# Patient Record
Sex: FEMALE | Race: WHITE | NOT HISPANIC OR LATINO | Employment: OTHER
[De-identification: names, ages, dates, MRNs, and addresses within clinical notes are randomized per-mention and may not be internally consistent; named-entity substitution may affect disease eponyms.]

---

## 2017-09-24 ENCOUNTER — HEALTH MAINTENANCE LETTER (OUTPATIENT)
Age: 62
End: 2017-09-24

## 2017-12-26 ENCOUNTER — OFFICE VISIT (OUTPATIENT)
Dept: FAMILY MEDICINE | Facility: CLINIC | Age: 62
End: 2017-12-26
Payer: COMMERCIAL

## 2017-12-26 VITALS
TEMPERATURE: 97.6 F | WEIGHT: 185.2 LBS | DIASTOLIC BLOOD PRESSURE: 84 MMHG | OXYGEN SATURATION: 97 % | HEIGHT: 68 IN | HEART RATE: 72 BPM | SYSTOLIC BLOOD PRESSURE: 142 MMHG | BODY MASS INDEX: 28.07 KG/M2

## 2017-12-26 DIAGNOSIS — H65.01 RIGHT ACUTE SEROUS OTITIS MEDIA, RECURRENCE NOT SPECIFIED: Primary | ICD-10-CM

## 2017-12-26 DIAGNOSIS — H60.92 OTITIS EXTERNA OF LEFT EAR, UNSPECIFIED CHRONICITY, UNSPECIFIED TYPE: ICD-10-CM

## 2017-12-26 PROCEDURE — 99213 OFFICE O/P EST LOW 20 MIN: CPT | Performed by: PHYSICIAN ASSISTANT

## 2017-12-26 RX ORDER — CIPROFLOXACIN 500 MG/1
500 TABLET, FILM COATED ORAL 2 TIMES DAILY
COMMUNITY
End: 2019-07-30

## 2017-12-26 RX ORDER — FLUTICASONE PROPIONATE 50 MCG
2 SPRAY, SUSPENSION (ML) NASAL DAILY
COMMUNITY
End: 2019-07-30

## 2017-12-26 RX ORDER — CIPROFLOXACIN AND DEXAMETHASONE 3; 1 MG/ML; MG/ML
3 SUSPENSION/ DROPS AURICULAR (OTIC) 2 TIMES DAILY
COMMUNITY
End: 2019-07-30

## 2017-12-26 RX ORDER — CEFDINIR 300 MG/1
300 CAPSULE ORAL 2 TIMES DAILY
Qty: 20 CAPSULE | Refills: 0 | Status: SHIPPED | OUTPATIENT
Start: 2017-12-26 | End: 2018-01-05

## 2017-12-26 ASSESSMENT — ENCOUNTER SYMPTOMS
NEUROLOGICAL NEGATIVE: 1
WEIGHT LOSS: 0
COUGH: 1
HEADACHES: 0
EYE PAIN: 0
CARDIOVASCULAR NEGATIVE: 1
DIAPHORESIS: 0
HEMOPTYSIS: 0
DIZZINESS: 0
PALPITATIONS: 0
FEVER: 0
CONSTITUTIONAL NEGATIVE: 1
GASTROINTESTINAL NEGATIVE: 1

## 2017-12-26 NOTE — NURSING NOTE
"Chief Complaint   Patient presents with     Ear Problem     Right Ear       Initial /84 (BP Location: Right arm, Patient Position: Sitting, Cuff Size: Adult Large)  Pulse 72  Temp 97.6  F (36.4  C) (Oral)  Ht 5' 7.5\" (1.715 m)  Wt 185 lb 3.2 oz (84 kg)  SpO2 97%  BMI 28.58 kg/m2 Estimated body mass index is 28.58 kg/(m^2) as calculated from the following:    Height as of this encounter: 5' 7.5\" (1.715 m).    Weight as of this encounter: 185 lb 3.2 oz (84 kg).  Medication Reconciliation: complete    "

## 2017-12-26 NOTE — PROGRESS NOTES
SUBJECTIVE:     HPI  Neema Nixon is a 62 year old female who presents to clinic today for the following health issues:  ENT Symptoms           Symptoms: cc Present Absent Comment   Fever/Chills   x    Fatigue  x     Muscle Aches   x    Eye Irritation   x    Sneezing  x     Nasal Mc/Drg  x     Sinus Pressure/Pain  x  A little   Loss of smell  x     Dental pain   x    Sore Throat   x    Swollen Glands   x    Ear Pain/Fullness x   R ear pain with plugged sensation.  No drainage.  Recent swimming.  Was on an airplane recently.   Cough  x  Mild, nonproductive   Wheeze   x    Chest Pain   x    Shortness of breath   x    Rash   x    Other         Symptom duration:  Yesterday--Right Ear.  Is currently on ciprofloxacin tablets and ear drops along with medrol dose pack for a L sided ear infection which had initially failed  amoxicillin.     Symptom severity:  moderate to severe   Treatments tried:  ear drops, flonase, medrol will   Contacts:         Reviewed PMH    Current Outpatient Prescriptions   Medication Sig Dispense Refill     ciprofloxacin (CIPRO) 500 MG tablet Take 500 mg by mouth 2 times daily       fluticasone (FLONASE) 50 MCG/ACT spray Spray 2 sprays into both nostrils daily       ciprofloxacin-dexamethasone (CIPRODEX) otic suspension Place 3 drops Into the left ear 2 times daily       Allergies   Allergen Reactions     Sulfa Drugs        Review of Systems   Constitutional: Negative.  Negative for diaphoresis, fever and weight loss.   HENT: Positive for ear pain and hearing loss. Negative for tinnitus.    Eyes: Negative for pain.   Respiratory: Positive for cough. Negative for hemoptysis.    Cardiovascular: Negative.  Negative for chest pain and palpitations.   Gastrointestinal: Negative.    Skin: Negative.    Neurological: Negative.  Negative for dizziness and headaches.   Endo/Heme/Allergies: Negative for environmental allergies.   All other systems reviewed and are negative.      /84 (BP  "Location: Right arm, Patient Position: Sitting, Cuff Size: Adult Large)  Pulse 72  Temp 97.6  F (36.4  C) (Oral)  Ht 5' 7.5\" (1.715 m)  Wt 185 lb 3.2 oz (84 kg)  SpO2 97%  BMI 28.58 kg/m2  Physical Exam   Constitutional: She is oriented to person, place, and time and well-developed, well-nourished, and in no distress. No distress.   HENT:   Head: Normocephalic and atraumatic.   Right Ear: Hearing, external ear and ear canal normal. Tympanic membrane is erythematous and bulging. Tympanic membrane is not perforated and not retracted.   Left Ear: Hearing, tympanic membrane, external ear and ear canal normal.   Nose: Nose normal.   Mouth/Throat: Uvula is midline and oropharynx is clear and moist. No oropharyngeal exudate or posterior oropharyngeal erythema.   Eyes: Conjunctivae and EOM are normal. Pupils are equal, round, and reactive to light. No scleral icterus.   Neck: Normal range of motion. Neck supple. No thyromegaly present.   Cardiovascular: Normal rate, regular rhythm, normal heart sounds and intact distal pulses.  Exam reveals no gallop and no friction rub.    No murmur heard.  Pulmonary/Chest: Effort normal and breath sounds normal. No accessory muscle usage. No respiratory distress. She has no decreased breath sounds. She has no wheezes. She has no rhonchi. She has no rales.   Lymphadenopathy:     She has no cervical adenopathy.   Neurological: She is alert and oriented to person, place, and time.   Skin: Skin is warm and dry. No cyanosis. Nails show no clubbing.   Psychiatric: Mood and affect normal.   Nursing note and vitals reviewed.        Assessment/Plan:  Right acute serous otitis media, recurrence not specified:  Has failed amoxicillin and cipro.  Will treat with [id number] for OM.  Discussed risks and benefits of medication along with side effects, direction for use, and discoloration of the urine and stools.  Recommend tylenol/ibuprofen prn pain/fever and zyrtec/claritin for sinus " congestion.   Rest, fluids, chicken soup.  Will send to ENT if no improvement.  Recheck in clinic if symptoms worsen or if symptoms do not improve.    -     cefdinir (OMNICEF) 300 MG capsule; Take 1 capsule (300 mg) by mouth 2 times daily for 10 days  -     OTOLARYNGOLOGY REFERRAL    Otitis externa of left ear, unspecified chronicity, unspecified type:  This has improved on cipro tablets, ear drops and medrol dose pack.  Complete the medications as directed.  Keep clean and dry.  Avoid foreign body insertion.  -     OTOLARYNGOLOGY REFERRAL      Tamara Pavon PA-C

## 2017-12-26 NOTE — MR AVS SNAPSHOT
After Visit Summary   12/26/2017    Neema Nixon    MRN: [id number]           Patient Information     Date Of Birth          1955        Visit Information        Provider Department      12/26/2017 8:00 AM Tamara Pavon PA-C [facility]        Today's Diagnoses     Right acute serous otitis media, recurrence not specified    -  1    Otitis externa of left ear, unspecified chronicity, unspecified type           Follow-ups after your visit        Additional Services     OTOLARYNGOLOGY REFERRAL       Your provider has referred you to: FMG: [facility] [place] [phone]   [email or web address]  FMG: [place] [facility] [place] [phone]   [email or web address]  UM: [facility] [place] [phone]   [email or web address]    Please be aware that coverage of these services is subject to the terms and limitations of your health insurance plan.  Call member services at your health plan with any benefit or coverage questions.      Please bring the following with you to your appointment:    (1) Any X-Rays, CTs or MRIs which have been performed.  Contact the facility where they were done to arrange for  prior to your scheduled appointment.   (2) List of current medications  (3) This referral request   (4) Any documents/labs given to you for this referral                  Who to contact     If you have questions or need follow up information about today's clinic visit or your schedule please contact [facility] directly at [phone].  Normal or non-critical lab and imaging results will be communicated to you by MyChart, letter or phone within 4 business days after the clinic has received the results. If you do not hear from us within 7 days, please contact the clinic through MyChart or phone. If you have  "a critical or abnormal lab result, we will notify you by phone as soon as possible.  Submit refill requests through "[employer] or call your pharmacy and they will forward the refill request to us. Please allow 3 business days for your refill to be completed.          Additional Information About Your Visit        Coin[employer] Information     "[employer] lets you send messages to your doctor, view your test results, renew your prescriptions, schedule appointments and more. To sign up, go to [email or web address] . Click on \"Log in\" on the left side of the screen, which will take you to the Welcome page. Then click on \"Sign up Now\" on the right side of the page.     You will be asked to enter the access code listed below, as well as some personal information. Please follow the directions to create your username and password.     Your access code is: [email or web address]  Expires: 3/26/2018  8:26 AM     Your access code will  in 90 days. If you need help or a new code, please call your [place] clinic or [phone].        Care EveryWhere ID     This is your Care EveryWhere ID. This could be used by other organizations to access your [place] medical records  [id number]        Your Vitals Were     Pulse Temperature Height Pulse Oximetry BMI (Body Mass Index)       72 97.6  F (36.4  C) (Oral) 5' 7.5\" (1.715 m) 97% 28.58 kg/m2        Blood Pressure from Last 3 Encounters:   17 142/84    Weight from Last 3 Encounters:   17 185 lb 3.2 oz (84 kg)              We Performed the Following     OTOLARYNGOLOGY REFERRAL          Today's Medication Changes          These changes are accurate as of: 17  8:26 AM.  If you have any questions, ask your nurse or doctor.               Start taking these medicines.        Dose/Directions    cefdinir 300 MG capsule   Commonly known as:  OMNICEF   Used for:  Right acute serous otitis media, recurrence not specified   Started by:  Tamara Pavon PA-C        Dose:  300 mg   Take 1 " capsule (300 mg) by mouth 2 times daily for 10 days   Quantity:  20 capsule   Refills:  0            Where to get your medicines      These medications were sent to [facility] [address] [place] [facility] [place] - [address]  [address], [place] [address]     Phone:  [phone]     cefdinir 300 MG capsule                Primary Care Provider Office Phone # Fax #    Maki Christiane Daigle [phone] [address] [address] [facility] [address]        Equal Access to Services     [facility]: Hadii aad ku hadasho Soomaali, waaxda luqadaha, qaybta kaalmada adeegyada, waxay idiin hayaan adeeg kharachente keyes . So [facility] [phone].    ATENCIÓN: Si habla español, tiene a vance disposición servicios gratuitos de asistencia lingüística. [facility] [phone].    We comply with applicable federal civil rights laws and [place] laws. We do not discriminate on the basis of race, color, national origin, age, disability, sex, sexual orientation, or gender identity.            Thank you!     Thank you for choosing [facility]  for your care. Our goal is always to provide you with excellent care. Hearing back from our patients is one way we can continue to improve our services. Please take a few minutes to complete the written survey that you may receive in the mail after your visit with us. Thank you!             Your Updated Medication List - Protect others around you: Learn how to safely use, store and throw away your medicines at www.disposemymeds.org.          This list is accurate as of: 12/26/17  8:26 AM.  Always use your most recent med list.                   Brand Name Dispense Instructions for use Diagnosis    cefdinir 300 MG capsule    OMNICEF    20 capsule    Take 1 capsule (300 mg) by mouth 2 times daily for 10 days    Right acute serous otitis media, recurrence not specified       CIPRO 500 MG tablet   Generic drug:  ciprofloxacin      Take 500 mg by mouth 2 times daily         ciprofloxacin-dexamethasone otic suspension    CIPRODEX     Place 3 drops Into the left ear 2 times daily        fluticasone 50 MCG/ACT spray    FLONASE     Spray 2 sprays into both nostrils daily

## 2019-07-01 ENCOUNTER — MEDICAL CORRESPONDENCE (OUTPATIENT)
Dept: HEALTH INFORMATION MANAGEMENT | Facility: CLINIC | Age: 64
End: 2019-07-01

## 2019-07-30 ENCOUNTER — OFFICE VISIT (OUTPATIENT)
Dept: OPHTHALMOLOGY | Facility: CLINIC | Age: 64
End: 2019-07-30
Attending: OPHTHALMOLOGY
Payer: COMMERCIAL

## 2019-07-30 DIAGNOSIS — H53.10 SUBJECTIVE VISUAL DISTURBANCE: ICD-10-CM

## 2019-07-30 DIAGNOSIS — H50.32 INTERMITTENT ESOTROPIA, ALTERNATING: Primary | ICD-10-CM

## 2019-07-30 DIAGNOSIS — H53.10 SUBJECTIVE VISUAL DISTURBANCE: Primary | ICD-10-CM

## 2019-07-30 PROCEDURE — G0463 HOSPITAL OUTPT CLINIC VISIT: HCPCS | Mod: ZF

## 2019-07-30 PROCEDURE — 92060 SENSORIMOTOR EXAMINATION: CPT | Mod: ZF | Performed by: OPHTHALMOLOGY

## 2019-07-30 RX ORDER — ERGOCALCIFEROL (VITAMIN D2) 10 MCG
1 TABLET ORAL EVERY MORNING
COMMUNITY
Start: 2017-02-15

## 2019-07-30 ASSESSMENT — VISUAL ACUITY
OS_CC: 20/25
METHOD: SNELLEN - LINEAR
CORRECTION_TYPE: GLASSES
OD_CC: 20/25

## 2019-07-30 ASSESSMENT — REFRACTION_WEARINGRX
OD_CYLINDER: SPHERE
OS_SPHERE: -12.25
OS_AXIS: 038
OS_CYLINDER: +1.25
OD_SPHERE: -11.00

## 2019-07-30 ASSESSMENT — SLIT LAMP EXAM - LIDS
COMMENTS: MGD
COMMENTS: MGD

## 2019-07-30 ASSESSMENT — EXTERNAL EXAM - RIGHT EYE: OD_EXAM: PTOSIS

## 2019-07-30 ASSESSMENT — TONOMETRY
OD_IOP_MMHG: 11
OS_IOP_MMHG: 12
IOP_METHOD: ICARE

## 2019-07-30 ASSESSMENT — CONF VISUAL FIELD
OD_NORMAL: 1
OS_NORMAL: 1

## 2019-07-30 ASSESSMENT — CUP TO DISC RATIO
OD_RATIO: 0.35
OS_RATIO: 0.45

## 2019-07-30 ASSESSMENT — EXTERNAL EXAM - LEFT EYE: OS_EXAM: PTOSIS

## 2019-07-30 NOTE — PROGRESS NOTES
Assessment & Plan     Neema Nixon is a 64 year old female with the following diagnoses:   1. Intermittent esotropia, alternating    2. Subjective visual disturbance         Patient was sent for consultation at the request of Dr. Neumann for binocular diplopia. Patient notes that for the last two years she has noticed binocular diplopia. It is intermittent but there most of the time. It is worse at distance, better at near. There is a vertical and horizontal component. She feels that if she puts pressure on the lateral aspect of the left globe, she can correct the double. No headaches. No weakness, numbness, tingling. She otherwise feels well. No jaw claudication, scalp tenderness, fatigue, weight loss, or proximal muscle weakness. She has not tried a fresnel.     On examination today, visual acuity is 20/25 in the right eye and 20/25 in the left eye. Pupillary exam is normal without evidence of afferent pupillary defect. Intraocular pressure is normal in both eyes. Confrontational visual fields are normal in both eyes. Ishihara color plates are full in both eyes. Anterior segment exam is unremarkable. Optic nerves appear healthy. Dilated fundoscopic exam is remarkable for mild ERM right eye, otherwise unremarkable. She has Posterior vitreous detachment (PVD) both eyes.  Extraocular motility showed a subtle AB duction deficit in both eyes.  She has an esotropia that is fairly competent in the distance and better at near.  She appears to appreciate a 15 base out prism.    It is my impression that the patient has intermittent esotropia.  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 It is also possible this is due to sagging eye syndrome or posterior fossa lesion.  It could also be a breakdown of her pre-existing misalignment.  I will obtain MRI brain.  I will give her a 15 STELLA prism left eye in the meantime.  If MRI normal can consider surgical evaluation with  strabismus surgeon.  Could consider lid evaluation with oculoplastics if ptosis continues to be bothersome after strabismus surgery.  I do not believe this is consistent with myasthenia gravis.         Attending Physician Attestation:  Complete documentation of historical and exam elements from today's encounter can be found in the full encounter summary report (not reduplicated in this progress note).  I personally obtained the chief complaint(s) and history of present illness.  I confirmed and edited as necessary the review of systems, past medical/surgical history, family history, social history, and examination findings as documented by others; and I examined the patient myself.  I personally reviewed the relevant tests, images, and reports as documented above.  I formulated and edited as necessary the assessment and plan and discussed the findings and management plan with the patient and family. - Rodri Cruz MD  Ophthalmology Resident, PGY-3

## 2019-07-30 NOTE — LETTER
2019         RE:  :  MRN: Neema Nixon  1955  [id number]     Dear Dr. Neumann,    Thank you for asking me to see your very pleasant patient, Neema Nixon, in neuro-ophthalmic consultation.  I would like to thank you for sending your records and I have summarized them in the history of present illness.  My assessment and plan are below.  For further details, please see my attached clinic note.       Assessment & Plan     Neema Nixon is a 64 year old female with the following diagnoses:   1. Intermittent esotropia, alternating    2. Subjective visual disturbance         Patient was sent for consultation at the request of Dr. Neumann for binocular diplopia. Patient notes that for the last two years she has noticed binocular diplopia. It is intermittent but there most of the time. It is worse at distance, better at near. There is a vertical and horizontal component. She feels that if she puts pressure on the lateral aspect of the left globe, she can correct the double. No headaches. No weakness, numbness, tingling. She otherwise feels well. No jaw claudication, scalp tenderness, fatigue, weight loss, or proximal muscle weakness. She has not tried a fresnel.     On examination today, visual acuity is 20/25 in the right eye and 20/25 in the left eye. Pupillary exam is normal without evidence of afferent pupillary defect. Intraocular pressure is normal in both eyes. Confrontational visual fields are normal in both eyes. Ishihara color plates are full in both eyes. Anterior segment exam is unremarkable. Optic nerves appear healthy. Dilated fundoscopic exam is remarkable for mild ERM right eye, otherwise unremarkable. She has Posterior vitreous detachment (PVD) both eyes.  Extraocular motility showed a subtle AB duction deficit in both eyes.  She has an esotropia that is fairly competent in the distance and better at near.  She appears to appreciate a 15 base out prism.    It is my impression that the  patient has intermittent esotropia.  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 It is also possible this is due to sagging eye syndrome or posterior fossa lesion.  It could also be a breakdown of her pre-existing misalignment.  I will obtain MRI brain.  I will give her a 15 STELLA prism left eye in the meantime.  If MRI normal can consider surgical evaluation with strabismus surgeon.  Could consider lid evaluation with oculoplastics if ptosis continues to be bothersome after strabismus surgery.  I do not believe this is consistent with myasthenia gravis.  Again, thank you for allowing me to participate in the care of your patient.      Sincerely,    Rodri Souza MD  Professor  Ophthalmology Residency   Director of Neuro-Ophthalmology  [facility]  [facility] Chair  Departments of Ophthalmology, Neurology, and Neurosurgery  [facility]   [address] [place] [address]  [address]  T - [phone]  F - [phone]  [email or web address]      CC: Deanna Neumann MD  [facility]  [address] [facility] [address]  VIA Facsimile: [phone]     Maki Daigle MD  [address] [facility] [address]  VIA In Basket       DX = heavy eye syndrome?     Addendum: The [facility] is now enrolling patients in the Surgical IIHTT which randomizes patients with Idiopathic Intracranial Hypertension and moderate to severe vision loss to one of the following regimens:    1.  Acetazolamide + diet   2.  Ventriculoperitoneal shunt     3.  Optic nerve sheath fenestration     We would appreciate referral of any newly diagnosed case of bilateral papilledema, ideally before any laboratory evaluation.  Please contact our  at [email or web address] or [phone].  Thank you!

## 2019-07-30 NOTE — NURSING NOTE
Chief Complaints and History of Present Illnesses   Patient presents with     Diplopia Evaluation     Chief Complaint(s) and History of Present Illness(es)     Diplopia Evaluation               Comments     Neema Nixon is a 64 year old female who presents today for  Diplopia    Gradual onset two years ago. Intermittent. Worse at distance. Patient has a history of high  Myopia with astigmatism.     Cristiano RODAS 7:47 AM July 30, 2019

## 2019-08-06 ENCOUNTER — ANCILLARY PROCEDURE (OUTPATIENT)
Dept: MRI IMAGING | Facility: CLINIC | Age: 64
End: 2019-08-06
Attending: OPHTHALMOLOGY
Payer: COMMERCIAL

## 2019-08-06 DIAGNOSIS — H50.32 INTERMITTENT ESOTROPIA, ALTERNATING: ICD-10-CM

## 2019-08-06 DIAGNOSIS — H53.10 SUBJECTIVE VISUAL DISTURBANCE: ICD-10-CM

## 2019-08-06 PROCEDURE — 70553 MRI BRAIN STEM W/O & W/DYE: CPT | Mod: TC

## 2019-08-06 PROCEDURE — A9585 GADOBUTROL INJECTION: HCPCS | Mod: JW

## 2019-08-06 RX ORDER — GADOBUTROL 604.72 MG/ML
10 INJECTION INTRAVENOUS ONCE
Status: COMPLETED | OUTPATIENT
Start: 2019-08-06 | End: 2019-08-06

## 2019-08-06 RX ADMIN — GADOBUTROL 8.5 ML: 604.72 INJECTION INTRAVENOUS at 11:33

## 2019-08-07 ENCOUNTER — TELEPHONE (OUTPATIENT)
Dept: OPHTHALMOLOGY | Facility: CLINIC | Age: 64
End: 2019-08-07

## 2019-08-07 DIAGNOSIS — D32.9 MENINGIOMA (H): Primary | ICD-10-CM

## 2019-08-07 NOTE — TELEPHONE ENCOUNTER
Pt calling clinic back after receiving message about MRI results    Note to Dr. Cheatham/facilitator for assist with call back for results  Keegan Goldberg RN 10:45 AM 08/07/19

## 2019-08-07 NOTE — TELEPHONE ENCOUNTER
Patient had recent MRI to rule out posterior fossa etiology of diplopia. 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 I discussed with the patient that this is not the etiology of her diplopia. She is interested in pursuing evaluation with Dr. Hernández, but would like to see neurosurgery first prior to seeing Dr. Hernández.     Rodri Cheatham MD  Ophthalmology, PGY-5  Neuro-Ophthalmology Fellow

## 2019-08-08 ENCOUNTER — TELEPHONE (OUTPATIENT)
Dept: OPHTHALMOLOGY | Facility: CLINIC | Age: 64
End: 2019-08-08

## 2019-08-08 NOTE — TELEPHONE ENCOUNTER
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 Had a recent MRI with incidental finding of right anterior clinioid process meningioma. Patient is going to see neurosurgery and then would like to see Dr. Hernández. Could you please have patient schedule with Dr. Hernández in 3-4 months? Thanks.    YE

## 2019-08-08 NOTE — TELEPHONE ENCOUNTER
"Scheduled patient for follow up, patient is \"snow bird\" and will be in [place] through october then she will be leaving and not returning until around Xmas. Scheduled for January for f/u  Patient requested reminder letter be mailed to [place] address (saved as temporary)  Patient inquired about neuro surgery f/u - sent message to pool to let them know about temp address/referral   "

## 2019-08-21 ENCOUNTER — DOCUMENTATION ONLY (OUTPATIENT)
Dept: CARE COORDINATION | Facility: CLINIC | Age: 64
End: 2019-08-21

## 2019-09-06 ENCOUNTER — OFFICE VISIT (OUTPATIENT)
Dept: NEUROSURGERY | Facility: CLINIC | Age: 64
End: 2019-09-06
Attending: NEUROLOGICAL SURGERY
Payer: COMMERCIAL

## 2019-09-06 VITALS
BODY MASS INDEX: 29.26 KG/M2 | SYSTOLIC BLOOD PRESSURE: 138 MMHG | RESPIRATION RATE: 16 BRPM | DIASTOLIC BLOOD PRESSURE: 80 MMHG | WEIGHT: 189.6 LBS | OXYGEN SATURATION: 98 % | HEART RATE: 71 BPM

## 2019-09-06 DIAGNOSIS — D32.9 MENINGIOMA (H): ICD-10-CM

## 2019-09-06 PROCEDURE — G0463 HOSPITAL OUTPT CLINIC VISIT: HCPCS

## 2019-09-06 ASSESSMENT — PAIN SCALES - GENERAL: PAINLEVEL: NO PAIN (0)

## 2019-09-06 NOTE — NURSING NOTE
"Oncology Rooming Note    September 6, 2019 2:34 PM   Neema Nixon is a 64 year old female who presents for:    Chief Complaint   Patient presents with     Oncology Clinic Visit     Pt is here for a New Eval for Meninginoma     Initial Vitals: Blood Pressure 138/80   Pulse 71   Respiration 16   Weight 86 kg (189 lb 9.6 oz)   Oxygen Saturation 98%   Body Mass Index 29.26 kg/m   Estimated body mass index is 29.26 kg/m  as calculated from the following:    Height as of 12/26/17: 1.715 m (5' 7.5\").    Weight as of this encounter: 86 kg (189 lb 9.6 oz). Body surface area is 2.02 meters squared.  No Pain (0) Comment: Data Unavailable   No LMP recorded. Patient is postmenopausal.  Allergies reviewed: Yes  Medications reviewed: Yes    Medications: Medication refills not needed today.  Pharmacy name entered into [facility]: [id number] [place] [address]    Clinical concerns: none       Stefanie Colmenares MA              "

## 2019-09-06 NOTE — LETTER
9/6/2019       RE: Neema Nixon  [address] [address] [facility] [address] [address]  [place] [address]     Dear Colleague,    Thank you for referring your patient, Neema Nixon, to the [facility]. Please see a copy of my visit note below.    Neurosurgery Clinic Note    Initial evaluation for an incidentally discovered right paraclinoid meningioma    64 F who underwent MRI of the brain as work-up for changes in vision. MRI revealed an1.1 x 1.3 x 1.4 cm right paraclinoid meningioma, without contact with the optic apparatus. There is no significant FLAIR surrounding the paraclinoid lesion. The patient presents for evaluation.    On review of systems, the patient report improvement in her visual symptoms after corrective lens use. Denied nausea, vomiting, headache, seizure like activities, altered motor/sensory function.    No PMH.    Current Outpatient Medications:      Vitamin D, Cholecalciferol, 400 units TABS, , Disp: , Rfl:     Allergies   Allergen Reactions     Sulfa Drugs      /80   Pulse 71   Resp 16   Wt 86 kg (189 lb 9.6 oz)   SpO2 98%   BMI 29.26 kg/m       Examination non-focal.    AP: 64 F with an incidentally found right paraclinoid meningioma, without attributable symptoms. In this context, I will schedule a surveillance MRI in 3 months. The patient will be in [place] in that time frame will be imaged locally. She will send in her MRI for assessment.    I have spent 65 minutes today, with the majority of the visit counseling the patient on the diagnosis. All questions were answered. Over 50% of my time on the unit was spent counseling the patient in terms expectations in terms of surveillance imaging.    Again, thank you for allowing me to participate in the care of your patient.      Sincerely,    Blake Raza MD

## 2019-09-12 NOTE — PROGRESS NOTES
Neurosurgery Clinic Note    Initial evaluation for an incidentally discovered right paraclinoid meningioma    64 F who underwent MRI of the brain as work-up for changes in vision. MRI revealed an1.1 x 1.3 x 1.4 cm right paraclinoid meningioma, without contact with the optic apparatus. There is no significant FLAIR surrounding the paraclinoid lesion. The patient presents for evaluation.    On review of systems, the patient report improvement in her visual symptoms after corrective lens use. Denied nausea, vomiting, headache, seizure like activities, altered motor/sensory function.    No PMH.    Current Outpatient Medications:      Vitamin D, Cholecalciferol, 400 units TABS, , Disp: , Rfl:     Allergies   Allergen Reactions     Sulfa Drugs      /80   Pulse 71   Resp 16   Wt 86 kg (189 lb 9.6 oz)   SpO2 98%   BMI 29.26 kg/m      Examination non-focal.    AP: 64 F with an incidentally found right paraclinoid meningioma, without attributable symptoms. In this context, I will schedule a surveillance MRI in 3 months. The patient will be in [place] in that time frame will be imaged locally. She will send in her MRI for assessment.    I have spent 65 minutes today, with the majority of the visit counseling the patient on the diagnosis. All questions were answered. Over 50% of my time on the unit was spent counseling the patient in terms expectations in terms of surveillance imaging.

## 2019-10-10 ENCOUNTER — OFFICE VISIT (OUTPATIENT)
Dept: OPHTHALMOLOGY | Facility: CLINIC | Age: 64
End: 2019-10-10
Attending: OPHTHALMOLOGY
Payer: COMMERCIAL

## 2019-10-10 DIAGNOSIS — H50.05 ESOTROPIA, ALTERNATING: ICD-10-CM

## 2019-10-10 DIAGNOSIS — H53.10 SUBJECTIVE VISUAL DISTURBANCE: ICD-10-CM

## 2019-10-10 DIAGNOSIS — H53.2 DOUBLE VISION: Primary | ICD-10-CM

## 2019-10-10 PROCEDURE — 92060 SENSORIMOTOR EXAMINATION: CPT | Mod: ZF | Performed by: OPHTHALMOLOGY

## 2019-10-10 PROCEDURE — G0463 HOSPITAL OUTPT CLINIC VISIT: HCPCS | Mod: 25,ZF

## 2019-10-10 ASSESSMENT — REFRACTION_WEARINGRX
OD_SPHERE: -11.00
OS_AXIS: 038
OS_SPHERE: -12.25
OD_CYLINDER: SPHERE
OS_CYLINDER: +1.25

## 2019-10-10 ASSESSMENT — VISUAL ACUITY
OD_CC: 20/25
CORRECTION_TYPE: GLASSES
METHOD: SNELLEN - LINEAR
OS_CC: 20/30
OS_CC+: -2

## 2019-10-10 ASSESSMENT — SLIT LAMP EXAM - LIDS
COMMENTS: MGD
COMMENTS: MGD

## 2019-10-10 ASSESSMENT — EXTERNAL EXAM - LEFT EYE: OS_EXAM: PTOSIS

## 2019-10-10 ASSESSMENT — CONF VISUAL FIELD
OS_NORMAL: 1
OD_NORMAL: 1

## 2019-10-10 ASSESSMENT — TONOMETRY
IOP_METHOD: ICARE
OS_IOP_MMHG: 7
OD_IOP_MMHG: 8

## 2019-10-10 ASSESSMENT — EXTERNAL EXAM - RIGHT EYE: OD_EXAM: PTOSIS

## 2019-10-10 NOTE — NURSING NOTE
Chief Complaints and History of Present Illnesses   Patient presents with     Diplopia Follow-Up     Chief Complaint(s) and History of Present Illness(es)     Diplopia Follow-Up               Comments     Neema Nixon is a 64 year old female who presents today for    1. INTERMITTENT ALTERNATING ESOTROPIA  MRI showed incidental meningioma superior to the right anterior clinoid process.     Fit with fresnel since the last visit. Interferes with vision.     Cristiano RODAS 2:30 PM October 10, 2019

## 2019-10-10 NOTE — PROGRESS NOTES
1. 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  Patient highly interested in strabismus surgery.  Discussed the risks, benefits, and alternatives of strabismus surgery and patient wishes to proceed.    Patient has had on and off again diplopia since June.    Prior to that she would occasionally have diplopia.     Status post MRI showing dural based mass.  Plan to follow with serial neuro-imaging.  She consulted with Dr. Raza in neurosurgery who wishes to observe.    Family history: First degree cousin- highly myopic with strabismus and requried strabismus surgery.    MRI 8/6/19- MRI brain with and without IV contrast (radiology read)  Ovoid uniformly enhancing 1.4 cm extra-axial dural-based  mass positioned along the superior aspect of the right anterior  clinoid process, most likely representing a meningioma.    Sensorimotor exam showed a relatively comitant 16-25 prism diopter esotropia with full motility. Patient was able to fuse 16-27 prism diopters base out prism.    We discussed in detail the risks, benefits, and alternatives of eye muscle correction surgery including the very rare risk of death or serious morbidity from a general anesthesia complication and the rare risk of severe vision loss in the operative eye(s) secondary to retinal detachment or endophthalmitis.  We discussed more likely sub-optimal outcomes including the unanticipated need for additional strabismus surgery.  Finally the patient was aware that prisms glasses may be required to optimize single vision following surgery.    After a thorough discussion of these risks, the patient decided to proceed with strabismus surgery.  The surgical plan is as follows:      1. Bilateral medial rectus recession with use of adjustable suture.    Follow-up 1 week after strabismus surgery.    Plan to operate at: ASC  Prism free glasses for adjustment: patient already has    Patient leaving this week  to go [facility]- may return in Feb or March for 1 week. Interested in strabismus surgery at that time.          Complete documentation of historical and exam elements from today's encounter can be found in the full encounter summary report (not reduplicated in this progress note).  I personally obtained the chief complaint(s) and history of present illness.  I confirmed and edited as necessary the review of systems, past medical/surgical history, family history, social history, and examination findings as documented by others; and I examined the patient myself.  I personally reviewed the relevant tests, images, and reports as documented above.  I formulated and edited as necessary the assessment and plan and discussed the findings and management plan with the patient and family.  I personally reviewed the ophthalmic test(s) associated with this encounter, agree with the interpretation(s) as documented by the resident/fellow, and have edited the corresponding report(s) as necessary.     Mikhail Hernández MD

## 2019-10-23 ENCOUNTER — PREP FOR PROCEDURE (OUTPATIENT)
Dept: OPHTHALMOLOGY | Facility: CLINIC | Age: 64
End: 2019-10-23

## 2019-10-23 DIAGNOSIS — H53.2 DOUBLE VISION: Primary | ICD-10-CM

## 2019-11-05 ENCOUNTER — TRANSFERRED RECORDS (OUTPATIENT)
Dept: HEALTH INFORMATION MANAGEMENT | Facility: CLINIC | Age: 64
End: 2019-11-05

## 2019-11-09 ENCOUNTER — HEALTH MAINTENANCE LETTER (OUTPATIENT)
Age: 64
End: 2019-11-09

## 2020-01-31 ENCOUNTER — ANESTHESIA EVENT (OUTPATIENT)
Dept: SURGERY | Facility: AMBULATORY SURGERY CENTER | Age: 65
End: 2020-01-31

## 2020-02-03 ENCOUNTER — HOSPITAL ENCOUNTER (OUTPATIENT)
Facility: AMBULATORY SURGERY CENTER | Age: 65
End: 2020-02-03
Attending: OPHTHALMOLOGY
Payer: COMMERCIAL

## 2020-02-03 ENCOUNTER — ANESTHESIA (OUTPATIENT)
Dept: SURGERY | Facility: AMBULATORY SURGERY CENTER | Age: 65
End: 2020-02-03

## 2020-02-03 VITALS
BODY MASS INDEX: 28.04 KG/M2 | TEMPERATURE: 98 F | RESPIRATION RATE: 16 BRPM | SYSTOLIC BLOOD PRESSURE: 130 MMHG | HEIGHT: 68 IN | OXYGEN SATURATION: 99 % | DIASTOLIC BLOOD PRESSURE: 71 MMHG | HEART RATE: 67 BPM | WEIGHT: 185 LBS

## 2020-02-03 DIAGNOSIS — H53.2 DOUBLE VISION: ICD-10-CM

## 2020-02-03 DIAGNOSIS — Z98.890 STATUS POST EYE SURGERY: Primary | ICD-10-CM

## 2020-02-03 RX ORDER — MEPERIDINE HYDROCHLORIDE 25 MG/ML
12.5 INJECTION INTRAMUSCULAR; INTRAVENOUS; SUBCUTANEOUS
Status: DISCONTINUED | OUTPATIENT
Start: 2020-02-03 | End: 2020-02-04 | Stop reason: HOSPADM

## 2020-02-03 RX ORDER — PROPOFOL 10 MG/ML
INJECTION, EMULSION INTRAVENOUS CONTINUOUS PRN
Status: DISCONTINUED | OUTPATIENT
Start: 2020-02-03 | End: 2020-02-03

## 2020-02-03 RX ORDER — FENTANYL CITRATE 50 UG/ML
INJECTION, SOLUTION INTRAMUSCULAR; INTRAVENOUS PRN
Status: DISCONTINUED | OUTPATIENT
Start: 2020-02-03 | End: 2020-02-03

## 2020-02-03 RX ORDER — KETOROLAC TROMETHAMINE 30 MG/ML
INJECTION, SOLUTION INTRAMUSCULAR; INTRAVENOUS PRN
Status: DISCONTINUED | OUTPATIENT
Start: 2020-02-03 | End: 2020-02-03

## 2020-02-03 RX ORDER — OXYMETAZOLINE HYDROCHLORIDE 0.05 G/100ML
SPRAY NASAL PRN
Status: DISCONTINUED | OUTPATIENT
Start: 2020-02-03 | End: 2020-02-03 | Stop reason: HOSPADM

## 2020-02-03 RX ORDER — PROPOFOL 10 MG/ML
INJECTION, EMULSION INTRAVENOUS PRN
Status: DISCONTINUED | OUTPATIENT
Start: 2020-02-03 | End: 2020-02-03

## 2020-02-03 RX ORDER — ACETAMINOPHEN 325 MG/1
975 TABLET ORAL ONCE
Status: COMPLETED | OUTPATIENT
Start: 2020-02-03 | End: 2020-02-03

## 2020-02-03 RX ORDER — NALOXONE HYDROCHLORIDE 0.4 MG/ML
.1-.4 INJECTION, SOLUTION INTRAMUSCULAR; INTRAVENOUS; SUBCUTANEOUS
Status: DISCONTINUED | OUTPATIENT
Start: 2020-02-03 | End: 2020-02-04 | Stop reason: HOSPADM

## 2020-02-03 RX ORDER — ONDANSETRON 2 MG/ML
4 INJECTION INTRAMUSCULAR; INTRAVENOUS EVERY 30 MIN PRN
Status: DISCONTINUED | OUTPATIENT
Start: 2020-02-03 | End: 2020-02-04 | Stop reason: HOSPADM

## 2020-02-03 RX ORDER — GLYCOPYRROLATE 0.2 MG/ML
INJECTION, SOLUTION INTRAMUSCULAR; INTRAVENOUS PRN
Status: DISCONTINUED | OUTPATIENT
Start: 2020-02-03 | End: 2020-02-03

## 2020-02-03 RX ORDER — BALANCED SALT SOLUTION 6.4; .75; .48; .3; 3.9; 1.7 MG/ML; MG/ML; MG/ML; MG/ML; MG/ML; MG/ML
SOLUTION OPHTHALMIC PRN
Status: DISCONTINUED | OUTPATIENT
Start: 2020-02-03 | End: 2020-02-03 | Stop reason: HOSPADM

## 2020-02-03 RX ORDER — SODIUM CHLORIDE, SODIUM LACTATE, POTASSIUM CHLORIDE, CALCIUM CHLORIDE 600; 310; 30; 20 MG/100ML; MG/100ML; MG/100ML; MG/100ML
INJECTION, SOLUTION INTRAVENOUS CONTINUOUS
Status: DISCONTINUED | OUTPATIENT
Start: 2020-02-03 | End: 2020-02-04 | Stop reason: HOSPADM

## 2020-02-03 RX ORDER — LIDOCAINE 40 MG/G
CREAM TOPICAL
Status: DISCONTINUED | OUTPATIENT
Start: 2020-02-03 | End: 2020-02-03 | Stop reason: HOSPADM

## 2020-02-03 RX ORDER — DEXAMETHASONE SODIUM PHOSPHATE 4 MG/ML
INJECTION, SOLUTION INTRA-ARTICULAR; INTRALESIONAL; INTRAMUSCULAR; INTRAVENOUS; SOFT TISSUE PRN
Status: DISCONTINUED | OUTPATIENT
Start: 2020-02-03 | End: 2020-02-03

## 2020-02-03 RX ORDER — OXYCODONE HYDROCHLORIDE 5 MG/1
5 TABLET ORAL EVERY 4 HOURS PRN
Status: DISCONTINUED | OUTPATIENT
Start: 2020-02-03 | End: 2020-02-04 | Stop reason: HOSPADM

## 2020-02-03 RX ORDER — LIDOCAINE HYDROCHLORIDE 20 MG/ML
INJECTION, SOLUTION INFILTRATION; PERINEURAL PRN
Status: DISCONTINUED | OUTPATIENT
Start: 2020-02-03 | End: 2020-02-03

## 2020-02-03 RX ORDER — FENTANYL CITRATE 50 UG/ML
25-50 INJECTION, SOLUTION INTRAMUSCULAR; INTRAVENOUS
Status: DISCONTINUED | OUTPATIENT
Start: 2020-02-03 | End: 2020-02-03 | Stop reason: HOSPADM

## 2020-02-03 RX ORDER — GABAPENTIN 300 MG/1
300 CAPSULE ORAL ONCE
Status: COMPLETED | OUTPATIENT
Start: 2020-02-03 | End: 2020-02-03

## 2020-02-03 RX ORDER — SODIUM CHLORIDE, SODIUM LACTATE, POTASSIUM CHLORIDE, CALCIUM CHLORIDE 600; 310; 30; 20 MG/100ML; MG/100ML; MG/100ML; MG/100ML
INJECTION, SOLUTION INTRAVENOUS CONTINUOUS
Status: DISCONTINUED | OUTPATIENT
Start: 2020-02-03 | End: 2020-02-03 | Stop reason: HOSPADM

## 2020-02-03 RX ORDER — ONDANSETRON 2 MG/ML
INJECTION INTRAMUSCULAR; INTRAVENOUS PRN
Status: DISCONTINUED | OUTPATIENT
Start: 2020-02-03 | End: 2020-02-03

## 2020-02-03 RX ORDER — ONDANSETRON 4 MG/1
4 TABLET, ORALLY DISINTEGRATING ORAL EVERY 30 MIN PRN
Status: DISCONTINUED | OUTPATIENT
Start: 2020-02-03 | End: 2020-02-04 | Stop reason: HOSPADM

## 2020-02-03 RX ORDER — PREDNISOLONE ACETATE 10 MG/ML
SUSPENSION/ DROPS OPHTHALMIC
Qty: 10 ML | Refills: 0 | Status: SHIPPED | OUTPATIENT
Start: 2020-02-03 | End: 2020-07-29

## 2020-02-03 RX ADMIN — PROPOFOL 200 MCG/KG/MIN: 10 INJECTION, EMULSION INTRAVENOUS at 07:24

## 2020-02-03 RX ADMIN — GLYCOPYRROLATE 0.2 MG: 0.2 INJECTION, SOLUTION INTRAMUSCULAR; INTRAVENOUS at 07:32

## 2020-02-03 RX ADMIN — Medication 100 MCG: at 07:34

## 2020-02-03 RX ADMIN — PROPOFOL 200 MG: 10 INJECTION, EMULSION INTRAVENOUS at 07:24

## 2020-02-03 RX ADMIN — KETOROLAC TROMETHAMINE 15 MG: 30 INJECTION, SOLUTION INTRAMUSCULAR; INTRAVENOUS at 07:30

## 2020-02-03 RX ADMIN — DEXAMETHASONE SODIUM PHOSPHATE 4 MG: 4 INJECTION, SOLUTION INTRA-ARTICULAR; INTRALESIONAL; INTRAMUSCULAR; INTRAVENOUS; SOFT TISSUE at 07:30

## 2020-02-03 RX ADMIN — LIDOCAINE HYDROCHLORIDE 100 MG: 20 INJECTION, SOLUTION INFILTRATION; PERINEURAL at 07:24

## 2020-02-03 RX ADMIN — OXYCODONE HYDROCHLORIDE 2.5 MG: 5 TABLET ORAL at 09:23

## 2020-02-03 RX ADMIN — FENTANYL CITRATE 25 MCG: 50 INJECTION, SOLUTION INTRAMUSCULAR; INTRAVENOUS at 09:21

## 2020-02-03 RX ADMIN — FENTANYL CITRATE 50 MCG: 50 INJECTION, SOLUTION INTRAMUSCULAR; INTRAVENOUS at 07:22

## 2020-02-03 RX ADMIN — FENTANYL CITRATE 25 MCG: 50 INJECTION, SOLUTION INTRAMUSCULAR; INTRAVENOUS at 07:38

## 2020-02-03 RX ADMIN — GABAPENTIN 300 MG: 300 CAPSULE ORAL at 06:25

## 2020-02-03 RX ADMIN — SODIUM CHLORIDE, SODIUM LACTATE, POTASSIUM CHLORIDE, CALCIUM CHLORIDE: 600; 310; 30; 20 INJECTION, SOLUTION INTRAVENOUS at 06:38

## 2020-02-03 RX ADMIN — PROPOFOL: 10 INJECTION, EMULSION INTRAVENOUS at 08:03

## 2020-02-03 RX ADMIN — ONDANSETRON 4 MG: 2 INJECTION INTRAMUSCULAR; INTRAVENOUS at 07:30

## 2020-02-03 RX ADMIN — ACETAMINOPHEN 975 MG: 325 TABLET ORAL at 06:25

## 2020-02-03 ASSESSMENT — MIFFLIN-ST. JEOR: SCORE: 1427.65

## 2020-02-03 NOTE — DISCHARGE INSTRUCTIONS
[facility]  Home Care Following Anesthesia  For 24 hours after surgery:  1. Get plenty of rest.  A responsible adult must stay with you for at least 24 hours after you leave the surgery center.  2. Do not drive or use heavy equipment.  If you have weakness or tingling, don't drive or use heavy equipment until this feeling goes away.   3. Do not drink alcohol.   4. Avoid strenuous or risky activities.  Ask for help when climbing stairs.  5. You may feel lightheaded.  IF so, sit for a few minutes before standing.  Have someone help you get up.   6. If you have nausea (feel sick to your stomach): Drink only clear liquids such as apple juice, ginger ale, broth or 7-Up.  Rest may also help.  Be sure to drink enough fluids.  Move to a regular diet as you feel able.   7. You may have a slight fever.  Call the doctor if your fever is over 100 F (37.7 C) (taken under the tongue) or lasts longer than 24 hours.  8. You may have a dry mouth, a sore throat, muscle aches or trouble sleeping. These should go away after 24 hours.  9. Do not make important or legal decisions.        Tips for taking pain medications  To get the best pain relief possible, remember these points:    Take pain medications as directed, before pain becomes severe.    Pain medication can upset your stomach: taking it with food may help.    Constipation is a common side effect of pain medication. Drink plenty of  fluids.    Eat foods high in fiber. Take a stool softener if recommended by your doctor or pharmacist.    Do not drink alcohol, drive or operate machinery while taking pain medications.    Ask about other ways to control pain, such as with heat, ice or relaxation.    Tylenol/Acetaminophen Consumption  To help encourage the safe use of acetaminophen, the makers of TYLENOL  have lowered the maximum daily dose for single-ingredient Extra Strength TYLENOL  (acetaminophen) products sold in the [place] from 8 pills per  day (4,000 mg) to 6 pills per day (3,000 mg). The dosing interval has also changed from 2 pills every 4-6 hours to 2 pills every 6 hours.    If you feel your pain relief is insufficient, you may take Tylenol/Acetaminophen in addition to your narcotic pain medication.     Be careful not to exceed 3,000 mg of Tylenol/Acetaminophen in a 24 hour period from all sources.    If you are taking extra strength Tylenol/acetaminophen (500 mg), the maximum dose is 6 tablets in 24 hours.    If you are taking regular strength acetaminophen (325 mg), the maximum dose is 9 tablets in 24 hours.    Call a doctor for any of the followin. Signs of infection (fever, growing tenderness at the surgery site, a large amount of drainage or bleeding, severe pain, foul-smelling drainage, redness, swelling).  2. It has been over 8 to 10 hours since surgery and you are still not able to urinate (pass water).  3. Headache for over 24 hours.  Your doctor is:       Dr. Mikhail Hernández, Ophthalmology: [phone]               Or dial [phone] and ask for the resident on call for:  Ophthalmology  For emergency care, call the:  [facility]:  [phone] (TTY for hearing impaired: [phone])

## 2020-02-03 NOTE — BRIEF OP NOTE
[facility] Brief Operative Note    Pre-operative diagnosis: Double vision [H53.2]   Post-operative diagnosis Same   Procedure: Procedure(s):  BILATERAL STRABISMUS REPAIR   Surgeon: Mikhail Hernández MD    Assistants(s): Sudarshan Edwards MD    Estimated blood loss: Less than 1 cc    Specimens: None   Findings: See full operative report       Sudarshan Edwards MD  Ophthalmology Resident  PGY-3

## 2020-02-03 NOTE — OP NOTE
"ATTENDING SURGEON:  Mikhail Hernández MD    DATE OF PROCEDURE:  February 3, 2020    FIRST SURGICAL ASSISTANT:  ARIANA RAMAN MD (PGY3)     ANESTHESIA:  General anesthesia    PREOPERATIVE DIAGNOSIS (ES):  1. Myopia-associated progressive esotropia     POSTOPERATIVE DIAGNOSIS (ES):  Same    NAME OF OPERATION:  1. Right medial rectus recession 5.0 mm  2. Left medial rectus recession 5.0 mm    INDICATIONS FOR PROCEDURE:    The patient is a pleasant 64-year-old  female with binocular diplopia since June 2019.  She has had stable strabismus measurements on serial sensorimotor exam.  An MRI was performed which did not show a structural etiology for her Doretha tropia although it did show an unrelated incidental meningioma which is being observed by neurosurgery.  Given her significant myopia this likely represents myopia associated progressive esotropia.  The patient had prism requirements that exceeded what she was likely to tolerate and was highly motivated for strabismus surgery to reduce her dependency on prism glasses.    I warned the patient about the risks, benefits, and alternatives of eye muscle surgery including a relatively small risk for severe infection, retinal detachment, and permanent vision loss of the eye.  I also discussed the possibility of postoperative double vision.  I discussed the possibility that due to postoperative double vision or a postoperative recurrent strabismus, the patient may require additional strabismus procedures in the future.  Understanding these risks, the patient decided to proceed with strabismus surgery.      DESCRIPTION OF PROCEDURE:    After the risks, benefits, and alternatives of eye muscle surgery were discussed with the patient and the patient's questions were answered both in clinic and on the day of surgery, written informed consent was obtained and witnessed in the preoperative holding area on the day of surgery.  The word \"yes\" was written over both eyes " indicating that the patient consented to eye muscle correction in both eyes.  An intravenous line was placed and light intravenous sedation was given.  The patient was transported to the operating room where after appropriate monitors were placed, the patient underwent induction of general anesthesia without complication.      Both eyes were prepped and draped in the usual sterile fashion for ophthalmic surgery and a lid speculum was placed in the right eye.  Forced duction testing in this eye revealed no restriction.  A trapezoidal nasal conjunctival flap was created using conjunctival forceps and Celeste scissors.  This was reflected backwards to expose the right medial rectus muscle which was isolated using a [place] muscle hook.  The muscle was freed from Tenon's capsule with blunt dissection using a Celeste scissors and cotton swab.  A double armed 6-0 Vicryl suture was then woven across the width of the muscle near it's insertion of the globe and tied to the edges.  The muscle was disinserted from the globe using Celeste scissors.  While we were initially considering adjustable suture in this case it became apparent at this time that the right medial rectus muscle did have not have significant enough tension to successfully perform a hang-back technique.  Unless sutured to the globe the right medial rectus would only hang back an estimated 3.5 to 4 mm.  Both arms of the 6-0 Vicryl suture were then passed partial thickness through the sclera at a point measured to be 5.0 mm posterior to the physiologic muscle insertion using a caliper.  At this point, the ends of the suture were tied together.  The needles were cut off and the ends were cut short.  The conjunctival flap was then re-opposed in the surgical bed and held in place using two 6-0 plain gut sutures.  The lid speculum was removed from the operative eye.     A lid speculum was placed in the left eye.  Forced duction testing in this eye revealed no  restriction.  A trapezoidal nasal conjunctival flap was created using conjunctival forceps and Celeste scissors.  This was reflected backwards to expose the left medial rectus muscle which was isolated using a [place] muscle hook.  The muscle was freed from Tenon's capsule with blunt dissection using a Celeste scissors and cotton swab.  A double armed 6-0 Vicryl suture was then woven across the width of the muscle near it's insertion of the globe and tied to the edges.  The muscle was disinserted from the globe using Celeste scissors.  While we were initially considering adjustable suture, again it became apparent at this time that the left medial rectus muscle did have not have significant enough tension to successfully perform a hang-back technique.  Unless sutured to the globe the left medial rectus would only hang back an estimated 3.5 to 4 mm. Both arms of the 6-0 Vicryl suture were then passed partial thickness through the sclera at a point measured to be 5.0 mm posterior to the physiologic muscle insertion using a caliper.  At this point, the ends of the suture were tied together.  The needles were cut off and the ends were cut short.  The conjunctival flap was then re-opposed in the surgical bed and held in place using two 6-0 plain gut sutures.  The lid speculum was removed from the operative eye.    Maxitrol ointment was placed in both eyes.  The patient was awakened from general anesthesia without complication and discharged to the recovery room in stable condition.       SPECIMENS REMOVED:  None.      ESTIMATED BLOOD LOSS:  1 mL or less.    INTRAOPERATIVE FLUIDS:  As per anesthesia records.      SPONGE/INSTRUMENT/NEEDLE COUNTS:  All sponge, instrument, and needle counts were correct times two.    CONDITION ON DISCHARGE FROM OPERATING ROOM:  Stable.      COMPLICATIONS:  None.     I was present for the entire procedure

## 2020-02-03 NOTE — ANESTHESIA PREPROCEDURE EVALUATION
Anesthesia Pre-Procedure Evaluation    Patient: Neema Nixon   MRN:     [id number] Gender:   female   Age:    64 year old :      1955        Preoperative Diagnosis: Double vision [H53.2]   Procedure(s):  BILATERAL STRABISMUS REPAIR WITH POSSIBLE USE OF ADJUSTABLE SUTUE IN 1 OR BOTH EYES.     History reviewed. No pertinent past medical history.   History reviewed. No pertinent surgical history.       Anesthesia Evaluation     . Pt has had prior anesthetic.     No history of anesthetic complications          ROS/MED HX    ENT/Pulmonary: Comment: strabismus      Neurologic: Comment: Benign brain tumor      Cardiovascular:  - neg cardiovascular ROS       METS/Exercise Tolerance:     Hematologic:  - neg hematologic  ROS       Musculoskeletal:  - neg musculoskeletal ROS       GI/Hepatic:     (+) GERD       Renal/Genitourinary:  - ROS Renal section negative       Endo:  - neg endo ROS       Psychiatric:  - neg psychiatric ROS       Infectious Disease:  - neg infectious disease ROS       Malignancy:   (+)   Basal skin CA        Other:    - neg other ROS                     PHYSICAL EXAM:   Mental Status/Neuro: A/A/O   Airway: Facies: Feasible  Mallampati: I  Mouth/Opening: Full  TM distance: > 6 cm  Neck ROM: Full   Respiratory: Auscultation: CTAB     Resp. Rate: Normal     Resp. Effort: Normal      CV: Rhythm: Regular  Rate: Age appropriate  Heart: Normal Sounds  Edema: None   Comments:      Dental: Normal Dentition                LABS:  CBC: No results found for: WBC, HGB, HCT, PLT  BMP: No results found for: NA, POTASSIUM, CHLORIDE, CO2, BUN, CR, GLC  COAGS: No results found for: PTT, INR, FIBR  POC: No results found for: BGM, HCG, HCGS  OTHER: No results found for: PH, LACT, A1C, GINO, PHOS, MAG, ALBUMIN, PROTTOTAL, ALT, AST, GGT, ALKPHOS, BILITOTAL, BILIDIRECT, LIPASE, AMYLASE, RUSS, TSH, T4, T3, CRP, SED     Preop Vitals    BP Readings from Last 3 Encounters:   19 138/80   17 142/84    Pulse  "Readings from Last 3 Encounters:   09/06/19 71   12/26/17 72      Resp Readings from Last 3 Encounters:   09/06/19 16    SpO2 Readings from Last 3 Encounters:   09/06/19 98%   12/26/17 97%      Temp Readings from Last 1 Encounters:   12/26/17 36.4  C (97.6  F) (Oral)    Ht Readings from Last 1 Encounters:   12/26/17 1.715 m (5' 7.5\")      Wt Readings from Last 1 Encounters:   09/06/19 86 kg (189 lb 9.6 oz)    Estimated body mass index is 29.26 kg/m  as calculated from the following:    Height as of 12/26/17: 1.715 m (5' 7.5\").    Weight as of 9/6/19: 86 kg (189 lb 9.6 oz).     LDA:        Assessment:   ASA SCORE: 1    H&P: History and physical reviewed and following examination; no interval change.   Smoking Status:  Non-Smoker/Unknown   NPO Status: NPO Appropriate     Plan:   Anes. Type:  General   Pre-Medication: None   Induction:  IV (Standard)   Airway: LMA   Access/Monitoring: PIV   Maintenance: TIVA     Postop Plan:   Postop Pain: Opioids  Postop Sedation/Airway: Not planned  Disposition: Outpatient     PONV Management:   Adult Risk Factors: Female, Non-Smoker, Postop Opioids   Prevention: Ondansetron, Dexamethasone, No Volatiles     CONSENT: Direct conversation   Plan and risks discussed with: Patient   Blood Products: Consent Deferred (Minimal Blood Loss)                   Ash Johansen MD  "

## 2020-02-03 NOTE — ANESTHESIA CARE TRANSFER NOTE
Patient: Neema Nixon    Procedure(s):  BILATERAL STRABISMUS REPAIR    Diagnosis: Double vision [H53.2]  Diagnosis Additional Information: No value filed.    Anesthesia Type:   General     Note:  Airway :Room Air  Patient transferred to:PACU  Handoff Report: Identifed the Patient, Identified the Reponsible Provider, Reviewed the pertinent medical history, Discussed the surgical course, Reviewed Intra-OP anesthesia mangement and issues during anesthesia, Set expectations for post-procedure period and Allowed opportunity for questions and acknowledgement of understanding      Vitals: (Last set prior to Anesthesia Care Transfer)    CRNA VITALS  2/3/2020 0753 - 2/3/2020 0827      2/3/2020             Pulse:  83    SpO2:  98 %    Resp Rate (observed):  17                Electronically Signed By: MEKA Hylton CRNA  February 3, 2020  8:27 AM

## 2020-02-03 NOTE — ANESTHESIA POSTPROCEDURE EVALUATION
Anesthesia POST Procedure Evaluation    Patient: Neema Nixon   MRN:     [id number] Gender:   female   Age:    64 year old :      1955        Preoperative Diagnosis: Double vision [H53.2]   Procedure(s):  BILATERAL STRABISMUS REPAIR   Postop Comments: No value filed.       Anesthesia Type:  Not documented  General    Reportable Event: NO     PAIN: Uncomplicated   Sign Out status: Comfortable, Well controlled pain     PONV: No PONV   Sign Out status:  No Nausea or Vomiting     Neuro/Psych: Uneventful perioperative course   Sign Out Status: Preoperative baseline; Age appropriate mentation     Airway/Resp.: Uneventful perioperative course   Sign Out Status: Non labored breathing, age appropriate RR; Resp. Status within EXPECTED Parameters     CV: Uneventful perioperative course   Sign Out status: Appropriate BP and perfusion indices; Appropriate HR/Rhythm     Disposition:   Sign Out in:  PACU  Disposition:  Phase II; Home  Recovery Course: Uneventful  Follow-Up: Not required           Last Anesthesia Record Vitals:  CRNA VITALS  2/3/2020 0753 - 2/3/2020 0853      2/3/2020             Pulse:  83    SpO2:  98 %    Resp Rate (observed):  17          Last PACU Vitals:  Vitals Value Taken Time   /72 2/3/2020  9:00 AM   Temp 36.7  C (98  F) 2/3/2020  8:30 AM   Pulse 64 2/3/2020  9:00 AM   Resp 14 2/3/2020  9:04 AM   SpO2 95 % 2/3/2020  9:04 AM   Temp src     NIBP     Pulse     SpO2     Resp     Temp     Ht Rate     Temp 2     Vitals shown include unvalidated device data.      Electronically Signed By: Jamaal Hardy MD, February 3, 2020, 10:10 AM

## 2020-02-12 ENCOUNTER — OFFICE VISIT (OUTPATIENT)
Dept: OPHTHALMOLOGY | Facility: CLINIC | Age: 65
End: 2020-02-12
Attending: OPHTHALMOLOGY
Payer: COMMERCIAL

## 2020-02-12 DIAGNOSIS — H50.05 ESOTROPIA, ALTERNATING: Primary | ICD-10-CM

## 2020-02-12 PROCEDURE — G0463 HOSPITAL OUTPT CLINIC VISIT: HCPCS | Mod: ZF

## 2020-02-12 ASSESSMENT — REFRACTION_WEARINGRX
OS_CYLINDER: +1.25
OS_SPHERE: -12.25
OD_SPHERE: -11.00
OD_CYLINDER: SPHERE
OS_AXIS: 038

## 2020-02-12 ASSESSMENT — TONOMETRY
IOP_METHOD: ICARE
OD_IOP_MMHG: 15
OS_IOP_MMHG: 16

## 2020-02-12 ASSESSMENT — VISUAL ACUITY
OD_CC+: +3
CORRECTION_TYPE: GLASSES
OD_CC: 20/25
OS_CC: 20/30
OS_CC+: +2
METHOD: SNELLEN - LINEAR

## 2020-02-12 NOTE — NURSING NOTE
Chief Complaints and History of Present Illnesses   Patient presents with     Post Op (Ophthalmology) Both Eyes      Chief Complaint(s) and History of Present Illness(es)     Post Op (Ophthalmology) Both Eyes     Laterality: both eyes    Associated symptoms: Negative for eye pain and double vision    Treatments tried: ointment              Comments     Post op of Right medial rectus recession 5.0 mm, Left medial rectus recession 5.0 mm 2/3/2020  Feels she cannot move her left eye out as far, improving though.  Denies swelling, irritation.  Denies diplopia.  Eye meds: antibiotic ointment each eye   ADRIANNA Merritt 2/12/2020 9:42 AM

## 2020-02-12 NOTE — PROGRESS NOTES
1. 1 week post-op status post strabismus surgery:     -Surgery: February 3, 2020  1. Right medial rectus recession 5.0 mm  2. Left medial rectus recession 5.0 mm    - Alignment: Doing great.  - Diplopia: patient denies  - Healing up appropriately  - Maxitrol ophthalmic ointment: stop  - Start Predforte 1% four times a day in the operative eye(s) and decrease by one drop daily every week.  Course will complete in 1 month.    Return to clinic in 3 months or sooner as needed.      Follow-up in May with contact lens optometry for consideration of contact lenses again given her level of high myopia.  Eyes will be well healed from strabismus surgery by that time.     Complete documentation of historical and exam elements from today's encounter can be found in the full encounter summary report (not reduplicated in this progress note).  I personally obtained the chief complaint(s) and history of present illness.  I confirmed and edited as necessary the review of systems, past medical/surgical history, family history, social history, and examination findings as documented by others; and I examined the patient myself.  I personally reviewed the relevant tests, images, and reports as documented above.  I formulated and edited as necessary the assessment and plan and discussed the findings and management plan with the patient and family     Mikhail Hernández MD

## 2020-02-18 ENCOUNTER — TELEPHONE (OUTPATIENT)
Dept: OPHTHALMOLOGY | Facility: CLINIC | Age: 65
End: 2020-02-18

## 2020-02-18 NOTE — TELEPHONE ENCOUNTER
Called patient to discuss her acute onset painless red eye yesterday in the right eye. She has no new vision blurring.  Photos look consistent with Subconjunctival hemorrhage.  Discussed with patient and reassured her.  Told her to call us right away with eye pain or vision changes. Patient understood and agreed.

## 2020-02-23 ENCOUNTER — HEALTH MAINTENANCE LETTER (OUTPATIENT)
Age: 65
End: 2020-02-23

## 2020-04-29 ENCOUNTER — MYC MEDICAL ADVICE (OUTPATIENT)
Dept: OPHTHALMOLOGY | Facility: CLINIC | Age: 65
End: 2020-04-29

## 2020-06-24 ENCOUNTER — OFFICE VISIT (OUTPATIENT)
Dept: OPHTHALMOLOGY | Facility: CLINIC | Age: 65
End: 2020-06-24
Attending: OPHTHALMOLOGY
Payer: COMMERCIAL

## 2020-06-24 ENCOUNTER — ANCILLARY PROCEDURE (OUTPATIENT)
Dept: MRI IMAGING | Facility: CLINIC | Age: 65
End: 2020-06-24
Attending: NEUROLOGICAL SURGERY
Payer: COMMERCIAL

## 2020-06-24 ENCOUNTER — OFFICE VISIT (OUTPATIENT)
Dept: OPTOMETRY | Facility: CLINIC | Age: 65
End: 2020-06-24
Payer: COMMERCIAL

## 2020-06-24 ENCOUNTER — TELEPHONE (OUTPATIENT)
Dept: OPHTHALMOLOGY | Facility: CLINIC | Age: 65
End: 2020-06-24

## 2020-06-24 DIAGNOSIS — D32.9 MENINGIOMA (H): ICD-10-CM

## 2020-06-24 DIAGNOSIS — H53.2 DOUBLE VISION: ICD-10-CM

## 2020-06-24 DIAGNOSIS — H44.23 UNCOMPLICATED DEGENERATIVE MYOPIA OF BOTH EYES: Primary | ICD-10-CM

## 2020-06-24 DIAGNOSIS — H52.203 MYOPIA OF BOTH EYES WITH ASTIGMATISM AND PRESBYOPIA: ICD-10-CM

## 2020-06-24 DIAGNOSIS — H53.10 SUBJECTIVE VISUAL DISTURBANCE: ICD-10-CM

## 2020-06-24 DIAGNOSIS — H50.05 ESOTROPIA, ALTERNATING: Primary | ICD-10-CM

## 2020-06-24 DIAGNOSIS — H52.4 MYOPIA OF BOTH EYES WITH ASTIGMATISM AND PRESBYOPIA: ICD-10-CM

## 2020-06-24 DIAGNOSIS — H52.13 MYOPIA OF BOTH EYES WITH ASTIGMATISM AND PRESBYOPIA: ICD-10-CM

## 2020-06-24 LAB — RADIOLOGIST FLAGS: ABNORMAL

## 2020-06-24 PROCEDURE — A9585 GADOBUTROL INJECTION: HCPCS | Mod: JW

## 2020-06-24 PROCEDURE — G0463 HOSPITAL OUTPT CLINIC VISIT: HCPCS

## 2020-06-24 PROCEDURE — 70553 MRI BRAIN STEM W/O & W/DYE: CPT | Mod: TC

## 2020-06-24 RX ORDER — GADOBUTROL 604.72 MG/ML
10 INJECTION INTRAVENOUS ONCE
Status: COMPLETED | OUTPATIENT
Start: 2020-06-24 | End: 2020-06-24

## 2020-06-24 RX ADMIN — GADOBUTROL 8.5 ML: 604.72 INJECTION INTRAVENOUS at 10:45

## 2020-06-24 ASSESSMENT — VISUAL ACUITY
CORRECTION_TYPE: GLASSES
METHOD: SNELLEN - LINEAR
OD_CC: 20/25
OS_CC: 20/25
METHOD: SNELLEN - LINEAR
OD_CC: 20/25
CORRECTION_TYPE: GLASSES
OS_CC: 20/25

## 2020-06-24 ASSESSMENT — REFRACTION_CURRENTRX
OS_BRAND: ACUVUE OASYS ASTIGMATISM
OD_BASECURVE: 8.6
OS_BRAND: ACUVUE OASYS ASTIGMATISM
OD_SPHERE: -9.00
OD_CYLINDER: -1.25
OD_AXIS: 100
OD_AXIS: 100
OS_SPHERE: -9.00
OS_CYLINDER: -0.75
OS_DIAMETER: 14.5
OS_BASECURVE: 8.6
OD_BASECURVE: 8.6
OS_DIAMETER: 14.2
OS_BRAND: AIR OPTIX MULTIFOCAL
OS_ADD: HIGH
OD_DIAMETER: 14.2
OD_CYLINDER: -1.25
OD_ADD: HIGH
OS_CYLINDER: -0.75
OS_DIAMETER: 14.5
OD_BRAND: AIR OPTIX MULTIFOCAL
OD_BRAND: ACUVUE OASYS ASTIGMATISM
OS_AXIS: 090
OD_BASECURVE: 8.6
OS_SPHERE: -9.00
OD_BRAND: ACUVUE OASYS ASTIGMATISM
OD_DIAMETER: 14.5
OS_BASECURVE: 8.6
OS_SPHERE: -9.50
OD_DIAMETER: 14.5
OS_BASECURVE: 8.6
OD_SPHERE: -7.00
OS_AXIS: 090
OD_SPHERE: -9.50

## 2020-06-24 ASSESSMENT — REFRACTION_MANIFEST
OD_AXIS: 015
OD_SPHERE: -11.50
OD_CYLINDER: +1.50
OS_CYLINDER: +1.25
OS_SPHERE: -12.25
OS_AXIS: 020

## 2020-06-24 ASSESSMENT — REFRACTION_WEARINGRX
OS_AXIS: 038
OD_CYLINDER: SPHERE
OD_SPHERE: -11.00
OD_CYLINDER: SPHERE
OS_CYLINDER: +1.25
OD_SPHERE: -11.00
OS_CYLINDER: +1.25
OS_AXIS: 038
OS_SPHERE: -12.25
OS_SPHERE: -12.25

## 2020-06-24 ASSESSMENT — SLIT LAMP EXAM - LIDS
COMMENTS: MGD
COMMENTS: MGD
COMMENTS: NORMAL
COMMENTS: NORMAL

## 2020-06-24 ASSESSMENT — EXTERNAL EXAM - LEFT EYE: OS_EXAM: NORMAL

## 2020-06-24 ASSESSMENT — TONOMETRY
OD_IOP_MMHG: 10
IOP_METHOD: ICARE
OS_IOP_MMHG: 10

## 2020-06-24 ASSESSMENT — EXTERNAL EXAM - RIGHT EYE: OD_EXAM: NORMAL

## 2020-06-24 ASSESSMENT — CONF VISUAL FIELD
OD_NORMAL: 1
OS_NORMAL: 1

## 2020-06-24 NOTE — LETTER
2020    RE: Neema Nixon  : 1955  MRN: [id number]    Dear Providers,    I saw our mutual patient, Neema Nixon, in follow-up in my clinic recently.  After a thorough neuro-ophthalmic history and examination, I came to the following conclusions:     1. 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 Raza.  The patient recently underwent a repeat MRI.  I encouraged her to keep following with Dr. Raza.  If she has any vision problems in the future I be happy to see her back any time.    Addendum:  After the patient's visit I just reviewed her radiology report from the MRI today.  It was read by radiology as showing significant growth since her prior MRI in 2019.  While she does not have evidence of an optic neuropathy today I do believe that I should see her back given this update.  I will have my fellow Dr. Cheatham reach out to the patient and schedule her for a six-month follow-up with me.    The patient has healed up well and has excellent alignment with only a 2 prism diopter esophoria in primary gaze that she fuses easily.  She is interested in obtaining a contact lens prescription today.  We discussed that this disorder can be progressive and that she may have a need for prisms in the future or even additional strabismus surgery.  If she has increasing diplopia in the future she should return to see me.    -Surgery: February 3, 2020  1. Right medial rectus recession 5.0 mm  2. Left medial rectus recession 5.0 mm    - Alignment: E 2 and ortho'  - Diplopia: None    Recommend follow up as needed. Continue to follow with Dr. Raza regarding meningioma.             For further exam details, please feel free to contact our office for additional records.  If you wish to contact me regarding this patient please email me at [email or web address] or give my clinic a call to arrange a phone  conversation.      Sincerely,    Mikhail Hernández MD  , Neuro-Ophthalmology and Adult Strabismus Surgery  The Jaclyn Rodrigues Chair in Neuro-Ophthalmology  Department of Ophthalmology and Visual Neurosciences  [facility]

## 2020-06-24 NOTE — PROGRESS NOTES
1. 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 Raza.  The patient recently underwent a repeat MRI.  I encouraged her to keep following with Dr. Raza.  If she has any vision problems in the future I be happy to see her back any time.    Addendum:  After the patient's visit I just reviewed her radiology report from the MRI today.  It was read by radiology as showing significant growth since her prior MRI in August 2019.  While she does not have evidence of an optic neuropathy today I do believe that I should see her back given this update.  I will have my fellow Dr. Cheatham reach out to the patient and schedule her for a six-month follow-up with me.    The patient has healed up well and has excellent alignment with only a 2 prism diopter esophoria in primary gaze that she fuses easily.  She is interested in obtaining a contact lens prescription today.  We discussed that this disorder can be progressive and that she may have a need for prisms in the future or even additional strabismus surgery.  If she has increasing diplopia in the future she should return to see me.    -Surgery: February 3, 2020  1. Right medial rectus recession 5.0 mm  2. Left medial rectus recession 5.0 mm    - Alignment: E 2 and ortho'  - Diplopia: None    Recommend follow up as needed. Continue to follow with Dr. Raza regarding meningioma.            Complete documentation of historical and exam elements from today's encounter can be found in the full encounter summary report (not reduplicated in this progress note).  I personally obtained the chief complaint(s) and history of present illness.  I confirmed and edited as necessary the review of systems, past medical/surgical history, family history, social history, and examination findings as documented by others; and I examined the patient myself.  I personally reviewed the relevant tests,  images, and reports as documented above.  I formulated and edited as necessary the assessment and plan and discussed the findings and management plan with the patient and family.  I personally reviewed the ophthalmic test(s) associated with this encounter, agree with the interpretation(s) as documented by the resident/fellow, and have edited the corresponding report(s) as necessary.     MD Rodri Guidry MD  Ophthalmology, PGY-5  Neuro-Ophthalmology Fellow

## 2020-06-24 NOTE — NURSING NOTE
Chief Complaints and History of Present Illnesses   Patient presents with     Diplopia Follow-Up     Chief Complaint(s) and History of Present Illness(es)     Diplopia Follow-Up               Comments     Neema Nixon is a 65 year old female who presents today for 4m post-op    -Surgery: February 3, 2020  1. Right medial rectus recession 5.0 mm  2. Left medial rectus recession 5.0 mm     Happy with surgical results.

## 2020-06-24 NOTE — TELEPHONE ENCOUNTER
I called the pt and informed her of interval growth on MRI today and that she should see Dr. Hernández in 6 months for follow up due to proximity of lesion to right optic nerve. Patient expressed understanding and will follow up with Dr. Raza.     Rodri Cheatham MD  Ophthalmology, PGY-5  Neuro-Ophthalmology Fellow    
no

## 2020-06-25 NOTE — PROGRESS NOTES
A/P  1.) Myopia/Astig/Presbyopia each eye  -s/p strab surgery with Dr. Hernández, doing well without diplopia  -High myopia with h/o CL wear (RGP and soft)  -Today responds best in monovision right eye near. Corrects well with distance CL's but some visual discomfort. Poor response to multifocal soft lens  -Pt demo'd I&R in office, reviewed CL care and hygiene    Rec trial with monovision vs DVO CL's. Okay to order if working well. We can consider RGP if these options fail. F/u prn here

## 2020-06-30 ENCOUNTER — TELEPHONE (OUTPATIENT)
Dept: OPHTHALMOLOGY | Facility: CLINIC | Age: 65
End: 2020-06-30

## 2020-06-30 NOTE — TELEPHONE ENCOUNTER
06/30/2020 09:41 AM Phone (Outgoing) Neema Nixon (Self) [phone] (H) Remove   Left Message - Left mesage with patient's  with call back number for scheduling f/u before patient leaves [facility]       By Tarah Guevara, MD Ismael Bond Eleanor    Caller: Unspecified (5 days ago,  8:48 AM)               She can see me in 3 months before she leaves and then in 6 months upon her return.     It is doubtful that we can find her a neuro-ophthalmologist in [facility].   Her follow-up is very important.     Thank you. - Mikhail                                                                                                                 ----- Message -----   From: Tarah Guevara   Sent: 6/25/2020   8:53 AM CDT   To: Mikhail Hernández MD     ----- Message from Tarah Guevara sent at 6/25/2020  8:53 AM CDT -----   Patient will not be able to schedule 6 months out. She will be here for another 3 months but not back until around 10 months out. Leaving around September and gone through April 2021. She said she could come back August but concerned that would be too soon and wondering if waiting until April would be too long? It would have to be one or the other, she said returning here for f/u would not be possible. What do you think?

## 2020-06-30 NOTE — TELEPHONE ENCOUNTER
RECORDS RECEIVED FROM: Internal   Date of Appt: 7/15/20   NOTES (FOR ALL VISITS) STATUS DETAILS   OFFICE NOTE from referring provider Internal Dr Raza @ [facility] Neurosurgery:  9/6/19   OFFICE NOTE from other specialist Internal Dr Hernández @ [facility] Eye:  6/24/20  2/12/20  10/10/19  7/30/19   DISCHARGE SUMMARY from hospital N/A    DISCHARGE REPORT from the ER N/A    OPERATIVE REPORT Internal [facility] CSC:  2/3/20 BILATERAL STRABISMUS REPAIR     MEDICATION LIST Internal    IMAGING  (FOR ALL VISITS)     EMG N/A    EEG N/A    MRI (HEAD, NECK, SPINE) In process [facility]:  MRI Brain 6/24/20  MRI Brain 8/6/19    [facility]:  MRI Brain 11/5/19   LUMBAR PUNCTURE N/A    CICI Scan N/A    CT (HEAD, NECK, SPINE) N/A       Action 6/30/20 MV 10.48am   Action Taken Imaging request faxed to [facility] for:  MRI Brain 11/5/19  Fax #: [phone]     Action 07/02/20 1:59 PM - Elonela   Action Taken  Imaging disc and reports received from [facility] and sent to [facility]- to be uploaded into PACs.

## 2020-07-01 NOTE — TELEPHONE ENCOUNTER
Spoke with pt today and set up appointments for September (prior to pt leaving town for the winter) and May (after pt's return to the [facility]), per Dr. Hernández's recommendation.    ADA Soto 11:36 AM July 1, 2020

## 2020-07-14 NOTE — PROGRESS NOTES
[facility]  Department of Neurosurgery  Center for Skull Base and Pituitary Surgery      Neema Nixon   65 year old female who is being evaluated via a video visit      CC:  Growing right clinoid meningioma, new patient visit    Participants: patient    HPI:  Ms. Nixon is a 65 year old right-handed female who initially presented with diplopia and was found with an incidental right anterior clinoidal meningioma on MRI.  For her diplopia, she underwent strabismus surgery with Dr. Hernández and had good improvement. She was followed for her meningioma by Dr. Blake Raza, who repeated the MRI which showed increased tumor size and the start of edema in the adjacent parenchyma over just a 7 month interval. She was referred here for management.      She has not noticed any vision changes. Only has a little tightness along the outside of her left eye.     I have reviewed and updated the patient's Past Medical History (as above, GERD),  Allergies (sulfa), Social History (retired, formerly an ; lifetime nonsmoker), Family History and Medication List (vitamin D, B12).    Exam by video:  Per Dr. Souza's note 7/30/2019: 20/25 bilaterally with glasses  Fluent, pleasant  EOMI  Face symmetric    Imaging:  We reviewed her initial MRI in 8/2019, followup in 11/2019 and recent scan 6/24/2020. These demonstrate a growing right clinoidal meningioma now measuring 18mm x 15mm that appears to have a dural tail that may extend toward the optic canal. There is new FLAIR superior to the tumor in the frontal lobe.    Assessment:  1. Growing right clinoidal meningioma, new edema  2. Myopia-associated esotropia s/p strabismus surgery with Dr. Hernández    Plan:  1. We reviewed the imaging findings and options for management, including surveillance, radiation, and microsurgical resection. We discussed the relative advantages and disadvantages/risks of each approach. Patient would like to consider her options. We  "relayed to her that, while it is slowly growing, she has time to consider her options and we would be happy to provide her with a referral to radiation oncology and have input from Dr. Hernández again if helpful for her.  2. Warning signs for which patient will contact us were reviewed.    Visit:  Video started at 11:09am  Video ended at 11:36am      Deni De Paz MD      Patient statement: cannot visit in person due to Covid19    Physician has received verbal consent for a Video Visit from the patient? Yes    Patient would like the video invitation sent by: Am Well    Originating Location (pt. Location): Home    Distant Location (provider location):  [facility] NEUROSURGERY     Mode of Communication:  Video Conference via [facility]      Neema Nixon is a 65 year old female who is being evaluated via a billable video visit.      The patient has been notified of following:     \"This video visit will be conducted via a call between you and your physician/provider. We have found that certain health care needs can be provided without the need for an in-person physical exam.  This service lets us provide the care you need with a video conversation.  If a prescription is necessary we can send it directly to your pharmacy.  If lab work is needed we can place an order for that and you can then stop by our lab to have the test done at a later time.    If during the course of the call the physician/provider feels a video visit is not appropriate, you will not be charged for this service.\"       "

## 2020-07-15 ENCOUNTER — PRE VISIT (OUTPATIENT)
Dept: NEUROSURGERY | Facility: CLINIC | Age: 65
End: 2020-07-15

## 2020-07-15 ENCOUNTER — VIRTUAL VISIT (OUTPATIENT)
Dept: NEUROSURGERY | Facility: CLINIC | Age: 65
End: 2020-07-15
Payer: COMMERCIAL

## 2020-07-15 DIAGNOSIS — D32.9 MENINGIOMA (H): Primary | ICD-10-CM

## 2020-07-15 RX ORDER — LANOLIN ALCOHOL/MO/W.PET/CERES
CREAM (GRAM) TOPICAL EVERY MORNING
COMMUNITY

## 2020-07-15 NOTE — PROGRESS NOTES
"Neema Nixon is a 65 year old female who is being evaluated via a billable video visit.      The patient has been notified of following:     \"This video visit will be conducted via a call between you and your physician/provider. We have found that certain health care needs can be provided without the need for an in-person physical exam.  This service lets us provide the care you need with a video conversation.  If a prescription is necessary we can send it directly to your pharmacy.  If lab work is needed we can place an order for that and you can then stop by our lab to have the test done at a later time.    Video visits are billed at different rates depending on your insurance coverage.  Please reach out to your insurance provider with any questions.    If during the course of the call the physician/provider feels a video visit is not appropriate, you will not be charged for this service.\"    Patient has given verbal consent for Video visit? YES  How would you like to obtain your AVS? [facility]  Patient would like the video invitation sent by:Text [phone]  Will anyone else be joining your video visit? no        Video-Visit Details    Type of service:  Video Visit    Video Start Time:  Video End Time:     Originating Location (pt. Location): Home    Distant Location (provider location):  [facility] NEUROSURGERY     Platform used for Video Visit: Elvira Lopez EMT          "

## 2020-07-15 NOTE — LETTER
[place] FOR SKULL BASE AND PITUITARY SURGERY  [facility] NEUROSURGERY  [address] [address]  Phone: [phone]  Fax: [phone]          7/15/2020    RE:   Neema Nixon  [address]  [place] [address]      Dear Colleague,    Thank you for referring your patient, Neema Nixon, to the Center for Skull Base and Pituitary Surgery. Please see a copy of my visit note below.      [facility]  Department of Neurosurgery  [place] for Skull Base and Pituitary Surgery      Neema Nixon   65 year old female who is being evaluated via a video visit      CC:  Growing right clinoid meningioma, new patient visit    Participants: patient    HPI:  Ms. Nixon is a 65 year old right-handed female who initially presented with diplopia and was found with an incidental right anterior clinoidal meningioma on MRI.  For her diplopia, she underwent strabismus surgery with Dr. Hernández and had good improvement. She was followed for her meningioma by Dr. Blake Raza, who repeated the MRI which showed increased tumor size and the start of edema in the adjacent parenchyma over just a 7 month interval. She was referred here for management.      She has not noticed any vision changes. Only has a little tightness along the outside of her left eye.     I have reviewed and updated the patient's Past Medical History (as above, GERD),  Allergies (sulfa), Social History (retired, formerly an ; lifetime nonsmoker), Family History and Medication List (vitamin D, B12).    Exam by video:  Per Dr. Souza's note 7/30/2019: 20/25 bilaterally with glasses  Fluent, pleasant  EOMI  Face symmetric    Imaging:  We reviewed her initial MRI in 8/2019, followup in 11/2019 and recent scan 6/24/2020. These demonstrate a growing right clinoidal meningioma now measuring 18mm x 15mm that appears to have a dural tail that may extend toward the optic canal. There is new FLAIR superior to  the tumor in the frontal lobe.    Assessment:  1. Growing right clinoidal meningioma, new edema  2. Myopia-associated esotropia s/p strabismus surgery with Dr. Hernández    Plan:  1. We reviewed the imaging findings and options for management, including surveillance, radiation, and microsurgical resection. We discussed the relative advantages and disadvantages/risks of each approach. Patient would like to consider her options. We relayed to her that, while it is slowly growing, she has time to consider her options and we would be happy to provide her with a referral to radiation oncology and have input from Dr. Hernández again if helpful for her.  2. Warning signs for which patient will contact us were reviewed.    Visit:  Video started at 11:09am  Video ended at 11:36am      Deni De Paz MD      Patient statement: cannot visit in person due to Covid19    Physician has received verbal consent for a Video Visit from the patient? Yes    Patient would like the video invitation sent by: Am Well    Originating Location (pt. Location): Home    Distant Location (provider location):  [facility] NEUROSURGERY     Mode of Communication:  Video Conference via [facility]        Neemakey Nixon is a 65 year old female who is being evaluated via a billable video visit.        Video-Visit Details    Type of service:  Video Visit    Video Start Time:  Video End Time:     Originating Location (pt. Location): Home    Distant Location (provider location):  [facility] NEUROSURGERY     Platform used for Video Visit: Elvira Lopez, EMT

## 2020-07-15 NOTE — LETTER
7/15/2020       RE: Neema Nixon  [address]  [place] [address]     Dear Colleague,    Thank you for referring your patient, Neema Nixon, to the [facility] NEUROSURGERY at [facility]. Please see a copy of my visit note below.    [facility]  Department of Neurosurgery  Center for Skull Base and Pituitary Surgery      Neema Nixon   65 year old female who is being evaluated via a video visit      CC:  Growing right clinoid meningioma, new patient visit    Participants: patient    HPI:  Ms. Nixon is a 65 year old right-handed female who initially presented with diplopia and was found with an incidental right anterior clinoidal meningioma on MRI.  For her diplopia, she underwent strabismus surgery with Dr. Hernández and had good improvement. She was followed for her meningioma by Dr. Blake Raza, who repeated the MRI which showed increased tumor size and the start of edema in the adjacent parenchyma over just a 7 month interval. She was referred here for management.      She has not noticed any vision changes. Only has a little tightness along the outside of her left eye.     I have reviewed and updated the patient's Past Medical History (as above, GERD),  Allergies (sulfa), Social History (retired, formerly an ; lifetime nonsmoker), Family History and Medication List (vitamin D, B12).    Exam by video:  Per Dr. Souza's note 7/30/2019: 20/25 bilaterally with glasses  Fluent, pleasant  EOMI  Face symmetric    Imaging:  We reviewed her initial MRI in 8/2019, followup in 11/2019 and recent scan 6/24/2020. These demonstrate a growing right clinoidal meningioma now measuring 18mm x 15mm that appears to have a dural tail that may extend toward the optic canal. There is new FLAIR superior to the tumor in the frontal lobe.    Assessment:  1. Growing right clinoidal meningioma, new edema  2. Myopia-associated esotropia s/p strabismus surgery  "with Dr. Hernández    Plan:  1. We reviewed the imaging findings and options for management, including surveillance, radiation, and microsurgical resection. We discussed the relative advantages and disadvantages/risks of each approach. Patient would like to consider her options. We relayed to her that, while it is slowly growing, she has time to consider her options and we would be happy to provide her with a referral to radiation oncology and have input from Dr. Hernández again if helpful for her.  2. Warning signs for which patient will contact us were reviewed.    Visit:  Video started at 11:09am  Video ended at 11:36am      Deni De Paz MD      Patient statement: cannot visit in person due to Covid19    Physician has received verbal consent for a Video Visit from the patient? Yes    Patient would like the video invitation sent by: [facility]    Originating Location (pt. Location): Home    Distant Location (provider location):  [facility] NEUROSURGERY     Mode of Communication:  Video Conference via [employer]      Neema Nixon is a 65 year old female who is being evaluated via a billable video visit.      The patient has been notified of following:     \"This video visit will be conducted via a call between you and your physician/provider. We have found that certain health care needs can be provided without the need for an in-person physical exam.  This service lets us provide the care you need with a video conversation.  If a prescription is necessary we can send it directly to your pharmacy.  If lab work is needed we can place an order for that and you can then stop by our lab to have the test done at a later time.    If during the course of the call the physician/provider feels a video visit is not appropriate, you will not be charged for this service.\"         Neema Nixon is a 65 year old female who is being evaluated via a billable video visit.      The patient has been notified of following: " "    \"This video visit will be conducted via a call between you and your physician/provider. We have found that certain health care needs can be provided without the need for an in-person physical exam.  This service lets us provide the care you need with a video conversation.  If a prescription is necessary we can send it directly to your pharmacy.  If lab work is needed we can place an order for that and you can then stop by our lab to have the test done at a later time.    Video visits are billed at different rates depending on your insurance coverage.  Please reach out to your insurance provider with any questions.    If during the course of the call the physician/provider feels a video visit is not appropriate, you will not be charged for this service.\"    Patient has given verbal consent for Video visit? YES  How would you like to obtain your AVS? [facility]  Patient would like the video invitation sent by:Text [phone]  Will anyone else be joining your video visit? no        Video-Visit Details    Type of service:  Video Visit    Video Start Time:  Video End Time:     Originating Location (pt. Location): Home    Distant Location (provider location):  [facility] NEUROSURGERY     Platform used for Video Visit: Elvira Lopez, EMT            Again, thank you for allowing me to participate in the care of your patient.      Sincerely,    Deni De Paz MD      "

## 2020-07-15 NOTE — Clinical Note
July 15, 2020       TO: Neema Nixon  [address]  [place] [address]       DearMs.Hussain,    We are writing to inform you of your test results.    {[facility] results letter list:716755}    No results found from the In Basket message.    ***

## 2020-07-16 ENCOUNTER — TELEPHONE (OUTPATIENT)
Dept: OPHTHALMOLOGY | Facility: CLINIC | Age: 65
End: 2020-07-16

## 2020-07-16 ENCOUNTER — TELEPHONE (OUTPATIENT)
Dept: NEUROSURGERY | Facility: CLINIC | Age: 65
End: 2020-07-16

## 2020-07-17 ENCOUNTER — TELEPHONE (OUTPATIENT)
Dept: OPHTHALMOLOGY | Facility: CLINIC | Age: 65
End: 2020-07-17

## 2020-07-22 ENCOUNTER — OFFICE VISIT (OUTPATIENT)
Dept: OPHTHALMOLOGY | Facility: CLINIC | Age: 65
End: 2020-07-22
Attending: OPHTHALMOLOGY
Payer: COMMERCIAL

## 2020-07-22 DIAGNOSIS — H53.10 SUBJECTIVE VISUAL DISTURBANCE: Primary | ICD-10-CM

## 2020-07-22 DIAGNOSIS — H53.40 VISUAL FIELD DEFECT: ICD-10-CM

## 2020-07-22 DIAGNOSIS — H47.20 PARTIAL OPTIC ATROPHY: ICD-10-CM

## 2020-07-22 PROCEDURE — G0463 HOSPITAL OUTPT CLINIC VISIT: HCPCS | Mod: ZF | Performed by: TECHNICIAN/TECHNOLOGIST

## 2020-07-22 PROCEDURE — 92133 CPTRZD OPH DX IMG PST SGM ON: CPT | Mod: ZF | Performed by: OPHTHALMOLOGY

## 2020-07-22 PROCEDURE — 92083 EXTENDED VISUAL FIELD XM: CPT | Mod: ZF | Performed by: OPHTHALMOLOGY

## 2020-07-22 ASSESSMENT — SLIT LAMP EXAM - LIDS
COMMENTS: MGD
COMMENTS: MGD

## 2020-07-22 ASSESSMENT — VISUAL ACUITY
METHOD: SNELLEN - LINEAR
OD_CC: 20/20
OD_CC+: -3
CORRECTION_TYPE: GLASSES
OS_CC: 20/25

## 2020-07-22 ASSESSMENT — TONOMETRY
OS_IOP_MMHG: 13
IOP_METHOD: ICARE
OD_IOP_MMHG: 14

## 2020-07-22 ASSESSMENT — REFRACTION_WEARINGRX
OS_SPHERE: -12.25
OD_SPHERE: -11.00
OS_AXIS: 038
OD_CYLINDER: SPHERE
OS_CYLINDER: +1.25

## 2020-07-22 ASSESSMENT — CONF VISUAL FIELD
OS_NORMAL: 1
OD_NORMAL: 1
METHOD: COUNTING FINGERS

## 2020-07-22 ASSESSMENT — CUP TO DISC RATIO
OS_RATIO: 0.5
OD_RATIO: 0.5

## 2020-07-22 ASSESSMENT — EXTERNAL EXAM - RIGHT EYE: OD_EXAM: NORMAL

## 2020-07-22 ASSESSMENT — EXTERNAL EXAM - LEFT EYE: OS_EXAM: NORMAL

## 2020-07-22 NOTE — LETTER
2020    RE: Neema Nixon  : 1955  MRN: [id number]    Dear Providers,    I saw our mutual patient, Neema Nixon, in follow-up in my clinic recently.  After a thorough neuro-ophthalmic history and examination, I came to the following conclusions:     1. Right clinioid region meningioma-this mass is unrelated to her prior myopia associated esotropia which was previously the focus of my care for this patient.  Today we did more extensive testing of her afferent visual pathways.  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  Her OCT shows mild thinning of the nerve fiber layer in the right eye as compared to the left eye.  She did not have an afferent pupillary defect on the right nor did she have evidence of significant visual field loss or visual acuity / color vision loss in the right eye.  I believe we are therefore catching compression at the earliest stages.  Together we reviewed her 2019 MRI and her 2020 MRI clearly showing interval growth of the lesion and also showing the impending threat to the right optic nerve as the two essentially abut one another.  We discussed the 3 options on the table for this patient (from a vision perspective) including surgical excision, targetted radiation therapy, and observation.  Based upon the findings of early optic atrophy exhibited on OCT, I do not recommend observation.  I do think radiation therapy or surgical excision are reasonable given that there is not already a tremendous amount of mass-effect.  We did discuss that in the event of radiation therapy the tumor could continue to grow and the patient may later require surgical excision.  The patient is going to consider her options further and discuss the matter again with her neurosurgeon Dr. Triana.     Follow-up 2 months after radiation therapy or surgery. Follow-up in 3 months if patient defers any treatment.      This patient who previously had followed with me for myopia associated esotropia and was doing very well after our recent strabismus surgery has come back for further evaluation focused on her afferent visual system because her most recent MRI showed significant interval growth and a right clinoid region mass consistent with a meningioma.  We reviewed both MRIs today together.  I agree with the radiology interpretation on the most recent scan regarding interval growth.    The patient does not note any significant visual changes in regards to her right eye as compared to her left.    08/2019 MRI brain with and without contrast:  IMPRESSION: Ovoid uniformly enhancing 1.4 cm extra-axial dural-based  mass positioned along the superior aspect of the right anterior  clinoid process, most likely representing a meningioma.    06/24/20 MRI brain with and without IV contrast   IMPRESSION: Interval increase in size of extra-axial enhancing mass  arising superiorly off the anterior clinoid process. The mass, which  most likely represents a meningioma, currently measures 1.7 cm in  greatest dimensions as compared to 1.4 cm on the prior study. There is  also some new edema in the adjacent brain parenchyma.     Visual acuity today was 2020-3 in the right eye and 20/25 in the left eye (given her degree of myopia this is normal in both eyes).  Pupils were normal on my check without afferent pupillary defect.  The patient identified 11 of 11 Ishihara color plates in both eyes.  Slit lamp exam revealed mild mixed cataracts that were not visually significant in both eyes.  Dilated fundus exam showed tilted myopic appearing optic nerve heads in both eyes without clear evidence of optic nerve head pallor.    Octopus automated 30 degree visual field showed decent reliability and demonstrated mild scattered nonspecific deficits in both eyes.  My interpretation was that there was some mild testing artifact but no significant field  defect in either eye. The right eye was not clearly more depressed than the left.    OCT of the optic nerve head revealed mild thinning of the right optic nerve head superiorly as compared to age-matched controls and also as compared to patient's left eye.      For further exam details, please feel free to contact our office for additional records.  If you wish to contact me regarding this patient please email me at [email or web address] or give my clinic a call to arrange a phone conversation.    Sincerely,    Mikhail Hernández MD  , Neuro-Ophthalmology and Adult Strabismus Surgery  The Jaclyn ROBINS and Mara Rodrigues Chair in Neuro-Ophthalmology  Department of Ophthalmology and Visual Neurosciences  [facility]    DX: Clinoid region meningioma, early compressive optic neuropathy

## 2020-07-22 NOTE — Clinical Note
Familia Lowe, while a suboptimal to make decisions based upon 1 test, I do believe it is more likely than not that she has very early optic atrophy on the right side.  For this reason I thought observation is suboptimal.  I believe that she is leaning towards surgical excision with you (as opposed to radiation therapy) since it is a more definitive treatment.  I told her either approach is reasonable from my perspective in her case. I believe she was going to [place] back with you regarding a few more questions before making a final decision.  Thank you. - Mikhail

## 2020-07-22 NOTE — PROGRESS NOTES
1. Right clinioid region meningioma-this mass is unrelated to her prior myopia associated esotropia which was previously the focus of my care for this patient.  Today we did more extensive testing of her afferent visual pathways.  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  Her OCT shows mild thinning of the nerve fiber layer in the right eye as compared to the left eye.  She did not have an afferent pupillary defect on the right nor did she have evidence of significant visual field loss or visual acuity / color vision loss in the right eye.  I believe we are therefore catching compression at the earliest stages.  Together we reviewed her August 2019 MRI and her June 2020 MRI clearly showing interval growth of the lesion and also showing the impending threat to the right optic nerve as the two essentially abut one another.  We discussed the 3 options on the table for this patient (from a vision perspective) including surgical excision, targetted radiation therapy, and observation.  Based upon the findings of early optic atrophy exhibited on OCT, I do not recommend observation.  I do think radiation therapy or surgical excision are reasonable given that there is not already a tremendous amount of mass-effect.  We did discuss that in the event of radiation therapy the tumor could continue to grow and the patient may later require surgical excision.  The patient is going to consider her options further and discuss the matter again with her neurosurgeon Dr. Triana.     Follow-up 2 months after radiation therapy or surgery. Follow-up in 3 months if patient defers any treatment.     This patient who previously had followed with me for myopia associated esotropia and was doing very well after our recent strabismus surgery has come back for further evaluation focused on her afferent visual system because her most recent MRI showed significant  interval growth and a right clinoid region mass consistent with a meningioma.  We reviewed both MRIs today together.  I agree with the radiology interpretation on the most recent scan regarding interval growth.    The patient does not note any significant visual changes in regards to her right eye as compared to her left.    08/2019 MRI brain with and without contrast:  IMPRESSION: Ovoid uniformly enhancing 1.4 cm extra-axial dural-based  mass positioned along the superior aspect of the right anterior  clinoid process, most likely representing a meningioma.    06/24/20 MRI brain with and without IV contrast   IMPRESSION: Interval increase in size of extra-axial enhancing mass  arising superiorly off the anterior clinoid process. The mass, which  most likely represents a meningioma, currently measures 1.7 cm in  greatest dimensions as compared to 1.4 cm on the prior study. There is  also some new edema in the adjacent brain parenchyma.     Visual acuity today was 2020-3 in the right eye and 20/25 in the left eye (given her degree of myopia this is normal in both eyes).  Pupils were normal on my check without afferent pupillary defect.  The patient identified 11 of 11 Ishihara color plates in both eyes.  Slit lamp exam revealed mild mixed cataracts that were not visually significant in both eyes.  Dilated fundus exam showed tilted myopic appearing optic nerve heads in both eyes without clear evidence of optic nerve head pallor.    Octopus automated 30 degree visual field showed decent reliability and demonstrated mild scattered nonspecific deficits in both eyes.  My interpretation was that there was some mild testing artifact but no significant field defect in either eye. The right eye was not clearly more depressed than the left.    OCT of the optic nerve head revealed mild thinning of the right optic nerve head superiorly as compared to age-matched controls and also as compared to patient's left eye.       Complete  documentation of historical and exam elements from today's encounter can be found in the full encounter summary report (not reduplicated in this progress note).  I personally obtained the chief complaint(s) and history of present illness.  I confirmed and edited as necessary the review of systems, past medical/surgical history, family history, social history, and examination findings as documented by others; and I examined the patient myself.  I personally reviewed the relevant tests, images, and reports as documented above.  I formulated and edited as necessary the assessment and plan and discussed the findings and management plan with the patient and family     Mikhail Hernández MD

## 2020-07-22 NOTE — NURSING NOTE
Chief Complaint(s) and History of Present Illness(es)     Follow Up     In both eyes (Anterior clinoid meningioma with Myopia-associated progressive esotropia status post strabismus surgery).  Associated symptoms include Negative for double vision, eye pain and headache.              Comments     Patient states no new changes since last visit other than to discuss neuro surgery with Dr. Hernández. Reports no double vision. No headaches or eye pain. No prisms in glasses prescription.     ADRIANNA Irvin 7/22/2020 9:29 AM

## 2020-07-23 ENCOUNTER — TELEPHONE (OUTPATIENT)
Dept: NEUROSURGERY | Facility: CLINIC | Age: 65
End: 2020-07-23

## 2020-07-23 ENCOUNTER — PREP FOR PROCEDURE (OUTPATIENT)
Dept: NEUROSURGERY | Facility: CLINIC | Age: 65
End: 2020-07-23

## 2020-07-23 DIAGNOSIS — H54.61 VISION LOSS OF RIGHT EYE: ICD-10-CM

## 2020-07-23 DIAGNOSIS — D32.9 MENINGIOMA (H): Primary | ICD-10-CM

## 2020-07-23 NOTE — TELEPHONE ENCOUNTER
Spoke with patient, requesting to have procedure with Dr De Paz.  Discussed time line, plan and how this will move forward.  PAC appointment and Covid Testing discussed.  Will callback with update after Dr De Paz is made aware of procedure.  Voices understanding.

## 2020-07-23 NOTE — TELEPHONE ENCOUNTER
[facility] Call Center    Phone Message    May a detailed message be left on voicemail: no     Reason for Call: Other: Pt is trying to reach Frances who works under Dr. De Paz regarding her decision to go through surgery, which is yes. Pt is looking to speak with her care team about that process and getting scheduled. Please call Pt back.     Action Taken: Message routed to:  [facility] ([facility]): [facility]    Travel Screening: Not Applicable

## 2020-07-24 ENCOUNTER — TELEPHONE (OUTPATIENT)
Dept: NEUROSURGERY | Facility: CLINIC | Age: 65
End: 2020-07-24

## 2020-07-24 ENCOUNTER — HOSPITAL ENCOUNTER (INPATIENT)
Facility: CLINIC | Age: 65
Setting detail: SURGERY ADMIT
End: 2020-07-24
Attending: NEUROLOGICAL SURGERY | Admitting: NEUROLOGICAL SURGERY
Payer: COMMERCIAL

## 2020-07-24 DIAGNOSIS — D32.9 MENINGIOMA (H): ICD-10-CM

## 2020-07-24 DIAGNOSIS — Z11.59 ENCOUNTER FOR SCREENING FOR OTHER VIRAL DISEASES: Primary | ICD-10-CM

## 2020-07-24 DIAGNOSIS — H54.61 VISION LOSS OF RIGHT EYE: ICD-10-CM

## 2020-07-24 NOTE — TELEPHONE ENCOUNTER
Surgery Scheduled    DOS: Friday /7/2020 7:30am  Location: [facility] OR  Provider: Baldev  PAC: 7/29/2020  Patient Called: Yes, patient is aware of PAC and DOS.  No monitoring requested.  CTA Prior.    Frances-Please mail surgery packet to:    [address]  [place] [address]

## 2020-07-27 NOTE — TELEPHONE ENCOUNTER
FUTURE VISIT INFORMATION        SURGERY INFORMATION:    Date: 20    Location[facility] or    Surgeon:  Deni De Paz MD     Anesthesia Type:  general    Procedure: stealth assisted Frontotemporal craniotomy and anterior clinoidectomy for tumor with stereotactic image guidance INSERTION, DRAIN, SPINE, LUMBAR     Consult: virtual visit 7/15     RECORDS REQUESTED FROM:         Primary Care Provider: David Catherine     Most recent EKG+ Tracin/3/20

## 2020-07-29 ENCOUNTER — ANESTHESIA EVENT (OUTPATIENT)
Dept: SURGERY | Facility: CLINIC | Age: 65
End: 2020-07-29

## 2020-07-29 ENCOUNTER — PRE VISIT (OUTPATIENT)
Dept: SURGERY | Facility: CLINIC | Age: 65
End: 2020-07-29

## 2020-07-29 ENCOUNTER — OFFICE VISIT (OUTPATIENT)
Dept: NEUROSURGERY | Facility: CLINIC | Age: 65
End: 2020-07-29
Payer: COMMERCIAL

## 2020-07-29 ENCOUNTER — OFFICE VISIT (OUTPATIENT)
Dept: SURGERY | Facility: CLINIC | Age: 65
End: 2020-07-29
Payer: COMMERCIAL

## 2020-07-29 VITALS
HEART RATE: 73 BPM | RESPIRATION RATE: 16 BRPM | OXYGEN SATURATION: 98 % | SYSTOLIC BLOOD PRESSURE: 136 MMHG | DIASTOLIC BLOOD PRESSURE: 85 MMHG

## 2020-07-29 VITALS
TEMPERATURE: 98.2 F | DIASTOLIC BLOOD PRESSURE: 85 MMHG | HEIGHT: 68 IN | WEIGHT: 194 LBS | OXYGEN SATURATION: 98 % | BODY MASS INDEX: 29.4 KG/M2 | SYSTOLIC BLOOD PRESSURE: 136 MMHG | RESPIRATION RATE: 16 BRPM | HEART RATE: 73 BPM

## 2020-07-29 DIAGNOSIS — Z01.818 PREOP EXAMINATION: ICD-10-CM

## 2020-07-29 DIAGNOSIS — D32.9 MENINGIOMA (H): Primary | ICD-10-CM

## 2020-07-29 DIAGNOSIS — D32.9 MENINGIOMA (H): ICD-10-CM

## 2020-07-29 LAB
ANION GAP SERPL CALCULATED.3IONS-SCNC: 5 MMOL/L (ref 3–14)
APTT PPP: 41 SEC (ref 22–37)
BUN SERPL-MCNC: 20 MG/DL (ref 7–30)
CALCIUM SERPL-MCNC: 8.9 MG/DL (ref 8.5–10.1)
CHLORIDE SERPL-SCNC: 106 MMOL/L (ref 94–109)
CO2 SERPL-SCNC: 28 MMOL/L (ref 20–32)
CREAT SERPL-MCNC: 1.03 MG/DL (ref 0.52–1.04)
ERYTHROCYTE [DISTWIDTH] IN BLOOD BY AUTOMATED COUNT: 13.4 % (ref 10–15)
GFR SERPL CREATININE-BSD FRML MDRD: 56 ML/MIN/{1.73_M2}
GLUCOSE SERPL-MCNC: 94 MG/DL (ref 70–99)
HCT VFR BLD AUTO: 48.1 % (ref 35–47)
HGB BLD-MCNC: 15.5 G/DL (ref 11.7–15.7)
INR PPP: 0.96 (ref 0.86–1.14)
MCH RBC QN AUTO: 29.1 PG (ref 26.5–33)
MCHC RBC AUTO-ENTMCNC: 32.2 G/DL (ref 31.5–36.5)
MCV RBC AUTO: 90 FL (ref 78–100)
PLATELET # BLD AUTO: 230 10E9/L (ref 150–450)
POTASSIUM SERPL-SCNC: 4 MMOL/L (ref 3.4–5.3)
RBC # BLD AUTO: 5.33 10E12/L (ref 3.8–5.2)
SODIUM SERPL-SCNC: 138 MMOL/L (ref 133–144)
WBC # BLD AUTO: 6.3 10E9/L (ref 4–11)

## 2020-07-29 RX ORDER — LORATADINE 10 MG/1
10 TABLET ORAL PRN
COMMUNITY

## 2020-07-29 RX ORDER — ACETAMINOPHEN 325 MG/1
325-650 TABLET ORAL EVERY 6 HOURS PRN
COMMUNITY
End: 2021-10-15

## 2020-07-29 ASSESSMENT — PAIN SCALES - GENERAL
PAINLEVEL: NO PAIN (0)
PAINLEVEL: NO PAIN (0)

## 2020-07-29 ASSESSMENT — MIFFLIN-ST. JEOR: SCORE: 1463.48

## 2020-07-29 NOTE — H&P
Pre-Operative H & P     CC:  Preoperative exam to assess for increased cardiopulmonary risk while undergoing surgery and anesthesia.    Date of Encounter: 7/29/2020  Primary Care Physician:  [facility]  Reason for visit: Meningioma (H) [D32.9]  Vision loss of right eye [H54.61]    HPI  Neema Nixon is a 65 year old female who presents for pre-operative H & P in preparation for stealth assisted Frontotemporal craniotomy and anterior clinoidectomy for tumor with stereotactic image guidance, INSERTION, DRAIN, SPINE, LUMBAR with Dr. De Paz on 8/7/20 at [facility]. History is obtained from the patient and medical records.     Patient who has been recently evaluated by Dr. De Paz for incidental finding of right anterior clinoidal menigioma on MRI. She had originally presented with diplopia and underwent strabismus surgery with Dr. Hernández in 2/2020 with improvement. She has continued follow up of her meningioma with Dr. Raza with repeat MRI which revealed increased tumor size and the start of edema in the adjacent parenchyma over just a 7 month interval. She was referred to Dr. De Paz for surgical management. She was counseled for above procedure.     Her history is otherwise significant for GERD. She has history of right leg fracture in 2013 s/p surgery.     Past Medical History  Past Medical History:   Diagnosis Date     Double vision 10/23/2019     GERD (gastroesophageal reflux disease) 06/01/2010     Meningioma (H) 07/24/2020     Vision loss of right eye 07/24/2020       Past Surgical History  Past Surgical History:   Procedure Laterality Date     LEG SURGERY Right 2013    after fracture     RECESSION RESECTION WITH ADJUSTABLE SUTURE BILATERAL Bilateral 2/3/2020    Procedure: BILATERAL STRABISMUS REPAIR;  Surgeon: Mikhail Hernández MD;  Location: [facility] OR       Hx of Blood transfusions/reactions: Denies.      Hx of abnormal bleeding or  anti-platelet use: Denies.     Menstrual history: No LMP recorded. Patient is postmenopausal.    Steroid use in the last year: Denies.     Personal or FH with difficulty with Anesthesia:  Denies. Codeine causes nausea.    Prior to Admission Medications  Current Outpatient Medications   Medication Sig Dispense Refill     acetaminophen (TYLENOL) 325 MG tablet Take 325-650 mg by mouth every 6 hours as needed for mild pain       artificial tears OINT ophthalmic ointment Place into both eyes At Bedtime        cimetidine (TAGAMET) 200 MG tablet Take 200 mg by mouth as needed       cyanocobalamin (VITAMIN B-12) 1000 MCG tablet Take by mouth every morning        loratadine (CLARITIN) 10 MG tablet Take 10 mg by mouth as needed for allergies       Vitamin D, Cholecalciferol, 400 units TABS Take 1 tablet by mouth every morning          Allergies  Allergies   Allergen Reactions     Sulfa Drugs Rash       Social History  Social History     Socioeconomic History     Marital status:      Spouse name: Not on file     Number of children: Not on file     Years of education: Not on file     Highest education level: Not on file   Occupational History     Not on file   Social Needs     Financial resource strain: Not on file     Food insecurity     Worry: Not on file     Inability: Not on file     Transportation needs     Medical: Not on file     Non-medical: Not on file   Tobacco Use     Smoking status: Never Smoker     Smokeless tobacco: Never Used   Substance and Sexual Activity     Alcohol use: Yes     Comment: minor     Drug use: No     Sexual activity: Yes     Partners: Male   Lifestyle     Physical activity     Days per week: Not on file     Minutes per session: Not on file     Stress: Not on file   Relationships     Social connections     Talks on phone: Not on file     Gets together: Not on file     Attends [employer] service: Not on file     Active member of club or organization: Not on file     Attends meetings of clubs  "or organizations: Not on file     Relationship status: Not on file     Intimate partner violence     Fear of current or ex partner: Not on file     Emotionally abused: Not on file     Physically abused: Not on file     Forced sexual activity: Not on file   Other Topics Concern     Parent/sibling w/ CABG, MI or angioplasty before 65F 55M? Not Asked   Social History Narrative     Not on file       Family History  Family History   Problem Relation Age of Onset     Dementia Mother      Chronic Obstructive Pulmonary Disease Father      Glaucoma No family hx of      Macular Degeneration No family hx of        Preop Vitals    BP Readings from Last 3 Encounters:   02/03/20 130/71   09/06/19 138/80   12/26/17 142/84    Pulse Readings from Last 3 Encounters:   02/03/20 67   09/06/19 71   12/26/17 72      Resp Readings from Last 3 Encounters:   02/03/20 16   09/06/19 16    SpO2 Readings from Last 3 Encounters:   02/03/20 99%   09/06/19 98%   12/26/17 97%      Temp Readings from Last 1 Encounters:   02/03/20 98  F (36.7  C) (Temporal)    Ht Readings from Last 1 Encounters:   02/03/20 1.727 m (5' 8\")      Wt Readings from Last 1 Encounters:   02/03/20 83.9 kg (185 lb)    Estimated body mass index is 28.13 kg/m  as calculated from the following:    Height as of 2/3/20: 1.727 m (5' 8\").    Weight as of 2/3/20: 83.9 kg (185 lb).   ROS/MED HX      The complete review of systems is negative other than noted in the HPI or here.     ENT/Pulmonary:  - neg pulmonary ROS     Neurologic: Comment: Meningioma, enlarging      Cardiovascular:  - neg cardiovascular ROS   (+) Dyslipidemia, ----. : . . . :. . Previous cardiac testing date:results:date: results:ECG reviewed date:1/24/20 results:SB with occ ectopic premature complexes date: results:          METS/Exercise Tolerance:  >4 METS   Hematologic:  - neg hematologic  ROS       Musculoskeletal:  - neg musculoskeletal ROS       GI/Hepatic:     (+) GERD Other,       Renal/Genitourinary:  - " "ROS Renal section negative       Endo:  - neg endo ROS       Psychiatric:  - neg psychiatric ROS       Infectious Disease:  - neg infectious disease ROS       Malignancy:   (+) Malignancy History of Skin  Skin CA Remission status post Surgery, Basal skin CA        Other:    (+) C-spine cleared: N/A, no H/O Chronic Pain,no other significant disability   - neg other ROS         PHYSICAL EXAM:   Mental Status/Neuro: A/A/O; Age Appropriate   Airway: Facies: Feasible  Mallampati: I  Mouth/Opening: Full  TM distance: > 6 cm  Neck ROM: Full   Respiratory: Auscultation: CTAB     Resp. Rate: Normal     Resp. Effort: Normal      CV: Rhythm: Regular  Heart: Normal Sounds     Comments:      Temp: 98.2  F (36.8  C) Temp src: Oral BP: 136/85 Pulse: 73   Resp: 16 SpO2: 98 %         194 lbs 0 oz  5' 8\"[pt reported[   Body mass index is 29.5 kg/m .       Physical Exam  Constitutional: Awake, alert, cooperative, no apparent distress, and appears stated age.  Eyes: Pupils equal, round and reactive to light, extra ocular muscles intact, sclera clear, conjunctiva normal. Glasses on.  HENT: Normocephalic, oral pharynx with moist mucus membranes, good dentition. No goiter appreciated.   Respiratory: Clear to auscultation bilaterally, no crackles or wheezing. No cough or obvious dyspnea.  Cardiovascular: Regular rate and rhythm, normal S1 and S2, and no murmur noted.  Carotids +2, no bruits. Mild bilateral lower extremity edema. Palpable pulses to radial  DP and PT arteries.   GI: Normal bowel sounds, soft, non-distended, non-tender, no masses palpated, no hepatosplenomegaly.   Lymph/Hematologic: No cervical lymphadenopathy and no supraclavicular lymphadenopathy.  Genitourinary:  Deferred.   Skin: Warm and dry.   Musculoskeletal: Full ROM of neck. There is no redness, warmth, or swelling of the joints. Gross motor strength is normal.    Neurologic: Awake, alert, oriented to name, place and time. Cranial nerves II-XII are grossly intact. " Tremor noted at head. Gait is normal.   Neuropsychiatric: Calm, cooperative. Normal affect.     Labs: (personally reviewed)  Lab Results   Component Value Date    WBC 6.3 07/29/2020     Lab Results   Component Value Date    RBC 5.33 07/29/2020     Lab Results   Component Value Date    HGB 15.5 07/29/2020     Lab Results   Component Value Date    HCT 48.1 07/29/2020     Lab Results   Component Value Date    MCV 90 07/29/2020     Lab Results   Component Value Date    MCH 29.1 07/29/2020     Lab Results   Component Value Date    MCHC 32.2 07/29/2020     Lab Results   Component Value Date    RDW 13.4 07/29/2020     Lab Results   Component Value Date     07/29/2020     Last Comprehensive Metabolic Panel:  Sodium   Date Value Ref Range Status   07/29/2020 138 133 - 144 mmol/L Final     Potassium   Date Value Ref Range Status   07/29/2020 4.0 3.4 - 5.3 mmol/L Final     Chloride   Date Value Ref Range Status   07/29/2020 106 94 - 109 mmol/L Final     Carbon Dioxide   Date Value Ref Range Status   07/29/2020 28 20 - 32 mmol/L Final     Anion Gap   Date Value Ref Range Status   07/29/2020 5 3 - 14 mmol/L Final     Glucose   Date Value Ref Range Status   07/29/2020 94 70 - 99 mg/dL Final     Urea Nitrogen   Date Value Ref Range Status   07/29/2020 20 7 - 30 mg/dL Final     Creatinine   Date Value Ref Range Status   07/29/2020 1.03 0.52 - 1.04 mg/dL Final     GFR Estimate   Date Value Ref Range Status   07/29/2020 56 (L) >60 mL/min/[1.73_m2] Final     Comment:     Non  GFR Calc  Starting 12/18/2018, serum creatinine based estimated GFR (eGFR) will be   calculated using the Chronic Kidney Disease Epidemiology Collaboration   (CKD-EPI) equation.       Calcium   Date Value Ref Range Status   07/29/2020 8.9 8.5 - 10.1 mg/dL Final   INR 0.96  PTT 41  EKG: Personally reviewed 1/24/20 Sinus bradycardia with occasional ectopic premature complexes    MRI 6/24/20   IMPRESSION: Interval increase in size of  extra-axial enhancing mass  arising superiorly off the anterior clinoid process. The mass, which  most likely represents a meningioma, currently measures 1.7 cm in greatest dimensions as compared to 1.4 cm on the prior study. There is lso some new edema in the adjacent brain parenchyma.      [Access Center: Increasing size of meningioma with new brain edema.]    Imaging reviewed by this provider      Outside records reviewed from: Care Everywhere    ASSESSMENT and PLAN  Neema Nixon is a 65 year old female scheduled to undergo  stealth assisted Frontotemporal craniotomy and anterior clinoidectomy for tumor with stereotactic image guidance, INSERTION, DRAIN, SPINE, LUMBAR with Dr. De Paz on 8/7/20. She has the following specific operative considerations:   - RCRI : No serious cardiac risks.    - Anesthesia considerations:  Refer to PAC assessment in anesthesia records  - VTE risk: 0.5%  - STANLEY # of risks 1/8 = Low risk  - Risk of PONV score = 3.  If > 2, anti-emetic intervention recommended. If 3 or > anti emetic intervention recommended with two or more meds    --Enlarging meningioma with above procedure now planned.   --Generally healthy with no significant cardiopulmonary history. Nonsmoker. Good activity tolerance.   --GERD, intermittent with use of Gaviscon and Tagamet prn. Encouraged her to use meds to keep reflux stable in days leading up to surgery.   --History of right leg fracture with surgery in 2013. Right leg is still sensitive to touch. Will require careful positioning.   --Type and screen drawn today    Arrival time, NPO, shower and medication instructions provided by nursing staff today. Preparing For Your Surgery handout given.    Patient was reviewed by Dr Brower.    MEKA White CNS  Preoperative Assessment Center  [facility]  [facility] Center  Phone: [phone]  Fax: [phone]

## 2020-07-29 NOTE — ANESTHESIA PREPROCEDURE EVALUATION
"Anesthesia Pre-Procedure Evaluation    Patient: Neema Nixon   MRN:     [id number] Gender:   female   Age:    65 year old :      1955        Preoperative Diagnosis: Meningioma (H) [D32.9]  Vision loss of right eye [H54.61]   Procedure(s):  stealth assisted Frontotemporal craniotomy and anterior clinoidectomy for tumor with stereotactic image guidance  INSERTION, DRAIN, SPINE, LUMBAR     LABS:  CBC: No results found for: WBC, HGB, HCT, PLT  BMP: No results found for: NA, POTASSIUM, CHLORIDE, CO2, BUN, CR, GLC  COAGS: No results found for: PTT, INR, FIBR  POC: No results found for: BGM, HCG, HCGS  OTHER: No results found for: PH, LACT, A1C, GINO, PHOS, MAG, ALBUMIN, PROTTOTAL, ALT, AST, GGT, ALKPHOS, BILITOTAL, BILIDIRECT, LIPASE, AMYLASE, RUSS, TSH, T4, T3, CRP, SED     Preop Vitals    BP Readings from Last 3 Encounters:   20 130/71   19 138/80   17 142/84    Pulse Readings from Last 3 Encounters:   20 67   19 71   17 72      Resp Readings from Last 3 Encounters:   20 16   19 16    SpO2 Readings from Last 3 Encounters:   20 99%   19 98%   17 97%      Temp Readings from Last 1 Encounters:   20 98  F (36.7  C) (Temporal)    Ht Readings from Last 1 Encounters:   20 1.727 m (5' 8\")      Wt Readings from Last 1 Encounters:   20 83.9 kg (185 lb)    Estimated body mass index is 28.13 kg/m  as calculated from the following:    Height as of 2/3/20: 1.727 m (5' 8\").    Weight as of 2/3/20: 83.9 kg (185 lb).     LDA:        Past Medical History:   Diagnosis Date     Double vision 10/23/2019     GERD (gastroesophageal reflux disease) 2010     Meningioma (H) 2020     Vision loss of right eye 2020      Past Surgical History:   Procedure Laterality Date     RECESSION RESECTION WITH ADJUSTABLE SUTURE BILATERAL Bilateral 2/3/2020    Procedure: BILATERAL STRABISMUS REPAIR;  Surgeon: Mikhail Hernández MD;  Location: " [facility] OR      Allergies   Allergen Reactions     Sulfa Drugs Rash        Anesthesia Evaluation     . Pt has had prior anesthetic. Type: General    No history of anesthetic complications          ROS/MED HX    ENT/Pulmonary:  - neg pulmonary ROS     Neurologic: Comment: Meningioma, enlarging    (+)other neuro tremor-head    Cardiovascular: Comment: Work up 8 years ago for palpitations-neg    (+) Dyslipidemia, ----. : . . . :. . Previous cardiac testing date:results:date: results:ECG reviewed date:1/24/20 results:SB with occ ectopic premature complexes date: results:         (-) taking anticoagulants/antiplatelets   METS/Exercise Tolerance:  >4 METS   Hematologic:  - neg hematologic  ROS       Musculoskeletal: Comment: History right leg fracture with surgery 2013        GI/Hepatic: Comment: Intermittent. Uses Gaviscon but will also use Tagamet prn    (+) GERD Other,       Renal/Genitourinary:  - ROS Renal section negative       Endo:  - neg endo ROS       Psychiatric:  - neg psychiatric ROS       Infectious Disease:  - neg infectious disease ROS       Malignancy:   (+) Malignancy History of Skin  Skin CA Remission status post Surgery, Basal skin CA        Other:    (+) C-spine cleared: N/A, no H/O Chronic Pain,no other significant disability   - neg other ROS                     PHYSICAL EXAM:   Mental Status/Neuro: A/A/O; Age Appropriate   Airway: Facies: Feasible  Mallampati: I  Mouth/Opening: Full  TM distance: > 6 cm  Neck ROM: Full   Respiratory: Auscultation: CTAB     Resp. Rate: Normal     Resp. Effort: Normal      CV: Rhythm: Regular  Heart: Normal Sounds  Edema: LLE; RLE   Comments:      Dental: Normal Dentition                Assessment:   ASA SCORE: 2    H&P: History and physical reviewed and following examination; no interval change.   Smoking Status:  Non-Smoker/Unknown   NPO Status: NPO Appropriate     Plan:   Anes. Type:  General   Pre-Medication: None   Induction:  IV (Standard)   Airway: ETT; Oral    Access/Monitoring: PIV; 2nd PIV; A-Line   Maintenance: Balanced     Postop Plan:   Postop Pain: Opioids  Postop Sedation/Airway: Not planned  Disposition: Inpatient/Admit     PONV Management:   Adult Risk Factors: Female, Non-Smoker, Postop Opioids   Prevention: Ondansetron, Dexamethasone     CONSENT: Direct conversation   Plan and risks discussed with: Patient   Blood Products: Consent Deferred (Minimal Blood Loss)                PAC Discussion and Assessment    ASA Classification: 3  Case is suitable for: [place]  Anesthetic techniques and relevant risks discussed: GA  Invasive monitoring and risk discussed: No  Types:   Possibility and Risk of blood transfusion discussed: Yes  NPO instructions given:   Additional anesthetic preparation and risks discussed:   Needs early admission to pre-op area:   Other:     PAC Resident/NP Anesthesia Assessment:  Neema Nixon is a 65 year old female scheduled to undergo  stealth assisted Frontotemporal craniotomy and anterior clinoidectomy for tumor with stereotactic image guidance, INSERTION, DRAIN, SPINE, LUMBAR with Dr. De Paz on 8/7/20. She has the following specific operative considerations:   - RCRI : No serious cardiac risks.    - VTE risk: 0.5%  - STANLEY # of risks 1/8 = Low risk  - Risk of PONV score = 3.  If > 2, anti-emetic intervention recommended. If 3 or > anti emetic intervention recommended with two or more meds    --Enlarging meningioma with above procedure now planned.   --Generally healthy with no significant cardiopulmonary history. Nonsmoker. Good activity tolerance.   --GERD, intermittent with use of Gaviscon and Tagamet prn. Encouraged her to use meds to keep reflux stable in days leading up to surgery.   --History of right leg fracture with surgery in 2013. Right leg is still sensitive to touch. Will require careful positioning.   --Type and screen drawn today      Patient was reviewed by Dr Brower.      Reviewed and Signed by PAC Mid-Level  Provider/Resident  Mid-Level Provider/Resident: MEKA Bocanegra CNS  Date: 7/29/20  Time: 8:45am    Attending Anesthesiologist Anesthesia Assessment:        Anesthesiologist:   Date:   Time:   Pass/Fail:   Disposition:     PAC Pharmacist Assessment:        Pharmacist:   Date:   Time:    MEKA White

## 2020-07-29 NOTE — PROGRESS NOTES
[facility]      Name: Neema Nixon  MRN: [id number]  Age: 65 year old  : 1955  Referring provider: Referred Self  2020      Chief Complaint:   Follow up, meningioma; surgery discussion    History of Present Illness:   Neema Nixon is a pleasant 65 year old female with a history of GERD, vision loss in her right eye and diplopia, and right clinoid meningioma, who is seen in the [facility]  today for pre-operative discussion regarding resection of her growing right clinoid meningioma.  She is scheduled for stealth assisted frontotemporal craniotomy and anterior clinoidectomy for tumor with stereotactic image guidance and lumbar drain placement on 2020 with Dr. De Paz.   She initially presented with diplopia and was found with an incidental right anterior clinoidal meningioma on MRI. For her diplopia, she underwent strabismus surgery with Dr. Hernández and had good improvement. She was followed for her meningioma by Dr. Blake Raza, who repeated the MRI which showed increased tumor size and the start of edema in the adjacent parenchyma over just a 7 month interval. She was seen on 7/15/2020 by Dr. De Paz who thoroughly discussed options and she ultimately decided to have surgery. She feels well today. Her vision remains good, no new blurry or double vision. She saw Dr. Hernández last week. No new fever, chills, cough, dyspnea, headaches, vision or hearing changes, paresthesias, weakness, or vertigo.     She spends her summers in [facility] [place]. The other months of the year she lives near [place], [place].  Her brother is in the [facility] and she plans to stay with him for a few days post discharge before driving north.    Review of Systems:   Pertinent items are noted in HPI or as in patient entered ROS below, remainder of complete ROS is negative.   No flowsheet data found.     Physical Exam:   BP  136/85   Pulse 73   Resp 16   SpO2 98%   General: No acute distress.   Head: No signs of trauma.    Eyes: Conjunctivae are normal.  Mouth/Throat: Oropharynx moist.  Neck: Normal range of motion.    Resp: No respiratory distress.   MSK: Moves all extremities.  No obvious deformity.  Neuro: The patient is fully oriented and quite pleasant. Speech normal. Extraocular movements are intact without nystagmus. Facial sensation is intact in V1, V2, V3 distributions. Palate is symmetric. Shoulders are full strength. Tongue is midline. There is no pronator drift. Full strength in all extremities. Sensation intact throughout. No dysmetria with finger-nose-finger testing. Gait has is normal.   Psych: Normal mood and affect. Behavior is normal.      Imaging:  We reviewed the imaging again and discussed findings of growing right clinoid meningioma with edema noted.     Assessment:  Growing right clinoid meningioma, new edema    Plan:  We discussed the risks of her upcoming surgery including bleeding, infection, neurologic injury, injury to the carotid, optic nerve injury, stroke, CSF leak, loss or changes in smell and taste, weakness, numbness, seizure, coma, death. We discussed placement of a lumbar drain will be done and the details surrounding this. Hospital stay and post operative plan were discussed. Pre surgery plan as follows:    1. CTA with fiducial placement the morning of surgery, this is scheduled.  2. PAC appointment - she has had anesthesia in the past and done well.  3. Covid testing.  4. She was encouraged to call us with any questions or new concerns in the interim. She was given a surgery packet today.    Dr. De Paz did meet with the patient as well today and discussed the procedure in detail, all questions were answered.     Margarita Mooney PA-C

## 2020-07-29 NOTE — LETTER
2020       RE: Neema Nixon  [address]  [place] [address]     Dear Colleague,    Thank you for referring your patient, Neema Nixon, to the [facility] at [facility]. Please see a copy of my visit note below.      [facility]      Name: Neema Nixon  MRN: [id number]  Age: 65 year old  : 1955  Referring provider: Referred Self  2020      Chief Complaint:   Follow up, meningioma; surgery discussion    History of Present Illness:   Neema Nixon is a pleasant 65 year old female with a history of GERD, vision loss in her right eye and diplopia, and right clinoid meningioma, who is seen in the [facility]  today for pre-operative discussion regarding resection of her growing right clinoid meningioma.  She is scheduled for stealth assisted frontotemporal craniotomy and anterior clinoidectomy for tumor with stereotactic image guidance and lumbar drain placement on 2020 with Dr. De Paz.   She initially presented with diplopia and was found with an incidental right anterior clinoidal meningioma on MRI. For her diplopia, she underwent strabismus surgery with Dr. Hernández and had good improvement. She was followed for her meningioma by Dr. Blake Raza, who repeated the MRI which showed increased tumor size and the start of edema in the adjacent parenchyma over just a 7 month interval. She was seen on 7/15/2020 by Dr. De Paz who thoroughly discussed options and she ultimately decided to have surgery. She feels well today. Her vision remains good, no new blurry or double vision. She saw Dr. Hernández last week. No new fever, chills, cough, dyspnea, headaches, vision or hearing changes, paresthesias, weakness, or vertigo.     She spends her summers in [facility] [place]. The other months of the year she lives near [place], [place].  Her brother is in  the [facility] and she plans to stay with him for a few days post discharge before driving north.    Review of Systems:   Pertinent items are noted in HPI or as in patient entered ROS below, remainder of complete ROS is negative.   No flowsheet data found.     Physical Exam:   /85   Pulse 73   Resp 16   SpO2 98%   General: No acute distress.   Head: No signs of trauma.    Eyes: Conjunctivae are normal.  Mouth/Throat: Oropharynx moist.  Neck: Normal range of motion.    Resp: No respiratory distress.   MSK: Moves all extremities.  No obvious deformity.  Neuro: The patient is fully oriented and quite pleasant. Speech normal. Extraocular movements are intact without nystagmus. Facial sensation is intact in V1, V2, V3 distributions. Palate is symmetric. Shoulders are full strength. Tongue is midline. There is no pronator drift. Full strength in all extremities. Sensation intact throughout. No dysmetria with finger-nose-finger testing. Gait has is normal.   Psych: Normal mood and affect. Behavior is normal.      Imaging:  We reviewed the imaging again and discussed findings of growing right clinoid meningioma with edema noted.     Assessment:  Growing right clinoid meningioma, new edema    Plan:  We discussed the risks of her upcoming surgery including bleeding, infection, neurologic injury, injury to the carotid, optic nerve injury, stroke, CSF leak, loss or changes in smell and taste, weakness, numbness, seizure, coma, death. We discussed placement of a lumbar drain will be done and the details surrounding this. Hospital stay and post operative plan were discussed. Pre surgery plan as follows:    1. CTA with fiducial placement the morning of surgery, this is scheduled.  2. PAC appointment - she has had anesthesia in the past and done well.  3. Covid testing.  4. She was encouraged to call us with any questions or new concerns in the interim. She was given a surgery packet today.    Dr. De Paz did meet with  the patient as well today and discussed the procedure in detail, all questions were answered.    Margarita Mooney PA-C

## 2020-07-29 NOTE — PATIENT INSTRUCTIONS
Preparing for Your Surgery      Name:  Neema Nixon   MRN:  [id number]   :  1955   Today's Date:  2020       Arriving for surgery:  Surgery date:  20  Arrival time:  05:30 am    Restrictions due to COVID 19:  Patients are allowed one visitor in the pre-op period  All visitors must wear a mask  No visitors under 18  No ill visitors   parking is not available     Please come to:    [facility]   [address] [place]  [address]  -    Please proceed to the Surgery Lounge on the 3rd floor. [phone]?     - ?If you are in need of directions, wheelchair or escort please stop at the Information Desk in the lobby.  Inform the information person that you are here for surgery; a wheelchair and escort will be provided to the Surgery Lounge .?     What can I eat or drink?  -  You may eat and drink normally for up to 8 hours before your surgery.   -  You may have clear liquids until 2 hours before surgery.  Examples of clear liquids:  Water  Clear broth  Juices (apple, white grape, white cranberry  and cider) without pulp  Noncarbonated, powder based beverages  (lemonade and Alcon-Aid)  Sodas (Sprite, 7-Up, ginger ale and seltzer)  Coffee or tea (without milk or cream)  Gatorade    -  No Alcohol for at least 24 hours before surgery     Which medicines can I take?    Hold Aspirin for 7 days before surgery.   Hold Multivitamins for 7 days before surgery.  Hold Supplements for 7 days before surgery.  Hold Ibuprofen (Advil, Motrin) for 1 day before surgery--unless otherwise directed by surgeon.  Hold Naproxen (Aleve) for 4 days before surgery.  -  PLEASE TAKE these medications the day of surgery:  Tylenol if needed; take scheduled medications normally taken in the morning.    How do I prepare myself?  - Please take 2 showers before surgery using Scrubcare or Hibiclens soap.    Use this soap only from the neck to your toes.     Leave the soap on your skin for  one minute--then rinse thoroughly.      You may use your own shampoo and conditioner; no other hair products.   - Please remove all jewelry and body piercings.  - No lotions, deodorants or fragrance.  - No makeup or fingernail polish.   - Bring your ID and insurance card.    - All patients are required to have a Covid-19 test within 4 days of surgery/procedure.      -Patients will be contacted by the [facility] scheduling team within 1 week of surgery to make an appointment.      - Patients may call the Scheduling team at [phone] if they have not been scheduled within 4 days of  surgery.     Questions or Concerns:    - For any questions regarding the day of surgery or your hospital stay, please contact the Pre Admission Nursing Office at [phone].       - If you have health changes between today and your surgery please call your surgeon.       For questions after surgery please call your surgeons office.

## 2020-07-29 NOTE — NURSING NOTE
Pre-op Teaching    Procedure: 8/7/20 stealth assisted Frontotemporal craniotomy and anterior clinoidectomy for tumor with stereotactic image guidance (Right Head) INSERTION, DRAIN, SPINE, LUMBAR   Planned Surgery Date:8/7/20  Surgeon:Deni De Paz MD                Discussed pre-op routine and requirements to include:  surgical procedure, post-op recovery and expectations, need for H&P, NPO prior to OR, pre-op antibacterial showers, pain control and importance of follow-up visits.  Surgery scheduling will coordinate OR time/date and update patient as appropriate.  3C will call to re-inforce instructions 24-48 hours prior to surgery.       Ample time was provided for patient questions and in-depth discussion of topics of heightened interest.  Reviewed medication list and provided instructions regarding what medications to stop prior to surgery.   Anti-bacterial soap solution for pre-op showers will be provided at PAC visit as well as specific instructions for use. Approximately 20 minutes spent with patient/family discussing and reviewing.      Patient provided triage contact number for questions or concerns that may arise prior to surgery. Pre-op folder with specific written instructions given to patient for review.  Patient teach back method completed.

## 2020-08-03 DIAGNOSIS — Z11.59 ENCOUNTER FOR SCREENING FOR OTHER VIRAL DISEASES: ICD-10-CM

## 2020-08-03 PROCEDURE — U0003 INFECTIOUS AGENT DETECTION BY NUCLEIC ACID (DNA OR RNA); SEVERE ACUTE RESPIRATORY SYNDROME CORONAVIRUS 2 (SARS-COV-2) (CORONAVIRUS DISEASE [COVID-19]), AMPLIFIED PROBE TECHNIQUE, MAKING USE OF HIGH THROUGHPUT TECHNOLOGIES AS DESCRIBED BY CMS-2020-01-R: HCPCS | Performed by: NEUROLOGICAL SURGERY

## 2020-08-04 LAB
SARS-COV-2 RNA SPEC QL NAA+PROBE: NOT DETECTED
SPECIMEN SOURCE: NORMAL

## 2020-08-05 RX ORDER — CEFAZOLIN SODIUM 1 G/3ML
1 INJECTION, POWDER, FOR SOLUTION INTRAMUSCULAR; INTRAVENOUS SEE ADMIN INSTRUCTIONS
Status: CANCELLED | OUTPATIENT
Start: 2020-08-05

## 2020-08-05 RX ORDER — CEFAZOLIN SODIUM 2 G/100ML
2 INJECTION, SOLUTION INTRAVENOUS
Status: CANCELLED | OUTPATIENT
Start: 2020-08-05

## 2020-08-07 ENCOUNTER — ANESTHESIA (OUTPATIENT)
Dept: SURGERY | Facility: CLINIC | Age: 65
End: 2020-08-07

## 2020-08-07 ENCOUNTER — TELEPHONE (OUTPATIENT)
Dept: NEUROSURGERY | Facility: CLINIC | Age: 65
End: 2020-08-07

## 2020-08-07 ENCOUNTER — PREP FOR PROCEDURE (OUTPATIENT)
Dept: NEUROSURGERY | Facility: CLINIC | Age: 65
End: 2020-08-07

## 2020-08-07 DIAGNOSIS — D32.9 MENINGIOMA (H): Primary | ICD-10-CM

## 2020-08-07 DIAGNOSIS — Z11.59 ENCOUNTER FOR SCREENING FOR OTHER VIRAL DISEASES: Primary | ICD-10-CM

## 2020-08-07 NOTE — TELEPHONE ENCOUNTER
Surgery Rescheduled    Surgery on 8/7/2020 cancelled via provider.    DOS: 8/20/2020  Location: [facility] OR  Provider: Dr. De Paz  PAC: Complete  Patient Called: Yes, patient aware of new date.

## 2020-08-10 ENCOUNTER — TELEPHONE (OUTPATIENT)
Dept: NEUROSURGERY | Facility: CLINIC | Age: 65
End: 2020-08-10

## 2020-08-10 NOTE — TELEPHONE ENCOUNTER
Patient rescheduled- patient is aware.    Neema Nixon   MRN: [id number]   Procedure(s):   stealth assisted Frontotemporal craniotomy for tumor with stereotactic image guidance   INSERTION, DRAIN, SPINE, LUMBAR   Date: 8/20/2020   Time: 0730   Location:  OR

## 2020-08-17 DIAGNOSIS — Z11.59 ENCOUNTER FOR SCREENING FOR OTHER VIRAL DISEASES: ICD-10-CM

## 2020-08-17 PROCEDURE — U0003 INFECTIOUS AGENT DETECTION BY NUCLEIC ACID (DNA OR RNA); SEVERE ACUTE RESPIRATORY SYNDROME CORONAVIRUS 2 (SARS-COV-2) (CORONAVIRUS DISEASE [COVID-19]), AMPLIFIED PROBE TECHNIQUE, MAKING USE OF HIGH THROUGHPUT TECHNOLOGIES AS DESCRIBED BY CMS-2020-01-R: HCPCS | Performed by: NEUROLOGICAL SURGERY

## 2020-08-18 LAB
SARS-COV-2 RNA SPEC QL NAA+PROBE: NOT DETECTED
SPECIMEN SOURCE: NORMAL

## 2020-08-18 ASSESSMENT — ACTIVITIES OF DAILY LIVING (ADL): PRIOR_FUNCTIONAL_LEVEL_COMMENT: INDEPENDENT

## 2020-08-19 ENCOUNTER — ANESTHESIA EVENT (OUTPATIENT)
Dept: SURGERY | Facility: CLINIC | Age: 65
DRG: 025 | End: 2020-08-19
Payer: COMMERCIAL

## 2020-08-20 ENCOUNTER — ANESTHESIA (OUTPATIENT)
Dept: SURGERY | Facility: CLINIC | Age: 65
DRG: 025 | End: 2020-08-20
Payer: COMMERCIAL

## 2020-08-20 ENCOUNTER — HOSPITAL ENCOUNTER (OUTPATIENT)
Dept: CT IMAGING | Facility: CLINIC | Age: 65
DRG: 025 | End: 2020-08-20
Attending: NEUROLOGICAL SURGERY | Admitting: NEUROLOGICAL SURGERY
Payer: COMMERCIAL

## 2020-08-20 ENCOUNTER — HOSPITAL ENCOUNTER (INPATIENT)
Facility: CLINIC | Age: 65
LOS: 2 days | Discharge: HOME | DRG: 025 | End: 2020-08-22
Attending: NEUROLOGICAL SURGERY | Admitting: NEUROLOGICAL SURGERY
Payer: COMMERCIAL

## 2020-08-20 DIAGNOSIS — D32.9 MENINGIOMA (H): Primary | ICD-10-CM

## 2020-08-20 DIAGNOSIS — D32.9 MENINGIOMA (H): ICD-10-CM

## 2020-08-20 LAB
ABO + RH BLD: NORMAL
ABO + RH BLD: NORMAL
ANION GAP SERPL CALCULATED.3IONS-SCNC: 6 MMOL/L (ref 3–14)
APTT PPP: 41 SEC (ref 22–37)
BLD GP AB SCN SERPL QL: NORMAL
BLOOD BANK CMNT PATIENT-IMP: NORMAL
BLOOD BANK CMNT PATIENT-IMP: NORMAL
BUN SERPL-MCNC: 17 MG/DL (ref 7–30)
CALCIUM SERPL-MCNC: 9.3 MG/DL (ref 8.5–10.1)
CHLORIDE SERPL-SCNC: 107 MMOL/L (ref 94–109)
CO2 SERPL-SCNC: 26 MMOL/L (ref 20–32)
CREAT SERPL-MCNC: 1.07 MG/DL (ref 0.52–1.04)
ERYTHROCYTE [DISTWIDTH] IN BLOOD BY AUTOMATED COUNT: 13.2 % (ref 10–15)
GFR SERPL CREATININE-BSD FRML MDRD: 54 ML/MIN/{1.73_M2}
GLUCOSE BLDC GLUCOMTR-MCNC: 97 MG/DL (ref 70–99)
GLUCOSE SERPL-MCNC: 96 MG/DL (ref 70–99)
HCT VFR BLD AUTO: 47.6 % (ref 35–47)
HGB BLD-MCNC: 15.2 G/DL (ref 11.7–15.7)
INR PPP: 1.03 (ref 0.86–1.14)
MCH RBC QN AUTO: 28.7 PG (ref 26.5–33)
MCHC RBC AUTO-ENTMCNC: 31.9 G/DL (ref 31.5–36.5)
MCV RBC AUTO: 90 FL (ref 78–100)
OSMOLALITY SERPL: 306 MMOL/KG (ref 280–301)
PLATELET # BLD AUTO: 255 10E9/L (ref 150–450)
POTASSIUM SERPL-SCNC: 3.9 MMOL/L (ref 3.4–5.3)
RBC # BLD AUTO: 5.29 10E12/L (ref 3.8–5.2)
SODIUM BLD-SCNC: 140 MMOL/L (ref 133–144)
SODIUM SERPL-SCNC: 139 MMOL/L (ref 133–144)
SPECIMEN EXP DATE BLD: NORMAL
WBC # BLD AUTO: 6.2 10E9/L (ref 4–11)

## 2020-08-20 PROCEDURE — 25800030 ZZH RX IP 258 OP 636: Performed by: NURSE ANESTHETIST, CERTIFIED REGISTERED

## 2020-08-20 PROCEDURE — 37000008 ZZH ANESTHESIA TECHNICAL FEE, 1ST 30 MIN: Performed by: NEUROLOGICAL SURGERY

## 2020-08-20 PROCEDURE — 27810169 ZZH OR IMPLANT GENERAL: Performed by: NEUROLOGICAL SURGERY

## 2020-08-20 PROCEDURE — 27211024 ZZHC OR SUPPLY OTHER OPNP: Performed by: NEUROLOGICAL SURGERY

## 2020-08-20 PROCEDURE — 25000125 ZZHC RX 250: Performed by: STUDENT IN AN ORGANIZED HEALTH CARE EDUCATION/TRAINING PROGRAM

## 2020-08-20 PROCEDURE — 25000125 ZZHC RX 250: Performed by: NEUROLOGICAL SURGERY

## 2020-08-20 PROCEDURE — C1762 CONN TISS, HUMAN(INC FASCIA): HCPCS | Performed by: NEUROLOGICAL SURGERY

## 2020-08-20 PROCEDURE — 25000125 ZZHC RX 250: Performed by: NURSE ANESTHETIST, CERTIFIED REGISTERED

## 2020-08-20 PROCEDURE — 27211180 CTA HEAD WITH CONTRAST

## 2020-08-20 PROCEDURE — 88331 PATH CONSLTJ SURG 1 BLK 1SPC: CPT | Performed by: NEUROLOGICAL SURGERY

## 2020-08-20 PROCEDURE — 27210794 ZZH OR GENERAL SUPPLY STERILE: Performed by: NEUROLOGICAL SURGERY

## 2020-08-20 PROCEDURE — 36000074 ZZH SURGERY LEVEL 6 1ST 30 MIN - UMMC: Performed by: NEUROLOGICAL SURGERY

## 2020-08-20 PROCEDURE — 37000009 ZZH ANESTHESIA TECHNICAL FEE, EACH ADDTL 15 MIN: Performed by: NEUROLOGICAL SURGERY

## 2020-08-20 PROCEDURE — 83930 ASSAY OF BLOOD OSMOLALITY: CPT | Performed by: NEUROLOGICAL SURGERY

## 2020-08-20 PROCEDURE — 25000566 ZZH SEVOFLURANE, EA 15 MIN: Performed by: NEUROLOGICAL SURGERY

## 2020-08-20 PROCEDURE — 40000170 ZZH STATISTIC PRE-PROCEDURE ASSESSMENT II: Performed by: NEUROLOGICAL SURGERY

## 2020-08-20 PROCEDURE — 25000128 H RX IP 250 OP 636: Performed by: STUDENT IN AN ORGANIZED HEALTH CARE EDUCATION/TRAINING PROGRAM

## 2020-08-20 PROCEDURE — C1713 ANCHOR/SCREW BN/BN,TIS/BN: HCPCS | Performed by: NEUROLOGICAL SURGERY

## 2020-08-20 PROCEDURE — 36000076 ZZH SURGERY LEVEL 6 EA 15 ADDTL MIN - UMMC: Performed by: NEUROLOGICAL SURGERY

## 2020-08-20 PROCEDURE — 40000275 ZZH STATISTIC RCP TIME EA 10 MIN

## 2020-08-20 PROCEDURE — 25000128 H RX IP 250 OP 636: Performed by: NEUROLOGICAL SURGERY

## 2020-08-20 PROCEDURE — 70450 CT HEAD/BRAIN W/O DYE: CPT

## 2020-08-20 PROCEDURE — P9041 ALBUMIN (HUMAN),5%, 50ML: HCPCS | Performed by: NURSE ANESTHETIST, CERTIFIED REGISTERED

## 2020-08-20 PROCEDURE — 8E09XBG COMPUTER ASSISTED PROCEDURE OF HEAD AND NECK REGION, WITH COMPUTERIZED TOMOGRAPHY: ICD-10-PCS | Performed by: FAMILY MEDICINE

## 2020-08-20 PROCEDURE — 36415 COLL VENOUS BLD VENIPUNCTURE: CPT | Performed by: STUDENT IN AN ORGANIZED HEALTH CARE EDUCATION/TRAINING PROGRAM

## 2020-08-20 PROCEDURE — 25800030 ZZH RX IP 258 OP 636: Performed by: NEUROLOGICAL SURGERY

## 2020-08-20 PROCEDURE — 27210995 ZZH RX 272: Performed by: NEUROLOGICAL SURGERY

## 2020-08-20 PROCEDURE — 88307 TISSUE EXAM BY PATHOLOGIST: CPT | Performed by: NEUROLOGICAL SURGERY

## 2020-08-20 PROCEDURE — 71000017 ZZH RECOVERY PHASE 1 LEVEL 3 EA ADDTL HR: Performed by: NEUROLOGICAL SURGERY

## 2020-08-20 PROCEDURE — 20000004 ZZH R&B ICU UMMC

## 2020-08-20 PROCEDURE — 85730 THROMBOPLASTIN TIME PARTIAL: CPT | Performed by: STUDENT IN AN ORGANIZED HEALTH CARE EDUCATION/TRAINING PROGRAM

## 2020-08-20 PROCEDURE — 84295 ASSAY OF SERUM SODIUM: CPT | Performed by: NEUROLOGICAL SURGERY

## 2020-08-20 PROCEDURE — 85027 COMPLETE CBC AUTOMATED: CPT | Performed by: STUDENT IN AN ORGANIZED HEALTH CARE EDUCATION/TRAINING PROGRAM

## 2020-08-20 PROCEDURE — 71000016 ZZH RECOVERY PHASE 1 LEVEL 3 FIRST HR: Performed by: NEUROLOGICAL SURGERY

## 2020-08-20 PROCEDURE — 85610 PROTHROMBIN TIME: CPT | Performed by: STUDENT IN AN ORGANIZED HEALTH CARE EDUCATION/TRAINING PROGRAM

## 2020-08-20 PROCEDURE — 25800030 ZZH RX IP 258 OP 636: Performed by: STUDENT IN AN ORGANIZED HEALTH CARE EDUCATION/TRAINING PROGRAM

## 2020-08-20 PROCEDURE — 00B10ZZ EXCISION OF CEREBRAL MENINGES, OPEN APPROACH: ICD-10-PCS | Performed by: FAMILY MEDICINE

## 2020-08-20 PROCEDURE — 40000014 ZZH STATISTIC ARTERIAL MONITORING DAILY

## 2020-08-20 PROCEDURE — 00000146 ZZHCL STATISTIC GLUCOSE BY METER IP

## 2020-08-20 PROCEDURE — 25000128 H RX IP 250 OP 636: Performed by: NURSE ANESTHETIST, CERTIFIED REGISTERED

## 2020-08-20 PROCEDURE — 25000128 H RX IP 250 OP 636: Performed by: ANESTHESIOLOGY

## 2020-08-20 PROCEDURE — 25000132 ZZH RX MED GY IP 250 OP 250 PS 637: Performed by: STUDENT IN AN ORGANIZED HEALTH CARE EDUCATION/TRAINING PROGRAM

## 2020-08-20 PROCEDURE — 80048 BASIC METABOLIC PNL TOTAL CA: CPT | Performed by: STUDENT IN AN ORGANIZED HEALTH CARE EDUCATION/TRAINING PROGRAM

## 2020-08-20 PROCEDURE — 00U20KZ SUPPLEMENT DURA MATER WITH NONAUTOLOGOUS TISSUE SUBSTITUTE, OPEN APPROACH: ICD-10-PCS | Performed by: FAMILY MEDICINE

## 2020-08-20 DEVICE — IMP SCR SYN MATRIX LOW PRO 1.5X04MM SELF DRILL 04.503.104.01: Type: IMPLANTABLE DEVICE | Site: SKULL | Status: FUNCTIONAL

## 2020-08-20 DEVICE — IMP BUR HOLE COVER 17MM LOW PROFILE TI 421.527: Type: IMPLANTABLE DEVICE | Site: SKULL | Status: FUNCTIONAL

## 2020-08-20 DEVICE — IMP PLATE SYN STR DOG BONE LOW PROFILE 2H TI 421.502: Type: IMPLANTABLE DEVICE | Site: SKULL | Status: FUNCTIONAL

## 2020-08-20 DEVICE — GRAFT DUREPAIR DURA MATRIX 12.5X10CM (4X5") 62110: Type: IMPLANTABLE DEVICE | Site: BRAIN | Status: FUNCTIONAL

## 2020-08-20 RX ORDER — ONDANSETRON 4 MG/1
4 TABLET, ORALLY DISINTEGRATING ORAL EVERY 30 MIN PRN
Status: DISCONTINUED | OUTPATIENT
Start: 2020-08-20 | End: 2020-08-20 | Stop reason: HOSPADM

## 2020-08-20 RX ORDER — LABETALOL 20 MG/4 ML (5 MG/ML) INTRAVENOUS SYRINGE
10
Status: DISCONTINUED | OUTPATIENT
Start: 2020-08-20 | End: 2020-08-20 | Stop reason: HOSPADM

## 2020-08-20 RX ORDER — HYDROMORPHONE HYDROCHLORIDE 1 MG/ML
.3-.5 INJECTION, SOLUTION INTRAMUSCULAR; INTRAVENOUS; SUBCUTANEOUS EVERY 5 MIN PRN
Status: DISCONTINUED | OUTPATIENT
Start: 2020-08-20 | End: 2020-08-20 | Stop reason: HOSPADM

## 2020-08-20 RX ORDER — NALOXONE HYDROCHLORIDE 0.4 MG/ML
.1-.4 INJECTION, SOLUTION INTRAMUSCULAR; INTRAVENOUS; SUBCUTANEOUS
Status: DISCONTINUED | OUTPATIENT
Start: 2020-08-20 | End: 2020-08-20 | Stop reason: HOSPADM

## 2020-08-20 RX ORDER — MAGNESIUM SULFATE HEPTAHYDRATE 40 MG/ML
2 INJECTION, SOLUTION INTRAVENOUS DAILY PRN
Status: DISCONTINUED | OUTPATIENT
Start: 2020-08-20 | End: 2020-08-22 | Stop reason: HOSPADM

## 2020-08-20 RX ORDER — DEXAMETHASONE SODIUM PHOSPHATE 4 MG/ML
INJECTION, SOLUTION INTRA-ARTICULAR; INTRALESIONAL; INTRAMUSCULAR; INTRAVENOUS; SOFT TISSUE PRN
Status: DISCONTINUED | OUTPATIENT
Start: 2020-08-20 | End: 2020-08-20

## 2020-08-20 RX ORDER — LIDOCAINE 40 MG/G
CREAM TOPICAL
Status: DISCONTINUED | OUTPATIENT
Start: 2020-08-20 | End: 2020-08-22 | Stop reason: HOSPADM

## 2020-08-20 RX ORDER — POTASSIUM CHLORIDE 1.5 G/1.58G
20-40 POWDER, FOR SOLUTION ORAL
Status: DISCONTINUED | OUTPATIENT
Start: 2020-08-20 | End: 2020-08-22 | Stop reason: HOSPADM

## 2020-08-20 RX ORDER — NALOXONE HYDROCHLORIDE 0.4 MG/ML
.1-.4 INJECTION, SOLUTION INTRAMUSCULAR; INTRAVENOUS; SUBCUTANEOUS
Status: DISCONTINUED | OUTPATIENT
Start: 2020-08-20 | End: 2020-08-22 | Stop reason: HOSPADM

## 2020-08-20 RX ORDER — SODIUM CHLORIDE 9 MG/ML
INJECTION, SOLUTION INTRAVENOUS CONTINUOUS
Status: ACTIVE | OUTPATIENT
Start: 2020-08-20 | End: 2020-08-21

## 2020-08-20 RX ORDER — EPHEDRINE SULFATE 50 MG/ML
INJECTION, SOLUTION INTRAMUSCULAR; INTRAVENOUS; SUBCUTANEOUS PRN
Status: DISCONTINUED | OUTPATIENT
Start: 2020-08-20 | End: 2020-08-20

## 2020-08-20 RX ORDER — CALCIUM CARBONATE 500 MG/1
500 TABLET, CHEWABLE ORAL DAILY PRN
Status: DISCONTINUED | OUTPATIENT
Start: 2020-08-20 | End: 2020-08-22 | Stop reason: HOSPADM

## 2020-08-20 RX ORDER — LABETALOL 20 MG/4 ML (5 MG/ML) INTRAVENOUS SYRINGE
10-40 EVERY 10 MIN PRN
Status: DISCONTINUED | OUTPATIENT
Start: 2020-08-20 | End: 2020-08-22 | Stop reason: HOSPADM

## 2020-08-20 RX ORDER — POTASSIUM CHLORIDE 750 MG/1
20-40 TABLET, EXTENDED RELEASE ORAL
Status: DISCONTINUED | OUTPATIENT
Start: 2020-08-20 | End: 2020-08-22 | Stop reason: HOSPADM

## 2020-08-20 RX ORDER — PROCHLORPERAZINE 25 MG
12.5 SUPPOSITORY, RECTAL RECTAL EVERY 12 HOURS PRN
Status: DISCONTINUED | OUTPATIENT
Start: 2020-08-20 | End: 2020-08-22 | Stop reason: HOSPADM

## 2020-08-20 RX ORDER — FENTANYL CITRATE 50 UG/ML
INJECTION, SOLUTION INTRAMUSCULAR; INTRAVENOUS PRN
Status: DISCONTINUED | OUTPATIENT
Start: 2020-08-20 | End: 2020-08-20

## 2020-08-20 RX ORDER — IOPAMIDOL 755 MG/ML
75 INJECTION, SOLUTION INTRAVASCULAR ONCE
Status: COMPLETED | OUTPATIENT
Start: 2020-08-20 | End: 2020-08-20

## 2020-08-20 RX ORDER — LIDOCAINE HYDROCHLORIDE 20 MG/ML
INJECTION, SOLUTION INFILTRATION; PERINEURAL PRN
Status: DISCONTINUED | OUTPATIENT
Start: 2020-08-20 | End: 2020-08-20

## 2020-08-20 RX ORDER — BUPIVACAINE HYDROCHLORIDE AND EPINEPHRINE 2.5; 5 MG/ML; UG/ML
INJECTION, SOLUTION INFILTRATION; PERINEURAL PRN
Status: DISCONTINUED | OUTPATIENT
Start: 2020-08-20 | End: 2020-08-20 | Stop reason: HOSPADM

## 2020-08-20 RX ORDER — PROCHLORPERAZINE MALEATE 5 MG
5 TABLET ORAL EVERY 6 HOURS PRN
Status: DISCONTINUED | OUTPATIENT
Start: 2020-08-20 | End: 2020-08-22 | Stop reason: HOSPADM

## 2020-08-20 RX ORDER — ALBUMIN, HUMAN INJ 5% 5 %
SOLUTION INTRAVENOUS CONTINUOUS PRN
Status: DISCONTINUED | OUTPATIENT
Start: 2020-08-20 | End: 2020-08-20

## 2020-08-20 RX ORDER — ONDANSETRON 4 MG/1
4 TABLET, ORALLY DISINTEGRATING ORAL EVERY 6 HOURS PRN
Status: DISCONTINUED | OUTPATIENT
Start: 2020-08-20 | End: 2020-08-22 | Stop reason: HOSPADM

## 2020-08-20 RX ORDER — HYDRALAZINE HYDROCHLORIDE 20 MG/ML
2.5-5 INJECTION INTRAMUSCULAR; INTRAVENOUS EVERY 10 MIN PRN
Status: DISCONTINUED | OUTPATIENT
Start: 2020-08-20 | End: 2020-08-20 | Stop reason: HOSPADM

## 2020-08-20 RX ORDER — SODIUM CHLORIDE, SODIUM LACTATE, POTASSIUM CHLORIDE, CALCIUM CHLORIDE 600; 310; 30; 20 MG/100ML; MG/100ML; MG/100ML; MG/100ML
INJECTION, SOLUTION INTRAVENOUS CONTINUOUS PRN
Status: DISCONTINUED | OUTPATIENT
Start: 2020-08-20 | End: 2020-08-20

## 2020-08-20 RX ORDER — ONDANSETRON 2 MG/ML
INJECTION INTRAMUSCULAR; INTRAVENOUS PRN
Status: DISCONTINUED | OUTPATIENT
Start: 2020-08-20 | End: 2020-08-20

## 2020-08-20 RX ORDER — FENTANYL CITRATE 50 UG/ML
25-50 INJECTION, SOLUTION INTRAMUSCULAR; INTRAVENOUS
Status: DISCONTINUED | OUTPATIENT
Start: 2020-08-20 | End: 2020-08-20 | Stop reason: HOSPADM

## 2020-08-20 RX ORDER — MAGNESIUM SULFATE HEPTAHYDRATE 40 MG/ML
4 INJECTION, SOLUTION INTRAVENOUS EVERY 4 HOURS PRN
Status: DISCONTINUED | OUTPATIENT
Start: 2020-08-20 | End: 2020-08-22 | Stop reason: HOSPADM

## 2020-08-20 RX ORDER — METOCLOPRAMIDE 5 MG/1
5 TABLET ORAL EVERY 6 HOURS PRN
Status: DISCONTINUED | OUTPATIENT
Start: 2020-08-20 | End: 2020-08-22 | Stop reason: HOSPADM

## 2020-08-20 RX ORDER — ACETAMINOPHEN 325 MG/1
650 TABLET ORAL EVERY 4 HOURS PRN
Status: DISCONTINUED | OUTPATIENT
Start: 2020-08-23 | End: 2020-08-21

## 2020-08-20 RX ORDER — PROPOFOL 10 MG/ML
INJECTION, EMULSION INTRAVENOUS PRN
Status: DISCONTINUED | OUTPATIENT
Start: 2020-08-20 | End: 2020-08-20

## 2020-08-20 RX ORDER — OXYCODONE HYDROCHLORIDE 5 MG/1
5-10 TABLET ORAL EVERY 4 HOURS PRN
Status: DISCONTINUED | OUTPATIENT
Start: 2020-08-20 | End: 2020-08-22 | Stop reason: HOSPADM

## 2020-08-20 RX ORDER — METOCLOPRAMIDE HYDROCHLORIDE 5 MG/ML
5 INJECTION INTRAMUSCULAR; INTRAVENOUS EVERY 6 HOURS PRN
Status: DISCONTINUED | OUTPATIENT
Start: 2020-08-20 | End: 2020-08-22 | Stop reason: HOSPADM

## 2020-08-20 RX ORDER — POTASSIUM CHLORIDE 29.8 MG/ML
20 INJECTION INTRAVENOUS
Status: DISCONTINUED | OUTPATIENT
Start: 2020-08-20 | End: 2020-08-22 | Stop reason: HOSPADM

## 2020-08-20 RX ORDER — ONDANSETRON 2 MG/ML
4 INJECTION INTRAMUSCULAR; INTRAVENOUS EVERY 30 MIN PRN
Status: DISCONTINUED | OUTPATIENT
Start: 2020-08-20 | End: 2020-08-20 | Stop reason: HOSPADM

## 2020-08-20 RX ORDER — GLYCOPYRROLATE 0.2 MG/ML
INJECTION, SOLUTION INTRAMUSCULAR; INTRAVENOUS PRN
Status: DISCONTINUED | OUTPATIENT
Start: 2020-08-20 | End: 2020-08-20

## 2020-08-20 RX ORDER — POTASSIUM CL/LIDO/0.9 % NACL 10MEQ/0.1L
10 INTRAVENOUS SOLUTION, PIGGYBACK (ML) INTRAVENOUS
Status: DISCONTINUED | OUTPATIENT
Start: 2020-08-20 | End: 2020-08-22 | Stop reason: HOSPADM

## 2020-08-20 RX ORDER — CEFAZOLIN SODIUM 2 G/100ML
2 INJECTION, SOLUTION INTRAVENOUS
Status: COMPLETED | OUTPATIENT
Start: 2020-08-20 | End: 2020-08-20

## 2020-08-20 RX ORDER — FAMOTIDINE 10 MG
10 TABLET ORAL DAILY PRN
Status: DISCONTINUED | OUTPATIENT
Start: 2020-08-20 | End: 2020-08-22 | Stop reason: HOSPADM

## 2020-08-20 RX ORDER — MANNITOL 20 G/100ML
INJECTION, SOLUTION INTRAVENOUS PRN
Status: DISCONTINUED | OUTPATIENT
Start: 2020-08-20 | End: 2020-08-20

## 2020-08-20 RX ORDER — CEFAZOLIN SODIUM 1 G/3ML
1 INJECTION, POWDER, FOR SOLUTION INTRAMUSCULAR; INTRAVENOUS SEE ADMIN INSTRUCTIONS
Status: DISCONTINUED | OUTPATIENT
Start: 2020-08-20 | End: 2020-08-20 | Stop reason: HOSPADM

## 2020-08-20 RX ORDER — NALOXONE HYDROCHLORIDE 0.4 MG/ML
.1-.4 INJECTION, SOLUTION INTRAMUSCULAR; INTRAVENOUS; SUBCUTANEOUS
Status: DISCONTINUED | OUTPATIENT
Start: 2020-08-20 | End: 2020-08-20

## 2020-08-20 RX ORDER — SODIUM CHLORIDE, SODIUM LACTATE, POTASSIUM CHLORIDE, CALCIUM CHLORIDE 600; 310; 30; 20 MG/100ML; MG/100ML; MG/100ML; MG/100ML
INJECTION, SOLUTION INTRAVENOUS CONTINUOUS
Status: DISCONTINUED | OUTPATIENT
Start: 2020-08-20 | End: 2020-08-20 | Stop reason: HOSPADM

## 2020-08-20 RX ORDER — ONDANSETRON 2 MG/ML
4 INJECTION INTRAMUSCULAR; INTRAVENOUS EVERY 6 HOURS PRN
Status: DISCONTINUED | OUTPATIENT
Start: 2020-08-20 | End: 2020-08-22 | Stop reason: HOSPADM

## 2020-08-20 RX ORDER — HYDRALAZINE HYDROCHLORIDE 20 MG/ML
10-20 INJECTION INTRAMUSCULAR; INTRAVENOUS EVERY 30 MIN PRN
Status: DISCONTINUED | OUTPATIENT
Start: 2020-08-20 | End: 2020-08-22 | Stop reason: HOSPADM

## 2020-08-20 RX ORDER — POTASSIUM CHLORIDE 7.45 MG/ML
10 INJECTION INTRAVENOUS
Status: DISCONTINUED | OUTPATIENT
Start: 2020-08-20 | End: 2020-08-22 | Stop reason: HOSPADM

## 2020-08-20 RX ADMIN — ALBUMIN HUMAN: 0.05 INJECTION, SOLUTION INTRAVENOUS at 11:28

## 2020-08-20 RX ADMIN — PHENYLEPHRINE HYDROCHLORIDE 50 MCG: 10 INJECTION INTRAVENOUS at 10:53

## 2020-08-20 RX ADMIN — REMIFENTANIL HYDROCHLORIDE: 1 INJECTION, POWDER, LYOPHILIZED, FOR SOLUTION INTRAVENOUS at 16:19

## 2020-08-20 RX ADMIN — Medication 10 MG: at 08:00

## 2020-08-20 RX ADMIN — SODIUM CHLORIDE: 9 INJECTION, SOLUTION INTRAVENOUS at 19:08

## 2020-08-20 RX ADMIN — GLYCOPYRROLATE 0.1 MG: 0.2 INJECTION, SOLUTION INTRAMUSCULAR; INTRAVENOUS at 12:34

## 2020-08-20 RX ADMIN — ROCURONIUM BROMIDE 10 MG: 10 INJECTION INTRAVENOUS at 17:24

## 2020-08-20 RX ADMIN — ALBUMIN HUMAN: 0.05 INJECTION, SOLUTION INTRAVENOUS at 12:57

## 2020-08-20 RX ADMIN — ROCURONIUM BROMIDE 30 MG: 10 INJECTION INTRAVENOUS at 14:18

## 2020-08-20 RX ADMIN — ROCURONIUM BROMIDE 50 MG: 10 INJECTION INTRAVENOUS at 08:03

## 2020-08-20 RX ADMIN — PHENYLEPHRINE HYDROCHLORIDE 0.2 MCG/KG/MIN: 10 INJECTION INTRAVENOUS at 08:26

## 2020-08-20 RX ADMIN — ROCURONIUM BROMIDE 20 MG: 10 INJECTION INTRAVENOUS at 09:47

## 2020-08-20 RX ADMIN — REMIFENTANIL HYDROCHLORIDE: 1 INJECTION, POWDER, LYOPHILIZED, FOR SOLUTION INTRAVENOUS at 14:14

## 2020-08-20 RX ADMIN — SODIUM CHLORIDE, POTASSIUM CHLORIDE, SODIUM LACTATE AND CALCIUM CHLORIDE: 600; 310; 30; 20 INJECTION, SOLUTION INTRAVENOUS at 08:26

## 2020-08-20 RX ADMIN — DEXAMETHASONE SODIUM PHOSPHATE 4 MG: 4 INJECTION, SOLUTION INTRA-ARTICULAR; INTRALESIONAL; INTRAMUSCULAR; INTRAVENOUS; SOFT TISSUE at 17:11

## 2020-08-20 RX ADMIN — HYDRALAZINE HYDROCHLORIDE 20 MG: 20 INJECTION INTRAMUSCULAR; INTRAVENOUS at 21:27

## 2020-08-20 RX ADMIN — GLYCOPYRROLATE 0.2 MG: 0.2 INJECTION, SOLUTION INTRAMUSCULAR; INTRAVENOUS at 08:00

## 2020-08-20 RX ADMIN — MANNITOL 45 G: 20 INJECTION, SOLUTION INTRAVENOUS at 11:40

## 2020-08-20 RX ADMIN — PROPOFOL 120 MG: 10 INJECTION, EMULSION INTRAVENOUS at 08:03

## 2020-08-20 RX ADMIN — SODIUM CHLORIDE, PRESERVATIVE FREE 90 ML: 5 INJECTION INTRAVENOUS at 07:21

## 2020-08-20 RX ADMIN — Medication 1 G: at 15:19

## 2020-08-20 RX ADMIN — ROCURONIUM BROMIDE 10 MG: 10 INJECTION INTRAVENOUS at 16:45

## 2020-08-20 RX ADMIN — ROCURONIUM BROMIDE 20 MG: 10 INJECTION INTRAVENOUS at 12:42

## 2020-08-20 RX ADMIN — DEXAMETHASONE SODIUM PHOSPHATE 10 MG: 4 INJECTION, SOLUTION INTRA-ARTICULAR; INTRALESIONAL; INTRAMUSCULAR; INTRAVENOUS; SOFT TISSUE at 08:40

## 2020-08-20 RX ADMIN — FENTANYL CITRATE 250 MCG: 50 INJECTION, SOLUTION INTRAMUSCULAR; INTRAVENOUS at 08:03

## 2020-08-20 RX ADMIN — PROPOFOL 50 MG: 10 INJECTION, EMULSION INTRAVENOUS at 08:46

## 2020-08-20 RX ADMIN — ROCURONIUM BROMIDE 20 MG: 10 INJECTION INTRAVENOUS at 13:30

## 2020-08-20 RX ADMIN — SUGAMMADEX 200 MG: 100 INJECTION, SOLUTION INTRAVENOUS at 17:54

## 2020-08-20 RX ADMIN — IOPAMIDOL 75 ML: 755 INJECTION, SOLUTION INTRAVENOUS at 07:21

## 2020-08-20 RX ADMIN — GLYCOPYRROLATE 0.1 MG: 0.2 INJECTION, SOLUTION INTRAMUSCULAR; INTRAVENOUS at 11:20

## 2020-08-20 RX ADMIN — ROCURONIUM BROMIDE 10 MG: 10 INJECTION INTRAVENOUS at 16:23

## 2020-08-20 RX ADMIN — SODIUM CHLORIDE, POTASSIUM CHLORIDE, SODIUM LACTATE AND CALCIUM CHLORIDE: 600; 310; 30; 20 INJECTION, SOLUTION INTRAVENOUS at 07:40

## 2020-08-20 RX ADMIN — FENTANYL CITRATE 50 MCG: 50 INJECTION, SOLUTION INTRAMUSCULAR; INTRAVENOUS at 18:18

## 2020-08-20 RX ADMIN — PHENYLEPHRINE HYDROCHLORIDE 100 MCG: 10 INJECTION INTRAVENOUS at 10:24

## 2020-08-20 RX ADMIN — ROCURONIUM BROMIDE 20 MG: 10 INJECTION INTRAVENOUS at 11:20

## 2020-08-20 RX ADMIN — ROCURONIUM BROMIDE 20 MG: 10 INJECTION INTRAVENOUS at 10:17

## 2020-08-20 RX ADMIN — Medication 1 G: at 13:19

## 2020-08-20 RX ADMIN — ONDANSETRON 4 MG: 2 INJECTION INTRAMUSCULAR; INTRAVENOUS at 23:05

## 2020-08-20 RX ADMIN — ROCURONIUM BROMIDE 10 MG: 10 INJECTION INTRAVENOUS at 12:08

## 2020-08-20 RX ADMIN — SODIUM CHLORIDE, POTASSIUM CHLORIDE, SODIUM LACTATE AND CALCIUM CHLORIDE: 600; 310; 30; 20 INJECTION, SOLUTION INTRAVENOUS at 13:45

## 2020-08-20 RX ADMIN — Medication 5 MG: at 16:21

## 2020-08-20 RX ADMIN — ROCURONIUM BROMIDE 30 MG: 10 INJECTION INTRAVENOUS at 08:55

## 2020-08-20 RX ADMIN — REMIFENTANIL HYDROCHLORIDE 0.02 MCG/KG/MIN: 1 INJECTION, POWDER, LYOPHILIZED, FOR SOLUTION INTRAVENOUS at 08:30

## 2020-08-20 RX ADMIN — ROCURONIUM BROMIDE 30 MG: 10 INJECTION INTRAVENOUS at 15:12

## 2020-08-20 RX ADMIN — ONDANSETRON 4 MG: 2 INJECTION INTRAMUSCULAR; INTRAVENOUS at 16:52

## 2020-08-20 RX ADMIN — PHENYLEPHRINE HYDROCHLORIDE 50 MCG: 10 INJECTION INTRAVENOUS at 11:15

## 2020-08-20 RX ADMIN — LIDOCAINE HYDROCHLORIDE 100 MG: 20 INJECTION, SOLUTION INFILTRATION; PERINEURAL at 08:03

## 2020-08-20 RX ADMIN — Medication 2 G: at 08:19

## 2020-08-20 RX ADMIN — ROCURONIUM BROMIDE 10 MG: 10 INJECTION INTRAVENOUS at 16:06

## 2020-08-20 RX ADMIN — GLYCOPYRROLATE 0.2 MG: 0.2 INJECTION, SOLUTION INTRAMUSCULAR; INTRAVENOUS at 09:04

## 2020-08-20 RX ADMIN — MANNITOL 45 G: 20 INJECTION, SOLUTION INTRAVENOUS at 10:28

## 2020-08-20 RX ADMIN — ROCURONIUM BROMIDE 10 MG: 10 INJECTION INTRAVENOUS at 17:03

## 2020-08-20 RX ADMIN — GLYCOPYRROLATE 0.1 MG: 0.2 INJECTION, SOLUTION INTRAMUSCULAR; INTRAVENOUS at 11:48

## 2020-08-20 RX ADMIN — Medication 1 G: at 17:30

## 2020-08-20 RX ADMIN — HYDROMORPHONE HYDROCHLORIDE 0.2 MG: 1 INJECTION, SOLUTION INTRAMUSCULAR; INTRAVENOUS; SUBCUTANEOUS at 19:13

## 2020-08-20 RX ADMIN — Medication 1 G: at 11:23

## 2020-08-20 RX ADMIN — CALCIUM CARBONATE (ANTACID) CHEW TAB 500 MG 500 MG: 500 CHEW TAB at 23:16

## 2020-08-20 RX ADMIN — Medication: at 22:06

## 2020-08-20 RX ADMIN — FENTANYL CITRATE 25 MCG: 50 INJECTION, SOLUTION INTRAMUSCULAR; INTRAVENOUS at 19:22

## 2020-08-20 RX ADMIN — FENTANYL CITRATE 25 MCG: 50 INJECTION, SOLUTION INTRAMUSCULAR; INTRAVENOUS at 19:02

## 2020-08-20 RX ADMIN — CEFAZOLIN 1 G: 1 INJECTION, POWDER, FOR SOLUTION INTRAMUSCULAR; INTRAVENOUS at 09:30

## 2020-08-20 ASSESSMENT — ACTIVITIES OF DAILY LIVING (ADL)
TRANSFERRING: 0-->INDEPENDENT
AMBULATION: 0-->INDEPENDENT
DRESS: 0-->INDEPENDENT
SWALLOWING: 0-->SWALLOWS FOODS/LIQUIDS WITHOUT DIFFICULTY
FALL_HISTORY_WITHIN_LAST_SIX_MONTHS: NO
TOILETING: 0-->INDEPENDENT
COGNITION: 0 - NO COGNITION ISSUES REPORTED
RETIRED_COMMUNICATION: 0-->UNDERSTANDS/COMMUNICATES WITHOUT DIFFICULTY
RETIRED_EATING: 0-->INDEPENDENT
BATHING: 0-->INDEPENDENT

## 2020-08-20 ASSESSMENT — MIFFLIN-ST. JEOR: SCORE: 1440.5

## 2020-08-20 NOTE — ANESTHESIA PREPROCEDURE EVALUATION
"Anesthesia Pre-Procedure Evaluation    Patient: Neema Nixon   MRN:     [id number] Gender:   female   Age:    65 year old :      1955        Preoperative Diagnosis: Meningioma (H) [D32.9]   Procedure(s):  stealth assisted Frontotemporal craniotomy for tumor with stereotactic image guidance  INSERTION, DRAIN, SPINE, LUMBAR     LABS:  CBC:   Lab Results   Component Value Date    WBC 6.2 2020    WBC 6.3 2020    HGB 15.2 2020    HGB 15.5 2020    HCT 47.6 (H) 2020    HCT 48.1 (H) 2020     2020     2020     BMP:   Lab Results   Component Value Date     2020     2020    POTASSIUM 3.9 2020    POTASSIUM 4.0 2020    CHLORIDE 107 2020    CHLORIDE 106 2020    CO2 26 2020    CO2 28 2020    BUN 17 2020    BUN 20 2020    CR 1.07 (H) 2020    CR 1.03 2020    GLC 96 2020    GLC 94 2020     COAGS:   Lab Results   Component Value Date    PTT 41 (H) 2020    INR 1.03 2020     POC:   Lab Results   Component Value Date    BGM 97 2020     OTHER:   Lab Results   Component Value Date    GINO 9.3 2020        Preop Vitals    BP Readings from Last 3 Encounters:   20 135/65   20 136/85   20 136/85    Pulse Readings from Last 3 Encounters:   20 85   20 73   20 73      Resp Readings from Last 3 Encounters:   20 16   20 16   20 16    SpO2 Readings from Last 3 Encounters:   20 98%   20 98%   20 98%      Temp Readings from Last 1 Encounters:   20 36.6  C (97.8  F) (Oral)    Ht Readings from Last 1 Encounters:   20 1.727 m (5' 8\")      Wt Readings from Last 1 Encounters:   20 85.7 kg (188 lb 15 oz)    Estimated body mass index is 28.73 kg/m  as calculated from the following:    Height as of this encounter: 1.727 m (5' 8\").    Weight as of this encounter: 85.7 kg (188 lb 15 oz). "     LDA:  Peripheral IV 08/20/20 Left Hand (Active)   Site Assessment WDL 08/20/20 0631   Line Status Saline locked 08/20/20 0631   Phlebitis Scale 0-->no symptoms 08/20/20 0631   Infiltration Scale 0 08/20/20 0631   Extravasation? No 08/20/20 0631   Number of days: 0       Arterial Line 08/20/20 Radial (Active)   Number of days: 0       ETT (Active)   Number of days: 0        Past Medical History:   Diagnosis Date     Double vision 10/23/2019     GERD (gastroesophageal reflux disease) 06/01/2010     Meningioma (H) 07/24/2020     Vision loss of right eye 07/24/2020      Past Surgical History:   Procedure Laterality Date     LEG SURGERY Right 2013    after fracture     RECESSION RESECTION WITH ADJUSTABLE SUTURE BILATERAL Bilateral 2/3/2020    Procedure: BILATERAL STRABISMUS REPAIR;  Surgeon: Mikhail Hernández MD;  Location: [facility] OR      Allergies   Allergen Reactions     Sulfa Drugs Rash        Anesthesia Evaluation     . Pt has had prior anesthetic.            ROS/MED HX    ENT/Pulmonary:  - neg pulmonary ROS     Neurologic:     (+)other neuro brain tumor    Cardiovascular:  - neg cardiovascular ROS       METS/Exercise Tolerance:  >4 METS   Hematologic:  - neg hematologic  ROS       Musculoskeletal:  - neg musculoskeletal ROS       GI/Hepatic:  - neg GI/hepatic ROS       Renal/Genitourinary:  - ROS Renal section negative       Endo:  - neg endo ROS       Psychiatric:  - neg psychiatric ROS       Infectious Disease:  - neg infectious disease ROS       Malignancy:         Other:    (+) No chance of pregnancy C-spine cleared: N/A,                        PHYSICAL EXAM:   Mental Status/Neuro: A/A/O   Airway: Facies: Feasible  Mallampati: I  Mouth/Opening: Full  TM distance: > 6 cm  Neck ROM: Full   Respiratory: Auscultation: CTAB     Resp. Rate: Normal     Resp. Effort: Normal      CV: Rhythm: Regular  Rate: Age appropriate  Heart: Normal Sounds  Edema: None   Comments:      Dental: Normal Dentition                 Assessment:   ASA SCORE: 3    H&P: History and physical reviewed and following examination; no interval change.   Smoking Status:  Non-Smoker/Unknown   NPO Status: NPO Appropriate     Plan:   Anes. Type:  General   Pre-Medication: None   Induction:  IV (Standard)   Airway: ETT; Oral   Access/Monitoring: PIV; A-Line; 2nd PIV   Maintenance: Balanced     Postop Plan:   Postop Pain: Opioids  Postop Sedation/Airway: Not planned     PONV Management:   Adult Risk Factors: Female, Non-Smoker, Postop Opioids   Prevention: Ondansetron, Dexamethasone     CONSENT: Direct conversation   Plan and risks discussed with: Patient   Blood Products: Consented (ALL Blood Products)       Comments for Plan/Consent:  See full PAC H&P dated 7/29.  No interim changes.                  Rodri Hdz MD

## 2020-08-20 NOTE — ANESTHESIA CARE TRANSFER NOTE
Patient: Neema Nixon    Procedure(s):  stealth assisted Frontotemporal craniotomy for tumor with stereotactic image guidance  INSERTION, DRAIN, SPINE, LUMBAR    Diagnosis: Meningioma (H) [D32.9]  Diagnosis Additional Information: No value filed.    Anesthesia Type:   No value filed.     Note:  Airway :Face Mask  Patient transferred to:PACU  Handoff Report: Identifed the Patient, Identified the Reponsible Provider, Reviewed the pertinent medical history, Discussed the surgical course, Reviewed Intra-OP anesthesia mangement and issues during anesthesia, Set expectations for post-procedure period and Allowed opportunity for questions and acknowledgement of understanding      Vitals: (Last set prior to Anesthesia Care Transfer)    CRNA VITALS  8/20/2020 1733 - 8/20/2020 1818      8/20/2020             Resp Rate (observed):  14    EKG:  Sinus rhythm                Electronically Signed By: Polly Barnard CRNA, MEKA BERNAL  August 20, 2020  6:18 PM

## 2020-08-20 NOTE — ANESTHESIA POSTPROCEDURE EVALUATION
Anesthesia POST Procedure Evaluation    Patient: Neema Nixon   MRN:     [id number] Gender:   female   Age:    65 year old :      1955        Preoperative Diagnosis: Meningioma (H) [D32.9]   Procedure(s):  stealth assisted Frontotemporal craniotomy for tumor with stereotactic image guidance  INSERTION, DRAIN, SPINE, LUMBAR   Postop Comments: No value filed.     Anesthesia Type: General       Disposition: ICU            ICU Sign Out: Anesthesiologist/ICU physician sign out WAS performed   Postop Pain Control: Uneventful            Sign Out: Well controlled pain   PONV: No   Neuro/Psych: Uneventful            Sign Out: Acceptable/Baseline neuro status   Airway/Respiratory: Uneventful            Sign Out: Acceptable/Baseline resp. status   CV/Hemodynamics: Uneventful            Sign Out: Acceptable CV status   Other NRE: NONE   DID A NON-ROUTINE EVENT OCCUR? No         Last Anesthesia Record Vitals:  CRNA VITALS  2020 1733 - 2020 1833      2020             Resp Rate (observed):  14    EKG:  Sinus rhythm          Last PACU Vitals:  Vitals Value Taken Time   /67 2020  6:45 PM   Temp 37.3  C (99.2  F) 2020  6:15 PM   Pulse 90 2020  6:50 PM   Resp 16 2020  6:45 PM   SpO2 98 % 2020  6:50 PM   Temp src     NIBP     Pulse     SpO2     Resp     Temp 21  C (69.8  F) 2020  6:06 PM   Ht Rate     Temp 2     Vitals shown include unvalidated device data.      Electronically Signed By: Aguilar Christian MD, 2020, 6:50 PM

## 2020-08-20 NOTE — OR NURSING
Following the removal of the fluid warming unit from the operative field, it was observed that their was some liquid in the bottom of the unit.  The OR Charge Nurse was notified as well as Dr. De Paz and Dr. Godoy.  The unit was cleaned, removed from the OR, and sequestered by leadership.

## 2020-08-20 NOTE — ANESTHESIA PROCEDURE NOTES
Arterial Line Procedure Note  Staff -   Anesthesiologist:  Rodri Hdz MD      Performed By: anesthesiologist          Location: In OR After Induction  Line Placement:     Procedure:  Arterial Line    Insertion Site:  Radial    Insertion laterality:  Right    Skin Prep: Chloraprep      Patient Prep: mask, sterile gloves, hat and hand hygiene      Local skin infiltration:  None    Ultrasound Guided?: No      Catheter size:  20 gauge, Quick cath    Dressing:  Tegaderm    Arterial waveform: Yes

## 2020-08-20 NOTE — BRIEF OP NOTE
[facility], [place]    Brief Operative Note    Pre-operative diagnosis: Meningioma (H) [D32.9]  Post-operative diagnosis Meningioma    Procedure: Procedure(s):  stealth assisted Frontotemporal craniotomy for tumor with stereotactic image guidance  INSERTION, DRAIN, SPINE, LUMBAR  Surgeon: Surgeon(s) and Role:     * Deni De Paz MD - Primary     * Mina Godoy MD - Resident - Assisting  Anesthesia: General   Estimated blood loss: 150 cc  Drains: None  Specimens:   ID Type Source Tests Collected by Time Destination   A : Right Sphenoid Wing Tumor Tissue Cranium SURGICAL PATHOLOGY EXAM Deni De Paz MD 8/20/2020  1:07 PM    B : RIGHT Sphenoid Wing Tumor Tissue Other SURGICAL PATHOLOGY EXAM Deni De Paz MD 8/20/2020  3:17 PM    C : CUSA Contents Tissue Other SURGICAL PATHOLOGY EXAM Deni De Paz MD 8/20/2020  3:39 PM      Findings:   Gross total resection.  Complications: None.  Implants:   Implant Name Type Inv. Item Serial No.  Lot No. LRB No. Used Action   GRAFT DUREPAIR DURA MATRIX 12.5X10CM (4X5&quot;) [id number] Bone/Tissue/Biologic GRAFT DUREPAIR DURA MATRIX 12.5X10CM (4X5&quot;) [id number]  [employer], [employer]-NEURO [id number] Right 1 Implanted   IMP PLATE SYN STR DOG BONE LOW PROFILE 2H .502 Metallic Hardware/Loraine IMP PLATE SYN STR DOG BONE LOW PROFILE 2H .502 NONE SYNTHES-STRATEC NONE Right 4 Implanted   IMP BUR HOLE COVER 17MM LOW PROFILE .527 Metallic Hardware/Loraine IMP BUR HOLE COVER 17MM LOW PROFILE .527 NONE SYNTHES-STRATEC NONE Right 1 Implanted   IMP SCR SYN MATRIX LOW PRO 1.5X04MM SELF DRILL [id number] Metallic Hardware/Loraine IMP SCR SYN MATRIX LOW PRO 1.5X04MM SELF DRILL [id number] NONE SYNTHES-STRATEC NONE Right 12 Implanted       Dictation #[id number]

## 2020-08-21 ENCOUNTER — APPOINTMENT (OUTPATIENT)
Dept: MRI IMAGING | Facility: CLINIC | Age: 65
DRG: 025 | End: 2020-08-21
Attending: NURSE PRACTITIONER
Payer: COMMERCIAL

## 2020-08-21 ENCOUNTER — APPOINTMENT (OUTPATIENT)
Dept: OCCUPATIONAL THERAPY | Facility: CLINIC | Age: 65
DRG: 025 | End: 2020-08-21
Attending: NEUROLOGICAL SURGERY
Payer: COMMERCIAL

## 2020-08-21 LAB
ANION GAP SERPL CALCULATED.3IONS-SCNC: 4 MMOL/L (ref 3–14)
BUN SERPL-MCNC: 15 MG/DL (ref 7–30)
CALCIUM SERPL-MCNC: 8.7 MG/DL (ref 8.5–10.1)
CHLORIDE SERPL-SCNC: 112 MMOL/L (ref 94–109)
CO2 SERPL-SCNC: 28 MMOL/L (ref 20–32)
CREAT SERPL-MCNC: 1.16 MG/DL (ref 0.52–1.04)
ERYTHROCYTE [DISTWIDTH] IN BLOOD BY AUTOMATED COUNT: 13.7 % (ref 10–15)
GFR SERPL CREATININE-BSD FRML MDRD: 49 ML/MIN/{1.73_M2}
GLUCOSE SERPL-MCNC: 154 MG/DL (ref 70–99)
HCT VFR BLD AUTO: 36.6 % (ref 35–47)
HGB BLD-MCNC: 11.4 G/DL (ref 11.7–15.7)
MAGNESIUM SERPL-MCNC: 2.1 MG/DL (ref 1.6–2.3)
MCH RBC QN AUTO: 28.9 PG (ref 26.5–33)
MCHC RBC AUTO-ENTMCNC: 31.1 G/DL (ref 31.5–36.5)
MCV RBC AUTO: 93 FL (ref 78–100)
PHOSPHATE SERPL-MCNC: 3 MG/DL (ref 2.5–4.5)
PLATELET # BLD AUTO: 204 10E9/L (ref 150–450)
POTASSIUM SERPL-SCNC: 4.2 MMOL/L (ref 3.4–5.3)
RBC # BLD AUTO: 3.95 10E12/L (ref 3.8–5.2)
SODIUM SERPL-SCNC: 143 MMOL/L (ref 133–144)
TROPONIN I SERPL-MCNC: <0.015 UG/L (ref 0–0.04)
WBC # BLD AUTO: 13.9 10E9/L (ref 4–11)

## 2020-08-21 PROCEDURE — 97166 OT EVAL MOD COMPLEX 45 MIN: CPT | Mod: GO | Performed by: OCCUPATIONAL THERAPIST

## 2020-08-21 PROCEDURE — 70553 MRI BRAIN STEM W/O & W/DYE: CPT

## 2020-08-21 PROCEDURE — 97530 THERAPEUTIC ACTIVITIES: CPT | Mod: GO | Performed by: OCCUPATIONAL THERAPIST

## 2020-08-21 PROCEDURE — 84484 ASSAY OF TROPONIN QUANT: CPT | Performed by: STUDENT IN AN ORGANIZED HEALTH CARE EDUCATION/TRAINING PROGRAM

## 2020-08-21 PROCEDURE — 97535 SELF CARE MNGMENT TRAINING: CPT | Mod: GO | Performed by: OCCUPATIONAL THERAPIST

## 2020-08-21 PROCEDURE — 80048 BASIC METABOLIC PNL TOTAL CA: CPT | Performed by: STUDENT IN AN ORGANIZED HEALTH CARE EDUCATION/TRAINING PROGRAM

## 2020-08-21 PROCEDURE — 25500064 ZZH RX 255 OP 636: Performed by: NEUROLOGICAL SURGERY

## 2020-08-21 PROCEDURE — C9113 INJ PANTOPRAZOLE SODIUM, VIA: HCPCS | Performed by: STUDENT IN AN ORGANIZED HEALTH CARE EDUCATION/TRAINING PROGRAM

## 2020-08-21 PROCEDURE — 25000132 ZZH RX MED GY IP 250 OP 250 PS 637: Performed by: STUDENT IN AN ORGANIZED HEALTH CARE EDUCATION/TRAINING PROGRAM

## 2020-08-21 PROCEDURE — A9585 GADOBUTROL INJECTION: HCPCS | Performed by: NEUROLOGICAL SURGERY

## 2020-08-21 PROCEDURE — 93005 ELECTROCARDIOGRAM TRACING: CPT

## 2020-08-21 PROCEDURE — 36415 COLL VENOUS BLD VENIPUNCTURE: CPT | Performed by: STUDENT IN AN ORGANIZED HEALTH CARE EDUCATION/TRAINING PROGRAM

## 2020-08-21 PROCEDURE — 40000014 ZZH STATISTIC ARTERIAL MONITORING DAILY

## 2020-08-21 PROCEDURE — 85027 COMPLETE CBC AUTOMATED: CPT | Performed by: STUDENT IN AN ORGANIZED HEALTH CARE EDUCATION/TRAINING PROGRAM

## 2020-08-21 PROCEDURE — 25800030 ZZH RX IP 258 OP 636: Performed by: STUDENT IN AN ORGANIZED HEALTH CARE EDUCATION/TRAINING PROGRAM

## 2020-08-21 PROCEDURE — 93010 ELECTROCARDIOGRAM REPORT: CPT | Performed by: INTERNAL MEDICINE

## 2020-08-21 PROCEDURE — 83735 ASSAY OF MAGNESIUM: CPT | Performed by: STUDENT IN AN ORGANIZED HEALTH CARE EDUCATION/TRAINING PROGRAM

## 2020-08-21 PROCEDURE — 25000128 H RX IP 250 OP 636: Performed by: STUDENT IN AN ORGANIZED HEALTH CARE EDUCATION/TRAINING PROGRAM

## 2020-08-21 PROCEDURE — 20000004 ZZH R&B ICU UMMC

## 2020-08-21 PROCEDURE — 84100 ASSAY OF PHOSPHORUS: CPT | Performed by: STUDENT IN AN ORGANIZED HEALTH CARE EDUCATION/TRAINING PROGRAM

## 2020-08-21 RX ORDER — NALOXONE HYDROCHLORIDE 0.4 MG/ML
.1-.4 INJECTION, SOLUTION INTRAMUSCULAR; INTRAVENOUS; SUBCUTANEOUS
Status: DISCONTINUED | OUTPATIENT
Start: 2020-08-21 | End: 2020-08-21

## 2020-08-21 RX ORDER — ACETAMINOPHEN 325 MG/1
650 TABLET ORAL EVERY 4 HOURS PRN
Status: DISCONTINUED | OUTPATIENT
Start: 2020-08-21 | End: 2020-08-22 | Stop reason: HOSPADM

## 2020-08-21 RX ORDER — HYDROMORPHONE HYDROCHLORIDE 1 MG/ML
.3-.5 INJECTION, SOLUTION INTRAMUSCULAR; INTRAVENOUS; SUBCUTANEOUS
Status: DISCONTINUED | OUTPATIENT
Start: 2020-08-21 | End: 2020-08-22 | Stop reason: HOSPADM

## 2020-08-21 RX ORDER — SODIUM CHLORIDE 9 MG/ML
INJECTION, SOLUTION INTRAVENOUS CONTINUOUS
Status: DISCONTINUED | OUTPATIENT
Start: 2020-08-21 | End: 2020-08-22

## 2020-08-21 RX ORDER — SODIUM CHLORIDE 9 MG/ML
INJECTION, SOLUTION INTRAVENOUS CONTINUOUS
Status: DISCONTINUED | OUTPATIENT
Start: 2020-08-21 | End: 2020-08-21

## 2020-08-21 RX ORDER — GADOBUTROL 604.72 MG/ML
10 INJECTION INTRAVENOUS ONCE
Status: COMPLETED | OUTPATIENT
Start: 2020-08-21 | End: 2020-08-21

## 2020-08-21 RX ADMIN — PROCHLORPERAZINE EDISYLATE 5 MG: 5 INJECTION INTRAMUSCULAR; INTRAVENOUS at 09:56

## 2020-08-21 RX ADMIN — PANTOPRAZOLE SODIUM 40 MG: 40 INJECTION, POWDER, FOR SOLUTION INTRAVENOUS at 07:42

## 2020-08-21 RX ADMIN — PROCHLORPERAZINE EDISYLATE 5 MG: 5 INJECTION INTRAMUSCULAR; INTRAVENOUS at 00:06

## 2020-08-21 RX ADMIN — ACETAMINOPHEN 650 MG: 325 TABLET, FILM COATED ORAL at 14:23

## 2020-08-21 RX ADMIN — ONDANSETRON 4 MG: 4 TABLET, ORALLY DISINTEGRATING ORAL at 20:54

## 2020-08-21 RX ADMIN — ONDANSETRON 4 MG: 2 INJECTION INTRAMUSCULAR; INTRAVENOUS at 07:42

## 2020-08-21 RX ADMIN — GADOBUTROL 9 ML: 604.72 INJECTION INTRAVENOUS at 09:21

## 2020-08-21 RX ADMIN — SODIUM CHLORIDE: 9 INJECTION, SOLUTION INTRAVENOUS at 06:02

## 2020-08-21 ASSESSMENT — ACTIVITIES OF DAILY LIVING (ADL)
ADLS_ACUITY_SCORE: 11
PREVIOUS_RESPONSIBILITIES: MEAL PREP;HOUSEKEEPING;LAUNDRY;SHOPPING;DRIVING
ADLS_ACUITY_SCORE: 11

## 2020-08-21 ASSESSMENT — MIFFLIN-ST. JEOR: SCORE: 1440.5

## 2020-08-21 NOTE — PLAN OF CARE
4A Defer PT Consult  Discharge Planner PT   Patient plan for discharge: home  Current status: PT orders acknowledged and appreciated. Per discussion with OT and chart review, pt is mobilizing IND. Pt does not demonstrate IP PT needs. PT to complete orders.   Barriers to return to prior living situation: medical status  Recommendations for discharge: per OT, home  Rationale for recommendations: defer to OT    Thank you for your referral.         Entered by: Soniya Daniels 08/21/2020 3:32 PM

## 2020-08-21 NOTE — PROGRESS NOTES
[facility]  Neurosurgery Progress Note    Subjective: retrosternal pressure in the PM, EKG shows inversed T waves seen on previous EKGs, troponin negative; pressure responded to antacids, no acute events overnight    Objective:   Temp:  [98.2  F (36.8  C)-99.8  F (37.7  C)] 98.2  F (36.8  C)  Pulse:  [] 61  Resp:  [12-36] 17  BP: (102-139)/(41-81) 102/41  MAP:  [76 mmHg-99 mmHg] 79 mmHg  Arterial Line BP: ()/(49-72) 142/53  SpO2:  [96 %-100 %] 99 %  I/O last 3 completed shifts:  In: 3902 [P.O.:60; I.V.:3342]  Out: 3690 [Urine:3515; Emesis/NG output:25; Blood:150]    Gen: Appears comfortable, NAD  Wound: clean, dry, intact  HEENT: bruise inferior to right eye  Neurologic:  - Alert & Oriented to person, place, time, and situation  - Follows commands briskly  - Speech fluent, spontaneous. No aphasia or dysarthria.  - No gaze preference. No apparent hemineglect.  - PERRL, EOMI; no diplopia  - Strong eye closure, jaw clench, and cheek puff  - Face symmetric with sensation intact to light touch  - Palate elevates symmetrically, uvula midline, tongue protrudes midline  - No pronator drift  - Strength: 5/5 x4    Reflexes 2+ throughout    Sensation intact and symmetric to light touch throughout    LABS  Recent Labs   Lab Test 08/21/20  0300 08/20/20  0604 07/29/20  0940   WBC 13.9* 6.2 6.3   HGB 11.4* 15.2 15.5   MCV 93 90 90    255 230       Recent Labs   Lab Test 08/21/20  0300 08/20/20  1522 08/20/20  0604 07/29/20  0940    140 139 138   POTASSIUM 4.2  --  3.9 4.0   CHLORIDE 112*  --  107 106   CO2 28  --  26 28   BUN 15  --  17 20   CR 1.16*  --  1.07* 1.03   ANIONGAP 4  --  6 5   GINO 8.7  --  9.3 8.9   *  --  96 94       IMAGING  none    Assessment:  Neema Nixno is a 65 year old female postoperative day #1 from right frontotemporal craniotomy with anterior clinoidectomy for tumor resection; she has been clinically stable.    Plan:  - Serial neuro exams  -  Pain control  - HOB > 30 degrees  - Regular diet  - Bowel regimen  - PRN antiemetics  - PT/OT  - SCDs for DVT proph  - tentatively TTF today    Param Gutierrez MD  Neurosurgery Resident PGY-2    Please contact neurosurgery resident on call with questions.    Dial * * [phone], enter [phone] when prompted.

## 2020-08-21 NOTE — OP NOTE
Procedure Date: 08/20/2020      PREOPERATIVE DIAGNOSIS:    1. Right clinoidal meningioma  2. Early right optic neuropathy     POSTOPERATIVE DIAGNOSIS:  Same     PROCEDURES PERFORMED:   1.  Right frontotemporal craniotomy for resection of intradural tumor.   2.  Right optic-clinoidal osteotomy for approach for the anterior fossa  3.  Use of intraoperative microscope.   4.  Use of intraoperative Stealth neuronavigation.   5.  Insertion of lumbar drain.      STAFF SURGEON:  Deni De Paz MD      RESIDENT SURGEON:  Mina Godoy MD      ESTIMATED BLOOD LOSS:  150 mL      ANESTHESIA:  General endotracheal anesthesia.      DRAINS:  None.      FINDINGS:  Frozen consistent with meningioma.  Gross total resection performed.  Meningioma trailed into the optic canal.      INDICATIONS:  Ms. Nioxn is a 65-year-old female who initially presented to medical attention for diplopia and was found to have an incidental right anterior clinoidal meningioma on MRI.  For diplopia she underwent strabismus surgery with Dr. Hernández and had good improvement.  She has been followed for meningioma by Dr. Raza, who repeated the MRI which showed an increase in tumor size and the start of edema in the adjacent parenchyma over a 7-month interval.  She has not noticed any vision changes, only tightness all around her left eye.  Neuro-ophthalmology evaluation demonstrated early signs of right optic neuropathy. We discussed alternatives to surgery including watchful waiting and radiation.  The patient elected to undergo the above-stated procedure.      TECHNIQUE:  Ms. Nixon was brought to the operating room.  She was intubated by our Anesthesia colleagues.  After intubation, additional intravenous lines, a Garcia catheter and arterial lines were placed.  The patient was placed in the lateral decubitus position with the left side down and the right side up.  A lumbar drain was placed at approximately the L4-L5 disk space initially.  On  placement there was clear spinal fluid return.  It was hooked to a Cuellar drain and appeared to have good steady flow on initial placement.  After securing the lumbar drain, we then placed the patient back in the supine position.  Again, the lumbar drain was checked and it appeared to have good steady flow.      We began positioning the patient in the supine position with the head turned toward the left side.  The patient was placed in 3-pin Patton fixation head macias.  The head was slightly extended to allow proper surgical trajectory.  We then mapped out our incision in a typical pterional style fashion, slightly past the midline.  After marking out the incision, we shaved a small strip of hair.  We then registered the patient to the neuronavigation Uskape system and then prepped and draped in the usual sterile manner.      After timeout was performed to confirm patient identity and procedure, a #10 blade scalpel was used to make the initial incision over the skin.  Metzenbaum scissors was used to cut the galeal layer and preserve the periosteum.  We were able to identify the temporalis fascia and the muscle underlying it.  With these identified, we then performed an interfascial dissection, initially cutting posterior to the temporal fat pad and then bringing both the temporal fat pad and the frontalis branches of the facial nerve anteriorly over the deep portion of the fascial layer.  With this retracted anteriorly, we then made our temporalis muscle cuts and then reflected the muscle inferiorly.  Fishhooks were used to help retract the scalp forward and the muscle inferiorly.      At this point we then performed our craniotomy.  We used a  drill to place 3 jose holes near the keyhole, one anteriorly, one inferiorly and one posteriorly.  B1 with a footplate was used to turn the craniotomy. We used stereotaxy to plan our craniotomy and ensure we did not violate the frontal sinus, which was small on  the right side.  We did have to take off the footplate and drill off a small portion near the keyhole where the sphenoid wing was thick.  With the craniotomy off there was bleeding from the MMA that was controlled with bipolar electrocautery.  The dura appeared to be tattered and torn near the anterior frontal portion of our craniotomy.      After obtaining hemostasis we then began drilling off pieces of the sphenoid wing to flatten it so that we would have a better view towards the anterior clinoid.  With the anterior and lateral portions of the sphenoid bone flattened, we then brought in the microscope.      Under microscopic visualization, we were able to identify the anterior clinoid extradurally and the meningoorbital band laterally.  We coagulated the meningeal orbital band so that we could better have access to the anterior clinoid.  With the anterior clinoid exposed, we then were able to identify the borders of the lateral superior orbital fissure and the area where the optic nerve was entering into the optic canal.  We meticulously were able to identify areas of the attachments of the anterior clinoid.  We were able to elevate them using a combination of bipolar electrocautery and our 1 Penfield. The lumbar drain was opened but only < 5 ml were able to be drained. Mannitol 1 gram per kilogram was therefore administered.  Under constant irrigation, we thinned the optic roof and sphenoid wing attachments. We then drilled the optic strut. We unroofed the cavernous carotid artery, which was confirmed by doppler ultrasonography. The clinoid extended rather posteriorly, so we felt that the remainder of the clinoid could be more safely drilled intradurally.  The extradural portion did help decrease the blood supply to the tumor as we planned for resection intradurally at this point.      We took the scope out briefly to open up the dura.  We flapped it forward and then we were able to follow this down toward the  clinoid area and identify the meningioma coming off the clinoid.  The microscope was brought back into the field. The meningioma was approximately 2 cm large in width.  We began by bipolaring the surface of it and cutting into it.  Our strategy was to debulk the tumor and then peel away the capsule.  It was nicely devascularized. We spent a significant amount of time debulking the tumor by bipolaring it first and then cutting into large portions of it.  We were able to identify the optic nerve almost immediately and this provided us a good plane to ensure that we did not violate and go through any deeper to cause harm or injury.  As we continued to debulk we were able to get a plane and transect through multiple portions of the tumor until we eventually were able to cut it off its pedicle from the anterior clinoid.  We were able to identify that the meningioma itself was going into the lateral aspect of the optic canal.  We first removed the remainder of the tumor near the frontal lobe.  We peeled it off slowly.  There was a small amount of venous bleeding on the frontal lobe, however, careful arachnoid dissection helped us to peel it off without causing any significant harm.  We placed Surgicel over the area of small venous bleeding on the frontal lobe.  We then turned our attention to the remainder of the meningioma entering the optic canal.  We divided the falciform ligament over the optic canal and visualized the intracanilicular portion of the meningioma.   We took off the remaining portion of meningioma which was making contact with the optic nerve.  We were able to then follow the meningioma and then dissected the tumor out of the optic canal.  It appeared that we pulled it out en bloc and we achieved an apparent gross total resection using this maneuver. We began irrigating profusely.  We obtained hemostasis with bipolar electrocautery and placed Surgicel on the areas of small venous bleeding. We then  completed the majority of the clinoidectomy intradurally.  The dural opening over the clinoid was closed with an inlay and onlay piece of Durarepair.      We then began reapproximating the dura.  The inferior portion of the dura was reapproximated; however, because of the torn dura during opening we placed a large piece of Durepair to reapproximate the superior portion.  We sewed it in redundantly and then irrigated again copiously.  We placed DuraSeal on top of this.      We then placed peripheral tack-up stitches around the suture site and 2 pairs of central tack-ups near the bone.  The bone was plated with 4 dog bones and a jose hole cover plate.  The bone was pushed anteriorly to try to cover any anterior defects.  We again irrigated profusely and then began closing the muscle layers, reapproximating with 0 Vicryls.  The dermal layer was brought together with 2-0 Vicryls and the skin was closed with 3-0 monocryl in a running fashion.  The patient tolerated the procedure well and was then handed over to our Anesthesia colleagues.  Prior to extubation, we removed the lumbar drain and placed a 3-0 Monocryl stitch in the area of the lumbar drain.      Dr. De Paz was present during the critical portions of the case and immediately available for all else.         NICK DE PAZ MD       As dictated by DELANO VILLA MD            D: 2020   T: 2020   MT: LEON      Name:     JESSE MONTELONGO   MRN:      [id number]        Account:        [id number]   :      1955           Procedure Date: 2020      Document: [id number]        I was present during the key portions of the operation and I was immediately available for the entire procedure.    Nick De Paz MD PhD

## 2020-08-21 NOTE — DISCHARGE SUMMARY
[facility] Discharge Summary and Instructions    Neema Nixon MRN# [id number]   Age: 65 year old YOB: 1955     Date of Admission:  8/20/2020  Date of Discharge::  8/22/2020  Admitting Physician:  Deni De Paz MD  Discharge Physician:  Deni De Paz MD          Admission Diagnoses:     Meningioma (H) [D32.9]  1. Right clinoidal meningioma  2. Early right optic neuropathy          Discharge Diagnosis:     Meningioma (H) [D32.9]  1. Right clinoidal meningioma  2. Early right optic neuropathy          Procedures:     8/20/2020  1.  Right frontotemporal craniotomy for resection of intradural tumor.   2.  Right optic-clinoidal osteotomy for approach for the anterior fossa  3.  Use of intraoperative microscope.   4.  Use of intraoperative Stealth neuronavigation.   5.  Insertion of lumbar drain.            Brief History of Illness:     Ms. Neema Nixon is a 65 year-old female with past medical history for diplopia and right vision loss (detected by Ophthalmology, less noticeable by patient), s/p strabismus surgery in 2/2020, with improvement in diplopia. In working up her visual symptoms, an anterior clinoidal meningioma was found on MRI from 8/2019. The meningioma was located on the sphenoid and near the optic canal, the growth of the meningioma (from 1.4 to 1.7 cm) and increased in edema and FLAIR signal surrounding the mass observed from 8/2019 to 6/2020. As such, Ms. Nixon presents to [facility] on 8/20/2020 for open surgical biopsy and resection of the mass.          Hospital Course:     Ms. Nixon underwent right frontotemporal craniotomy with both extradural and intradural approaches for resection of anterior clinoidal meningioma on 8/20/2020. A lumbar drain was placed in the OR for ICP management during the case and subsequently removed in the OR after the case was completed. Frozen and permanent tumor samples were sent during the case. The operation was  "uncomplicated. She was extubated successfully in the OR and recovered well in the PACU. She was then transferred to the  Neuro-Surgical ICU for post-operative cares. The patient experienced chest pain and tachycardia on POD-1. Any new cardiac or pulmonary event was ruled out. On POD#1, her exam appeared to be at her pre-operative baseline of neurologically intact. A prominent bruise around her right eye was noted, and a head CT was obtained with revealed post-surgical changes, without acute intracranial blood. On POD#2, her bandages were removed. The incision was clean, dry, and intact upon examination. The patient was considered fit to be discharged on 8/22/2020.     Examination at discharge:    Vital signs:  Temp: 98.5  F (36.9  C) Temp src: Oral BP: 113/62 Pulse: 75   Resp: 14 SpO2: 100 % O2 Device: None (Room air) Oxygen Delivery: 1 LPM Height: 172.7 cm (5' 8\") Weight: 82.8 kg (182 lb 8.7 oz)  Estimated body mass index is 27.76 kg/m  as calculated from the following:    Height as of this encounter: 1.727 m (5' 8\").    Weight as of this encounter: 82.8 kg (182 lb 8.7 oz).    Gen: Appears comfortable, NAD  Wound: clean, dry, intact  HEENT: bruise inferior to right eye  Neurologic:  - Alert & Oriented to person, place, time, and situation  - Follows commands briskly  - Speech fluent, spontaneous. No aphasia or dysarthria.  - No gaze preference. No apparent hemineglect.  - PERRL, EOMI; no diplopia  - Strong eye closure, jaw clench, and cheek puff  - Face symmetric with sensation intact to light touch  - Palate elevates symmetrically, uvula midline, tongue protrudes midline  - No pronator drift  - Strength: 5/5 x4     Reflexes 2+ throughout     Sensation intact and symmetric to light touch throughout        Discharge Medications:     Current Discharge Medication List      CONTINUE these medications which have NOT CHANGED    Details   artificial tears OINT ophthalmic ointment Place into both eyes At Bedtime     "   cimetidine (TAGAMET) 200 MG tablet Take 200 mg by mouth as needed      cyanocobalamin (VITAMIN B-12) 1000 MCG tablet Take by mouth every morning       loratadine (CLARITIN) 10 MG tablet Take 10 mg by mouth as needed for allergies      Vitamin D, Cholecalciferol, 400 units TABS Take 1 tablet by mouth every morning       acetaminophen (TYLENOL) 325 MG tablet Take 325-650 mg by mouth every 6 hours as needed for mild pain                Discharge Instructions and Follow-Up:     Discharge diet: Regular   Discharge activity: You may advance activity as tolerated. No strenuous exercise or heay lifting greater than 10 lbs for 4 weeks or until seen and cleared in clinic.   Discharge follow-up: Follow-up with Neurosurgery JUN Margarita Mooney in 2 weeks for wound check.    Continue following with Dr. Hernández of Ophthalmology    Wound care: Ok to shower,however no scrubbing of the wound and no soaking of the wound, meaning no bathtubs or swimming pools. Pat dry only. Leave wound open to air.  Patient to have wound checked 2 weeks following surgery.    Wound location: right frontotemporal  Closure technique: 3-0 Monocryl running  Dressing needs: None  Post-op care at follow-up: None     Please call if you have:  1. increased pain, redness, drainage, swelling at your incision  2. fevers > 101.5 F degrees, headaches, vision changes, blurry vision  3. with any questions or concerns.  You may reach the Neurosurgery clinic at [phone] during regular work hours. ER at [phone].    and ask for the Neurosurgery Resident on call at [phone], during off hours or weekends.         Discharge Disposition:     Discharged to home        Vik Moreira MD  Resident Neurosurgery, PGY1

## 2020-08-21 NOTE — PLAN OF CARE
Major Shift Events:  Neuros intact besides baseline R field cut/blurriness, has R facial swelling with slight numbness. Dressing CDI. VSS, tylenol given for headache. Improving nausea and appetite. Up with SBA. Voiding well.  Plan: transfer to  when bed available.  For vital signs and complete assessments, please see documentation flowsheets.

## 2020-08-21 NOTE — PLAN OF CARE
Admitted/transferred from: PACU  Reason for admission/transfer: Frequent neuro exams/monitoring  Patient status upon admission/transfer: Alert, oriented x 4.  Developed  Some GERD, with coughing.  Denies pain.  Nausea with emesis once,  Controlled with zofran and compazine. Minimal po intake. MIV at 75 ml/hr.  Noticed inverted T wave and EKG done.  Troponins were negative. No  Chest pain per patient. 2 liters FiO2 weaned to room air.   Craniotomy   Dressing intact with small drainage.  Baseline tremors of head, fingers present.  Interventions: Tums given. Tagamet scheduled daily. Remove benites catheter  At 0600. Neuro checks q hour.   Plan: discontinue Itzel today.  Increase activity.  Possible transfer to floor.   2 RN skin assessment: completed by Jose MILES RN  Result of skin assessment and interventions/actions:No rashes, pressure  Ulcers or sores.   Height, weight, drug calc weight: done  Patient belongings: One valuable bag with david phone, clothes.   MDRO education (if applicable):

## 2020-08-22 VITALS
SYSTOLIC BLOOD PRESSURE: 113 MMHG | WEIGHT: 182.54 LBS | OXYGEN SATURATION: 100 % | RESPIRATION RATE: 14 BRPM | HEART RATE: 75 BPM | BODY MASS INDEX: 27.67 KG/M2 | TEMPERATURE: 98.5 F | DIASTOLIC BLOOD PRESSURE: 62 MMHG | HEIGHT: 68 IN

## 2020-08-22 LAB
ANION GAP SERPL CALCULATED.3IONS-SCNC: 2 MMOL/L (ref 3–14)
BUN SERPL-MCNC: 12 MG/DL (ref 7–30)
CALCIUM SERPL-MCNC: 8.4 MG/DL (ref 8.5–10.1)
CHLORIDE SERPL-SCNC: 110 MMOL/L (ref 94–109)
CO2 SERPL-SCNC: 29 MMOL/L (ref 20–32)
CREAT SERPL-MCNC: 0.87 MG/DL (ref 0.52–1.04)
ERYTHROCYTE [DISTWIDTH] IN BLOOD BY AUTOMATED COUNT: 13.8 % (ref 10–15)
GFR SERPL CREATININE-BSD FRML MDRD: 69 ML/MIN/{1.73_M2}
GLUCOSE SERPL-MCNC: 108 MG/DL (ref 70–99)
HCT VFR BLD AUTO: 30.2 % (ref 35–47)
HGB BLD-MCNC: 9.5 G/DL (ref 11.7–15.7)
MAGNESIUM SERPL-MCNC: 1.9 MG/DL (ref 1.6–2.3)
MCH RBC QN AUTO: 29.4 PG (ref 26.5–33)
MCHC RBC AUTO-ENTMCNC: 31.5 G/DL (ref 31.5–36.5)
MCV RBC AUTO: 94 FL (ref 78–100)
PHOSPHATE SERPL-MCNC: 2 MG/DL (ref 2.5–4.5)
PLATELET # BLD AUTO: 164 10E9/L (ref 150–450)
POTASSIUM SERPL-SCNC: 3.7 MMOL/L (ref 3.4–5.3)
RBC # BLD AUTO: 3.23 10E12/L (ref 3.8–5.2)
SODIUM SERPL-SCNC: 141 MMOL/L (ref 133–144)
WBC # BLD AUTO: 10.7 10E9/L (ref 4–11)

## 2020-08-22 PROCEDURE — 25000128 H RX IP 250 OP 636: Performed by: STUDENT IN AN ORGANIZED HEALTH CARE EDUCATION/TRAINING PROGRAM

## 2020-08-22 PROCEDURE — 36415 COLL VENOUS BLD VENIPUNCTURE: CPT | Performed by: STUDENT IN AN ORGANIZED HEALTH CARE EDUCATION/TRAINING PROGRAM

## 2020-08-22 PROCEDURE — 85027 COMPLETE CBC AUTOMATED: CPT | Performed by: STUDENT IN AN ORGANIZED HEALTH CARE EDUCATION/TRAINING PROGRAM

## 2020-08-22 PROCEDURE — 25800030 ZZH RX IP 258 OP 636: Performed by: STUDENT IN AN ORGANIZED HEALTH CARE EDUCATION/TRAINING PROGRAM

## 2020-08-22 PROCEDURE — 80048 BASIC METABOLIC PNL TOTAL CA: CPT | Performed by: STUDENT IN AN ORGANIZED HEALTH CARE EDUCATION/TRAINING PROGRAM

## 2020-08-22 PROCEDURE — 84100 ASSAY OF PHOSPHORUS: CPT | Performed by: STUDENT IN AN ORGANIZED HEALTH CARE EDUCATION/TRAINING PROGRAM

## 2020-08-22 PROCEDURE — 25000132 ZZH RX MED GY IP 250 OP 250 PS 637: Performed by: STUDENT IN AN ORGANIZED HEALTH CARE EDUCATION/TRAINING PROGRAM

## 2020-08-22 PROCEDURE — C9113 INJ PANTOPRAZOLE SODIUM, VIA: HCPCS | Performed by: STUDENT IN AN ORGANIZED HEALTH CARE EDUCATION/TRAINING PROGRAM

## 2020-08-22 PROCEDURE — 83735 ASSAY OF MAGNESIUM: CPT | Performed by: STUDENT IN AN ORGANIZED HEALTH CARE EDUCATION/TRAINING PROGRAM

## 2020-08-22 PROCEDURE — 25000125 ZZHC RX 250: Performed by: STUDENT IN AN ORGANIZED HEALTH CARE EDUCATION/TRAINING PROGRAM

## 2020-08-22 PROCEDURE — 25000132 ZZH RX MED GY IP 250 OP 250 PS 637: Performed by: NEUROLOGICAL SURGERY

## 2020-08-22 RX ORDER — OXYCODONE HYDROCHLORIDE 5 MG/1
5 TABLET ORAL EVERY 6 HOURS PRN
Qty: 6 TABLET | Refills: 0 | Status: SHIPPED | OUTPATIENT
Start: 2020-08-22 | End: 2020-11-18

## 2020-08-22 RX ORDER — AMOXICILLIN 250 MG
1 CAPSULE ORAL 2 TIMES DAILY PRN
Status: DISCONTINUED | OUTPATIENT
Start: 2020-08-22 | End: 2020-08-22 | Stop reason: HOSPADM

## 2020-08-22 RX ORDER — POLYETHYLENE GLYCOL 3350 17 G/17G
1 POWDER, FOR SOLUTION ORAL DAILY
Qty: 10 PACKET | Refills: 0 | Status: SHIPPED | OUTPATIENT
Start: 2020-08-22 | End: 2020-11-18

## 2020-08-22 RX ORDER — ONDANSETRON 4 MG/1
4-8 TABLET, FILM COATED ORAL EVERY 8 HOURS PRN
Qty: 40 TABLET | Refills: 0 | Status: SHIPPED | OUTPATIENT
Start: 2020-08-22 | End: 2021-04-28

## 2020-08-22 RX ORDER — SENNA AND DOCUSATE SODIUM 50; 8.6 MG/1; MG/1
1 TABLET, FILM COATED ORAL AT BEDTIME
Qty: 20 TABLET | Refills: 0 | Status: SHIPPED | OUTPATIENT
Start: 2020-08-22 | End: 2020-11-18

## 2020-08-22 RX ADMIN — DOCUSATE SODIUM 50 MG AND SENNOSIDES 8.6 MG 1 TABLET: 8.6; 5 TABLET, FILM COATED ORAL at 11:21

## 2020-08-22 RX ADMIN — Medication 10 MEQ: at 05:34

## 2020-08-22 RX ADMIN — PANTOPRAZOLE SODIUM 40 MG: 40 INJECTION, POWDER, FOR SOLUTION INTRAVENOUS at 07:44

## 2020-08-22 RX ADMIN — Medication 10 MEQ: at 07:45

## 2020-08-22 RX ADMIN — ACETAMINOPHEN 650 MG: 325 TABLET, FILM COATED ORAL at 09:27

## 2020-08-22 RX ADMIN — MAGNESIUM SULFATE 2 G: 2 INJECTION INTRAVENOUS at 04:14

## 2020-08-22 RX ADMIN — POTASSIUM PHOSPHATE, MONOBASIC AND POTASSIUM PHOSPHATE, DIBASIC 15 MMOL: 224; 236 INJECTION, SOLUTION INTRAVENOUS at 08:47

## 2020-08-22 RX ADMIN — ONDANSETRON 4 MG: 4 TABLET, ORALLY DISINTEGRATING ORAL at 08:23

## 2020-08-22 RX ADMIN — FAMOTIDINE 10 MG: 10 TABLET ORAL at 08:24

## 2020-08-22 ASSESSMENT — ACTIVITIES OF DAILY LIVING (ADL)
ADLS_ACUITY_SCORE: 13

## 2020-08-22 ASSESSMENT — MIFFLIN-ST. JEOR: SCORE: 1411.5

## 2020-08-22 NOTE — PLAN OF CARE
Major Shift Events:  q4h neuro checks. All neuros intact. Ongoing right periorbital bruising and edema improving. Tolerated sips of water. Intermittent nausea relieved by PRN ODT Zofran given 1x. No BM this shift. Voiding adequately. Up to bathroom with SBA. Denied pain throughout shift. Incision UTV, dressing intact with dried drainage. Magnesium replaced this shift per protocol and potassium replacement initiated. Phosphorus also due to be replaced.     Plan: Transfer to floor when bed available. Manage nausea with PRNs. Encourage PO intake as able. Replace electrolytes per protocol.    For vital signs and complete assessments, please see documentation flowsheets.      Marva Trejo RN on 8/22/2020 at 5:39 AM

## 2020-08-22 NOTE — PLAN OF CARE
D: Pt admitted for right fronotemporal craniotomy with anterior clinoidectomy for meningioma resection.  Surgery uncomplicated and pt recovered on 4A for neuro checks which are now q4 hours and WNL. Pt still has baseline tremor.    I/A:   NEURO: intact, chronic tremor present.   CARDS: 110-120s/60s, HRs 60-70s, NSR. Afebrile  RESP: LS clear on RA, Sats >92%  GIGU: Constipation with Senna give, RX for Senna and Miralax given to pt on discharge. Voiding well with bathroom privileges.   SKIN: right periorbital swelling and bruising. Right frontotemporal incision DUANE, CDI, no s/s of infection present. , otherwise intact.  Labs: replaced per protocol today.   Activity: Ambulated around unit x 2 today without dizziness, weakness, etc.    P:  Pt was given all belongings. Plan to discontinue PIV, w/c to discharge pharmacy and front door when meds ready.  Given hard copy Rx for Senna, Miralax, and Oxycodone 5 mg q 6 hours PRN, 5 tabs upon discharge.

## 2020-08-22 NOTE — PROGRESS NOTES
[facility]  Neurosurgery Progress Note    Subjective: some nausea this AM, no acute events overnight    Objective:   Temp:  [97.8  F (36.6  C)-98.7  F (37.1  C)] 98.1  F (36.7  C)  Pulse:  [] 85  Resp:  [9-29] 18  BP: (126-134)/(56-66) 126/63  SpO2:  [97 %-100 %] 100 %  I/O last 3 completed shifts:  In: 1909.25 [P.O.:220; I.V.:1689.25]  Out: -     Gen: Appears comfortable, NAD  Wound: clean, dry, intact  HEENT: bruise inferior to right eye  Neurologic:  - Alert & Oriented to person, place, time, and situation  - Follows commands briskly  - Speech fluent, spontaneous. No aphasia or dysarthria.  - No gaze preference. No apparent hemineglect.  - PERRL, EOMI; no diplopia  - Strong eye closure, jaw clench, and cheek puff  - Face symmetric with sensation intact to light touch  - Palate elevates symmetrically, uvula midline, tongue protrudes midline  - No pronator drift  - Strength: 5/5 x4    Reflexes 2+ throughout    Sensation intact and symmetric to light touch throughout    LABS  Recent Labs   Lab Test 08/22/20  0331 08/21/20  0300 08/20/20  0604   WBC 10.7 13.9* 6.2   HGB 9.5* 11.4* 15.2   MCV 94 93 90    204 255       Recent Labs   Lab Test 08/22/20  0331 08/21/20  0300 08/20/20  1522 08/20/20  0604    143 140 139   POTASSIUM 3.7 4.2  --  3.9   CHLORIDE 110* 112*  --  107   CO2 29 28  --  26   BUN 12 15  --  17   CR 0.87 1.16*  --  1.07*   ANIONGAP 2* 4  --  6   GINO 8.4* 8.7  --  9.3   * 154*  --  96       IMAGING  MRI brain 8/21/2020  Impression:  Expected early postsurgical changes from right sphenoid wing  meningioma resection with a small amount of extra-axial collection and  pneumocephalus in the right frontal lobe.    Assessment:  Neema Nixon is a 65 year old female postoperative day #2 from right frontotemporal craniotomy with anterior clinoidectomy for tumor resection; she has been clinically stable.    Plan:  - Serial neuro exams  - Pain control  - HOB  > 30 degrees  - Regular diet  - Bowel regimen  - PRN antiemetics  - PT/OT  - SCDs for DVT proph  - floor status; home vs TTF today    Param Gutierrez MD  Neurosurgery Resident PGY-2    Please contact neurosurgery resident on call with questions.    Dial * * [phone], enter [phone] when prompted.

## 2020-08-23 ENCOUNTER — PATIENT OUTREACH (OUTPATIENT)
Dept: CARE COORDINATION | Facility: CLINIC | Age: 65
End: 2020-08-23

## 2020-08-23 LAB — INTERPRETATION ECG - MUSE: NORMAL

## 2020-08-24 LAB — COPATH REPORT: NORMAL

## 2020-08-24 NOTE — PROGRESS NOTES
[facility]: Post-Discharge Note  SITUATION                                                      Admission:    Admission Date: 08/20/20   Reason for Admission: Right clinoidal meningioma  Discharge:   Discharge Date: 08/22/20  Discharge Diagnosis: Right clinoidal meningioma    BACKGROUND                                                      Ms. Neema Nixon is a 65 year-old female with past medical history for diplopia and right vision loss (detected by Ophthalmology, less noticeable by patient), s/p strabismus surgery in 2/2020, with improvement in diplopia. In working up her visual symptoms, an anterior clinoidal meningioma was found on MRI from 8/2019. The meningioma was located on the sphenoid and near the optic canal, the growth of the meningioma (from 1.4 to 1.7 cm) and increased in edema and FLAIR signal surrounding the mass observed from 8/2019 to 6/2020. As such, Ms. Nixon presents to [facility] on 8/20/2020 for open surgical biopsy and resection of the mass.    ASSESSMENT      Discharge Assessment  Patient reports symptoms are: Improved  Does the patient have all of their medications?: Yes  Does patient know what their new medications are for?: Yes  Does patient have a follow-up appointment scheduled?: Yes  Does patient have any other questions or concerns?: Yes(headache- will try tyleonl and ice. will let us know if it gets worse)    Post-op  Did the patient have surgery or a procedure: Yes  Incision: healing  Drainage: No  Bleeding: none  Fever: No  Chills: No  Redness: No  Warmth: No  Swelling: Yes  Incision site pain: Yes  Eating & Drinking: eating and drinking without complaints/concerns  PO Intake: regular diet  Bowel Function: normal  Urinary Status: voiding without complaint/concerns        PLAN                                                      Outpatient Plan:  Follow-up with Neurosurgery JUN Margarita Mooney in 2 weeks for wound check.     Continue following with Dr. Hernández  of Ophthalmology     Future Appointments   Date Time Provider Department Center   10/21/2020  9:30 AM Mikhail Hernández MD [facility]   5/12/2021 10:30 AM Mikhail Hernández MD [facility]           Minnie Camilo, [facility]

## 2020-08-27 ENCOUNTER — PRE VISIT (OUTPATIENT)
Dept: FAMILY MEDICINE | Facility: CLINIC | Age: 65
End: 2020-08-27

## 2020-08-27 ASSESSMENT — ENCOUNTER SYMPTOMS
PARALYSIS: 0
DIZZINESS: 1
WEAKNESS: 1
DISTURBANCES IN COORDINATION: 0
HEADACHES: 1
SEIZURES: 0
TREMORS: 0
NUMBNESS: 0
MEMORY LOSS: 0
LOSS OF CONSCIOUSNESS: 0
TINGLING: 0
SPEECH CHANGE: 0

## 2020-08-28 ENCOUNTER — DOCUMENTATION ONLY (OUTPATIENT)
Dept: OTHER | Facility: CLINIC | Age: 65
End: 2020-08-28

## 2020-08-28 NOTE — PLAN OF CARE
Discharge Planner OT   LATE ENTRY   4A   EVAL  Patient plan for discharge: home   Current status: Pt with new precautions, educated re compensatory strategies and home management energy conservations.    Barriers to return to prior living situation: medical needs, precautions  Recommendations for discharge: home w A  Rationale for recommendations: new precautions, assist for heavy ADLs       Entered by: Delia Alvarez 08/28/2020 8:12 AM

## 2020-08-28 NOTE — PROGRESS NOTES
Late entry     08/21/20 1200   Quick Adds   Type of Visit Initial Occupational Therapy Evaluation   Living Environment   Lives With spouse   Living Environment Comment IND PLOF, no concerns   Self-Care   Usual Activity Tolerance excellent   Current Activity Tolerance moderate   Equipment Currently Used at Home none   Functional Level   Ambulation 0-->independent   Transferring 0-->independent   Toileting 0-->independent   Bathing 0-->independent   Dressing 0-->independent   Eating 0-->independent   Communication 0-->understands/communicates without difficulty   Swallowing 0-->swallows foods/liquids without difficulty   Cognition 0 - no cognition issues reported   Fall history within last six months no   Which of the above functional risks had a recent onset or change? dressing   Prior Functional Level Comment IND   General Information   Onset of Illness/Injury or Date of Surgery - Date 08/21/20   Referring Physician Dr Raza   Additional Occupational Profile Info/Pertinent History of Current Problem 65-year-old female who initially presented to medical attention for diplopia and was found to have an incidental right anterior clinoidal meningioma on MRI.     Precautions/Limitations other (see comments)   General Info Comments up w A   Cognitive Status Examination   Orientation orientation to person, place and time   Level of Consciousness alert   Cognitive Comment intact   Pain Assessment   Patient Currently in Pain Yes, see Vital Sign flowsheet   Range of Motion (ROM)   ROM Comment WFL   Strength   Strength Comments WFL    Mobility   Bed Mobility Comments SBA   Transfer Skills   Transfer Comments SBA   Transfer Skill: Bed to Chair/Chair to Bed   Level of [place]: Bed to Chair stand-by assist   Balance   Balance Comments SBA Unilateral support on IV pole for comfort   Grooming   Level of [place]: Grooming stand-by assist   Eating/Self Feeding   Level of [place]: Eating independent   Instrumental  "Activities of Daily Living (IADL)   Previous Responsibilities meal prep;housekeeping;laundry;shopping;driving   Activities of Daily Living Analysis   Impairments Contributing to Impaired Activities of Daily Living pain;post surgical precautions   General Therapy Interventions   Planned Therapy Interventions ADL retraining;IADL retraining;bed mobility training   Clinical Impression   Criteria for Skilled Therapeutic Interventions Met yes, treatment indicated   OT Diagnosis crani   Influenced by the following impairments post op precautions and pain   Assessment of Occupational Performance 1-3 Performance Deficits   Identified Performance Deficits driving, dressing, home management   Clinical Decision Making (Complexity) Moderate complexity   Therapy Frequency Daily   Predicted Duration of Therapy Intervention (days/wks) 3 days   Anticipated Discharge Disposition Home with Assist   Risks and Benefits of Treatment have been explained. Yes   Patient, Family & other staff in agreement with plan of care Yes   [facility] TM \"6 Clicks\"   2016, Trustees of [facility], under license to [employer].  All rights reserved.   6 Clicks Short Forms Daily Activity Inpatient Short Form   [facility]  \"6 Clicks\" Daily Activity Inpatient Short Form   1. Putting on and taking off regular lower body clothing? 3 - A Little   2. Bathing (including washing, rinsing, drying)? 4 - None   3. Toileting, which includes using toilet, bedpan or urinal? 4 - None   4. Putting on and taking off regular upper body clothing? 4 - None   5. Taking care of personal grooming such as brushing teeth? 4 - None   6. Eating meals? 4 - None   Daily Activity Raw Score (Score out of 24.Lower scores equate to lower levels of function) 23   Total Evaluation Time   Total Evaluation Time (Minutes) 5     "

## 2020-09-01 NOTE — PROGRESS NOTES
"  [facility]      Name: Neema Nixon  MRN: [id number]  Age: 65 year old  : 2020     Chief Complaint:   2 week post operative follow up    History of Present Illness:   Neema Nixon is a pleasant 65 year old female with a history of diplopia and right vision loss who is here today for her two week post operative follow up. She underwent a frontotemporal craniotomy with extradural and intradural approaches for resection of her anterior clinoidal meningioma on 2020. Surgical pathology revealed a grade 1 meningioma. She had chest pain and tachycardia on POD1, cardiac and pulmonary events were ruled out and symptoms resolved. She was noted to have a large bruise around her right eye post-operatively, head CT was reassuring. She discharged on 2020. Today she is feeling well. Her vision is stable. She denies new headaches, vertigo, dizziness, paresthesias or weakness. She does note that, with certain head positions, she has a sensation and/or almost a sound of \"dripping\" in her sinuses. She denies any actual fluid out of her ear or nose with this sensation. It happens only occasionally. She also notes a slight plugged feeling in the right ear. Feels her hearing is normal. She has been showering but protecting the incision with gauze, so we talked about it being fine to let the incision get wet at this point but no submerging or soaking. She denies new fever, chills, myalgias, or other complaints.       She has a follow up with Dr. Hernández in Neuro-ophthalmology on .    Review of Systems:   Pertinent items are noted in HPI or as in patient entered ROS below, remainder of complete ROS is negative.     Physical Exam:   /75 (BP Location: Right arm, Patient Position: Sitting, Cuff Size: Adult Large)   Pulse 95   Wt 83.2 kg (183 lb 8 oz)   SpO2 99%   BMI 27.90 kg/m    General: No acute distress.   Eyes: Conjunctivae are normal.  Neck: Normal range " "of motion.    Resp: No respiratory distress.   MSK: Moves all extremities.  No obvious deformity.  Ears: Right TM was partially occluded with cerumen.  Neuro: The patient is fully oriented and quite pleasant. Speech normal. Extraocular movements are intact without nystagmus. Facial sensation is intact in V1, V2, V3 distributions. Facial nerve function is normal save slight lag in right eyebrow raise. Patient has some movement but not full range with eyebrows up on the right. Palate is symmetric. Shoulders are full strength. Tongue is midline. There is no pronator drift. Full strength in all extremities. Sensation intact throughout. No dysmetria with finger-nose-finger testing. Gait is normal.  Psych: Normal mood and affect. Behavior is normal.    Incision: Her right pterional incision is clean, dry, and intact, well healing with absorbable sutures in place. Lumbar drain also looks clean, dry, and intact with absorbable suture present. No evidence for infection.    Imaging:  Brain MRI was done following resection on 8/21/2020 revealing postsurgical changes with small right frontal pneumocephalus. No new imaging to review today.     Assessment:  1. Right anterior clinoidal meningioma grade 1, s/p resection  2. Facial nerve dysfunction, right eyebrow  3. Right cerumen impaction    Plan:  1. We will plan to see her back for a 3 month post-operative follow up with Dr. De Paz, with MRI prior to office visit. She is doing very well so far and was encouraged to call with any questions.   2. Reassurance given, likely post-operative partial palsy that should improve with time.   3. We discussed the \"dripping\" sound in her sinuses, she will monitor these symptoms and will follow up if things worsen or intensify. She will try some debrox drops for the right cerumen impaction.    We discussed warning signs for which she should contact our office prior to her next follow up in November.       Margarita Mooney PA-C      "

## 2020-09-02 ENCOUNTER — OFFICE VISIT (OUTPATIENT)
Dept: NEUROSURGERY | Facility: CLINIC | Age: 65
End: 2020-09-02
Payer: COMMERCIAL

## 2020-09-02 VITALS
DIASTOLIC BLOOD PRESSURE: 75 MMHG | OXYGEN SATURATION: 99 % | HEART RATE: 95 BPM | SYSTOLIC BLOOD PRESSURE: 110 MMHG | BODY MASS INDEX: 27.9 KG/M2 | WEIGHT: 183.5 LBS

## 2020-09-02 DIAGNOSIS — D32.9 MENINGIOMA (H): Primary | ICD-10-CM

## 2020-09-02 ASSESSMENT — PAIN SCALES - GENERAL: PAINLEVEL: NO PAIN (0)

## 2020-09-02 NOTE — LETTER
"2020       RE: Neema Nixon  [address]  [place] [address]     Dear Colleague,    Thank you for referring your patient, Neema Nixon, to the [facility] NEUROSURGERY at [facility]. Please see a copy of my visit note below.    [facility]    Name: Neema Nixon  MRN: [id number]  Age: 65 year old  : 2020     Chief Complaint:   2 week post operative follow up    History of Present Illness:   Neema Nixon is a pleasant 65 year old female with a history of diplopia and right vision loss who is here today for her two week post operative follow up. She underwent a frontotemporal craniotomy with extradural and intradural approaches for resection of her anterior clinoidal meningioma on 2020. Surgical pathology revealed a grade 1 meningioma. She had chest pain and tachycardia on POD1, cardiac and pulmonary events were ruled out and symptoms resolved. She was noted to have a large bruise around her right eye post-operatively, head CT was reassuring. She discharged on 2020. Today she is feeling well. Her vision is stable. She denies new headaches, vertigo, dizziness, paresthesias or weakness. She does note that, with certain head positions, she has a sensation and/or almost a sound of \"dripping\" in her sinuses. She denies any actual fluid out of her ear or nose with this sensation. It happens only occasionally. She also notes a slight plugged feeling in the right ear. Feels her hearing is normal. She has been showering but protecting the incision with gauze, so we talked about it being fine to let the incision get wet at this point but no submerging or soaking. She denies new fever, chills, myalgias, or other complaints.       She has a follow up with Dr. Hernández in Neuro-ophthalmology on .    Review of Systems:   Pertinent items are noted in HPI or as in patient entered ROS below, remainder of " "complete ROS is negative.     Physical Exam:   /75 (BP Location: Right arm, Patient Position: Sitting, Cuff Size: Adult Large)   Pulse 95   Wt 83.2 kg (183 lb 8 oz)   SpO2 99%   BMI 27.90 kg/m    General: No acute distress.   Eyes: Conjunctivae are normal.  Neck: Normal range of motion.    Resp: No respiratory distress.   MSK: Moves all extremities.  No obvious deformity.  Ears: Right TM was partially occluded with cerumen.  Neuro: The patient is fully oriented and quite pleasant. Speech normal. Extraocular movements are intact without nystagmus. Facial sensation is intact in V1, V2, V3 distributions. Facial nerve function is normal save slight lag in right eyebrow raise. Patient has some movement but not full range with eyebrows up on the right. Palate is symmetric. Shoulders are full strength. Tongue is midline. There is no pronator drift. Full strength in all extremities. Sensation intact throughout. No dysmetria with finger-nose-finger testing. Gait is normal.  Psych: Normal mood and affect. Behavior is normal.    Incision: Her right pterional incision is clean, dry, and intact, well healing with absorbable sutures in place. Lumbar drain also looks clean, dry, and intact with absorbable suture present. No evidence for infection.    Imaging:  Brain MRI was done following resection on 8/21/2020 revealing postsurgical changes with small right frontal pneumocephalus. No new imaging to review today.     Assessment:  1. Right anterior clinoidal meningioma grade 1, s/p resection  2. Facial nerve dysfunction, right eyebrow  3. Right cerumen impaction    Plan:  1. We will plan to see her back for a 3 month post-operative follow up with Dr. De Paz, with MRI prior to office visit. She is doing very well so far and was encouraged to call with any questions.   2. Reassurance given, likely post-operative partial palsy that should improve with time.   3. We discussed the \"dripping\" sound in her sinuses, she will " monitor these symptoms and will follow up if things worsen or intensify. She will try some debrox drops for the right cerumen impaction.    We discussed warning signs for which she should contact our office prior to her next follow up in November.    Again, thank you for allowing me to participate in the care of your patient.  Sincerely,    Margarita Mooney PA-C

## 2020-09-02 NOTE — LETTER
"2020       RE: Neema Nixon  [address] [address]  [place] [address]     Dear Colleague,    Thank you for referring your patient, Neema Nixon, to the [facility] NEUROSURGERY at [facility]. Please see a copy of my visit note below.      [facility]      Name: Neema Nixon  MRN: [id number]  Age: 65 year old  : 2020     Chief Complaint:   2 week post operative follow up    History of Present Illness:   Neema Nixon is a pleasant 65 year old female with a history of diplopia and right vision loss who is here today for her two week post operative follow up. She underwent a frontotemporal craniotomy with extradural and intradural approaches for resection of her anterior clinoidal meningioma on 2020. Surgical pathology revealed a grade 1 meningioma. She had chest pain and tachycardia on POD1, cardiac and pulmonary events were ruled out and symptoms resolved. She was noted to have a large bruise around her right eye post-operatively, head CT was reassuring. She discharged on 2020. Today she is feeling well. Her vision is stable. She denies new headaches, vertigo, dizziness, paresthesias or weakness. She does note that, with certain head positions, she has a sensation and/or almost a sound of \"dripping\" in her sinuses. She denies any actual fluid out of her ear or nose with this sensation. It happens only occasionally. She also notes a slight plugged feeling in the right ear. Feels her hearing is normal. She has been showering but protecting the incision with gauze, so we talked about it being fine to let the incision get wet at this point but no submerging or soaking. She denies new fever, chills, myalgias, or other complaints.       She has a follow up with Dr. Hernández in Neuro-ophthalmology on .    Review of Systems:   Pertinent items are noted in HPI or as in patient entered ROS below, remainder of " "complete ROS is negative.     Physical Exam:   /75 (BP Location: Right arm, Patient Position: Sitting, Cuff Size: Adult Large)   Pulse 95   Wt 83.2 kg (183 lb 8 oz)   SpO2 99%   BMI 27.90 kg/m    General: No acute distress.   Eyes: Conjunctivae are normal.  Neck: Normal range of motion.    Resp: No respiratory distress.   MSK: Moves all extremities.  No obvious deformity.  Ears: Right TM was partially occluded with cerumen.  Neuro: The patient is fully oriented and quite pleasant. Speech normal. Extraocular movements are intact without nystagmus. Facial sensation is intact in V1, V2, V3 distributions. Facial nerve function is normal save slight lag in right eyebrow raise. Patient has some movement but not full range with eyebrows up on the right. Palate is symmetric. Shoulders are full strength. Tongue is midline. There is no pronator drift. Full strength in all extremities. Sensation intact throughout. No dysmetria with finger-nose-finger testing. Gait is normal.  Psych: Normal mood and affect. Behavior is normal.    Incision: Her right pterional incision is clean, dry, and intact, well healing with absorbable sutures in place. Lumbar drain also looks clean, dry, and intact with absorbable suture present. No evidence for infection.    Imaging:  Brain MRI was done following resection on 8/21/2020 revealing postsurgical changes with small right frontal pneumocephalus. No new imaging to review today.     Assessment:  1. Right anterior clinoidal meningioma grade 1, s/p resection  2. Facial nerve dysfunction, right eyebrow  3. Right cerumen impaction    Plan:  1. We will plan to see her back for a 3 month post-operative follow up with Dr. De Paz, with MRI prior to office visit. She is doing very well so far and was encouraged to call with any questions.   2. Reassurance given, likely post-operative partial palsy that should improve with time.   3. We discussed the \"dripping\" sound in her sinuses, she will " monitor these symptoms and will follow up if things worsen or intensify. She will try some debrox drops for the right cerumen impaction.    We discussed warning signs for which she should contact our office prior to her next follow up in November.       Margarita Mooney PA-C        Again, thank you for allowing me to participate in the care of your patient.      Sincerely,    Margarita Mooney PA-C

## 2020-09-02 NOTE — NURSING NOTE
Chief Complaint   Patient presents with     RECHECK     [facility] RETURN - 2 WEEK WOUND CHECK       Romain Albarran, EMT

## 2020-09-02 NOTE — PATIENT INSTRUCTIONS
Follow up Dr De Paz in 3 months with MRI Brain with and without contrast prior.    Call Frances RN Care Coordinator with questions/concerns     Thank you for using [facility]

## 2020-09-11 ENCOUNTER — TELEPHONE (OUTPATIENT)
Dept: NEUROSURGERY | Facility: CLINIC | Age: 65
End: 2020-09-11

## 2020-09-11 NOTE — TELEPHONE ENCOUNTER
"I spoke with Neema, she's going to try to send us a pic through Mychart of her temple.  She's had some drainage that she thinks is occurring at night, mostly her hair is\"crispy\" when she wakes up along the incision line. She has not seen any active drainage. No redness, swelling, or new pain along the wound. She will monitor and I'll call her next week to discuss.  Will call her back once we get the picture mentioned above.   Margarita"

## 2020-09-11 NOTE — TELEPHONE ENCOUNTER
[facility] Call Center    Phone Message    May a detailed message be left on voicemail: yes     Reason for Call: Other: Two Questions for provider:    1) Swelling has started to go down in head, however, on the temple side where the surgery was done, that area has sunken in and skin is bruised. Is this normal?    2) Still having some drainage (mostly clear) out of two spots.  When pt wakes up in the morning, her hair is crispy, so she knows there has been drainage. And she notices it after showering.  How long does this last?  And is it ok that it is still draining?    Please call pt. Thank you.    Action Taken: Message routed to:  [facility] ([facility]  Neurosurgery    Travel Screening: Not Applicable

## 2020-10-21 ENCOUNTER — OFFICE VISIT (OUTPATIENT)
Dept: OPHTHALMOLOGY | Facility: CLINIC | Age: 65
End: 2020-10-21
Attending: OPHTHALMOLOGY
Payer: COMMERCIAL

## 2020-10-21 DIAGNOSIS — H47.091: ICD-10-CM

## 2020-10-21 DIAGNOSIS — H53.10 SUBJECTIVE VISUAL DISTURBANCE: Primary | ICD-10-CM

## 2020-10-21 PROCEDURE — G0463 HOSPITAL OUTPT CLINIC VISIT: HCPCS | Performed by: TECHNICIAN/TECHNOLOGIST

## 2020-10-21 PROCEDURE — 92012 INTRM OPH EXAM EST PATIENT: CPT | Performed by: OPHTHALMOLOGY

## 2020-10-21 PROCEDURE — 92133 CPTRZD OPH DX IMG PST SGM ON: CPT | Performed by: OPHTHALMOLOGY

## 2020-10-21 PROCEDURE — 92083 EXTENDED VISUAL FIELD XM: CPT | Performed by: OPHTHALMOLOGY

## 2020-10-21 ASSESSMENT — VISUAL ACUITY
OS_CC: 20/20
OS_CC+: -2
METHOD: SNELLEN - LINEAR
OD_CC: 20/25
OD_CC+: -3
CORRECTION_TYPE: GLASSES

## 2020-10-21 ASSESSMENT — REFRACTION_WEARINGRX
OD_CYLINDER: SPHERE
OS_AXIS: 038
OS_CYLINDER: +1.25
OD_SPHERE: -11.00
OS_SPHERE: -12.25

## 2020-10-21 ASSESSMENT — EXTERNAL EXAM - RIGHT EYE: OD_EXAM: NORMAL

## 2020-10-21 ASSESSMENT — SLIT LAMP EXAM - LIDS
COMMENTS: MGD
COMMENTS: MGD

## 2020-10-21 ASSESSMENT — CUP TO DISC RATIO
OD_RATIO: 0.5
OS_RATIO: 0.5

## 2020-10-21 ASSESSMENT — EXTERNAL EXAM - LEFT EYE: OS_EXAM: NORMAL

## 2020-10-21 ASSESSMENT — CONF VISUAL FIELD
METHOD: COUNTING FINGERS
OD_NORMAL: 1
OS_NORMAL: 1

## 2020-10-21 ASSESSMENT — TONOMETRY
IOP_METHOD: ICARE
OD_IOP_MMHG: 18
OS_IOP_MMHG: 17

## 2020-10-21 NOTE — PROGRESS NOTES
1. 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  Doing very well after surgical resection by Dr. De Paz on 8/20/20.  Visual acuity and color vision remains normal in both eyes.  There remains no afferent pupillary defect.  OCT retinal nerve fiber layer shows no change in the right eye as compared to the presurgical baseline showing only very mild superior atrophy of the right nerve.  Formal visual fields in both eyes remain essentially full.  Patient has a follow-up scheduled with Dr. De Paz in November.  Follow-up with me in 6 months or sooner as needed.    Historical data:    08/2019 MRI brain with and without contrast:  IMPRESSION: Ovoid uniformly enhancing 1.4 cm extra-axial dural-based  mass positioned along the superior aspect of the right anterior  clinoid process, most likely representing a meningioma.    06/24/20 MRI brain with and without IV contrast   IMPRESSION: Interval increase in size of extra-axial enhancing mass  arising superiorly off the anterior clinoid process. The mass, which  most likely represents a meningioma, currently measures 1.7 cm in  greatest dimensions as compared to 1.4 cm on the prior study. There is  also some new edema in the adjacent brain parenchyma.     Interim history:    Last seen in July 2020.    8/20/20 patient underwent surgery with Dr. De Paz:    1.  Right frontotemporal craniotomy for resection of intradural tumor.   2.  Right optic-clinoidal osteotomy for approach for the anterior fossa  3.  Use of intraoperative microscope.   4.  Use of intraoperative Stealth neuronavigation.   5.  Insertion of lumbar drain.      Pathology showed a grade 1 meningioma.  Patient has follow-up with Dr. De Paz on 11/18/20.    Patient notes stable vision in both eyes since before neurosurgery.  No diplopia.    MRI brain and  orbits with and without IV contrast 8/20/20:  Impression:  Expected early postsurgical changes from right sphenoid wing  meningioma resection with a small amount of extra-axial collection and  pneumocephalus in the right frontal lobe.    I reviewed these images today and agree with the interpretation.  It appears that the tumor was completely resected.    Today's exam remains stable.  Color vision and visual acuity is normal in both eyes.  The optic nerve head appears normal bilaterally.      Octopus automated 30 degree visual fields today show nonspecific very mild deficits in both eyes but fields are essentially full bilaterally.  OCT of the retinal nerve fiber layer is stable in both eyes as compared July 2020.  There is very mild superior retinal nerve fiber layer thinning in the right eye as seen preoperatively.    Follow-up in 6 months with me.     Complete documentation of historical and exam elements from today's encounter can be found in the full encounter summary report (not reduplicated in this progress note).  I personally obtained the chief complaint(s) and history of present illness.  I confirmed and edited as necessary the review of systems, past medical/surgical history, family history, social history, and examination findings as documented by others; and I examined the patient myself.  I personally reviewed the relevant tests, images, and reports as documented above.  I formulated and edited as necessary the assessment and plan and discussed the findings and management plan with the patient and family     Mikhail Hernández MD

## 2020-10-21 NOTE — LETTER
2020    RE: Neema Nixon  : 1955  MRN: [id number]    Dear Providers,    I saw our mutual patient, Neema Nixon, in follow-up in my clinic recently.  After a thorough neuro-ophthalmic history and examination, I came to the following conclusions:     1. 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  Doing very well after surgical resection by Dr. De Paz on 20.  Visual acuity and color vision remains normal in both eyes.  There remains no afferent pupillary defect.  OCT retinal nerve fiber layer shows no change in the right eye as compared to the presurgical baseline showing only very mild superior atrophy of the right nerve.  Formal visual fields in both eyes remain essentially full.  Patient has a follow-up scheduled with Dr. De Paz in November.  Follow-up with me in 6 months or sooner as needed.    Historical data:    2019 MRI brain with and without contrast:  IMPRESSION: Ovoid uniformly enhancing 1.4 cm extra-axial dural-based  mass positioned along the superior aspect of the right anterior  clinoid process, most likely representing a meningioma.    20 MRI brain with and without IV contrast   IMPRESSION: Interval increase in size of extra-axial enhancing mass  arising superiorly off the anterior clinoid process. The mass, which  most likely represents a meningioma, currently measures 1.7 cm in  greatest dimensions as compared to 1.4 cm on the prior study. There is  also some new edema in the adjacent brain parenchyma.     Interim history:    Last seen in 2020.    20 patient underwent surgery with Dr. De Paz:    1.  Right frontotemporal craniotomy for resection of intradural tumor.   2.  Right optic-clinoidal osteotomy for approach for the anterior fossa  3.  Use of intraoperative microscope.   4.  Use of  intraoperative Stealth neuronavigation.   5.  Insertion of lumbar drain.      Pathology showed a grade 1 meningioma.  Patient has follow-up with Dr. De Paz on 11/18/20.    Patient notes stable vision in both eyes since before neurosurgery.  No diplopia.    MRI brain and orbits with and without IV contrast 8/20/20:  Impression:  Expected early postsurgical changes from right sphenoid wing  meningioma resection with a small amount of extra-axial collection and  pneumocephalus in the right frontal lobe.    I reviewed these images today and agree with the interpretation.  It appears that the tumor was completely resected.    Today's exam remains stable.  Color vision and visual acuity is normal in both eyes.  The optic nerve head appears normal bilaterally.      Octopus automated 30 degree visual fields today show nonspecific very mild deficits in both eyes but fields are essentially full bilaterally.  OCT of the retinal nerve fiber layer is stable in both eyes as compared July 2020.  There is very mild superior retinal nerve fiber layer thinning in the right eye as seen preoperatively.    Follow-up in 6 months with me.      For further exam details, please feel free to contact our office for additional records.  If you wish to contact me regarding this patient please email me at [email or web address] or give my clinic a call to arrange a phone conversation.    Sincerely,    Mikhail Hernández MD  , Neuro-Ophthalmology and Adult Strabismus Surgery  The Jaclyn Rodrigues Chair in Neuro-Ophthalmology  Department of Ophthalmology and Visual Neurosciences  [facility]

## 2020-10-21 NOTE — NURSING NOTE
Chief Complaint(s) and History of Present Illness(es)     Follow Up     In right eye (Right clinioid region meningioma).  Associated symptoms include Negative for double vision.              Comments     Reports status post craniotomy since AYLEEN.     Notes stable vision each eye. Eye pain occasional. Headaches occasionally but could be related to the surgery.   No double vision.     ADRIANNA Irvin 10/21/2020 9:35 AM

## 2020-11-17 ENCOUNTER — ANCILLARY PROCEDURE (OUTPATIENT)
Dept: MRI IMAGING | Facility: CLINIC | Age: 65
End: 2020-11-17
Attending: PHYSICIAN ASSISTANT
Payer: COMMERCIAL

## 2020-11-17 DIAGNOSIS — D32.9 MENINGIOMA (H): ICD-10-CM

## 2020-11-17 PROCEDURE — 70553 MRI BRAIN STEM W/O & W/DYE: CPT | Mod: TC | Performed by: RADIOLOGY

## 2020-11-17 PROCEDURE — A9585 GADOBUTROL INJECTION: HCPCS | Performed by: RADIOLOGY

## 2020-11-17 RX ORDER — GADOBUTROL 604.72 MG/ML
10 INJECTION INTRAVENOUS ONCE
Status: COMPLETED | OUTPATIENT
Start: 2020-11-17 | End: 2020-11-17

## 2020-11-17 RX ADMIN — GADOBUTROL 8.5 ML: 604.72 INJECTION INTRAVENOUS at 10:20

## 2020-11-18 ENCOUNTER — OFFICE VISIT (OUTPATIENT)
Dept: NEUROSURGERY | Facility: CLINIC | Age: 65
End: 2020-11-18
Payer: COMMERCIAL

## 2020-11-18 VITALS
SYSTOLIC BLOOD PRESSURE: 151 MMHG | DIASTOLIC BLOOD PRESSURE: 83 MMHG | HEIGHT: 68 IN | RESPIRATION RATE: 16 BRPM | OXYGEN SATURATION: 96 % | HEART RATE: 71 BPM | WEIGHT: 186 LBS | BODY MASS INDEX: 28.19 KG/M2

## 2020-11-18 DIAGNOSIS — D32.9 MENINGIOMA (H): Primary | ICD-10-CM

## 2020-11-18 PROCEDURE — 99024 POSTOP FOLLOW-UP VISIT: CPT | Performed by: PHYSICIAN ASSISTANT

## 2020-11-18 ASSESSMENT — PAIN SCALES - GENERAL: PAINLEVEL: NO PAIN (0)

## 2020-11-18 ASSESSMENT — MIFFLIN-ST. JEOR: SCORE: 1427.19

## 2020-11-18 NOTE — LETTER
"2020       RE: Neema Nixon  [address]  [place] [address]     Dear Colleague,    Thank you for referring your patient, Neema Nixon, to the [facility] NEUROSURGERY CLINIC [place] at [facility]. Please see a copy of my visit note below.      [facility]    Name: Neema Nixon  MRN: [id number]  Age: 65 year old  : 2020      Chief Complaint:   Right clinoidal meningioma s/p resection 2020, 3 month follow up    History of Present Illness:   Neema Nixon is a pleasant 65 year old female with a history of diplopia and right vision loss who is here for her 3 month postop follow up. She underwent a right frontotemporal craniotomy for resection of her right anterior clinoidal meningioma on 2020. She is doing well. She no longer hears the \"dripping\" sound in her right ear. She notes an indentation over her right temple but otherwise has no complaints or concerns. She denies new fever, chills, headaches, vision or hearing changes, paresthesias, or weakness. She and her  just bought a place near the [facility] to be closer to family. [place] but are planning to stay to see how things go with covid.       Review of Systems:   Pertinent items are noted in HPI or as in patient entered ROS below, remainder of complete ROS is negative.     Physical Exam:   BP (!) 151/83   Pulse 71   Resp 16   Ht 1.727 m (5' 8\")   Wt 84.4 kg (186 lb)   SpO2 96%   BMI 28.28 kg/m    General: No acute distress.   Eyes: Conjunctivae are normal.  Resp: No respiratory distress.   MSK: Moves all extremities.  No obvious deformity.  Neuro: The patient is fully oriented and quite pleasant. Speech normal. Extraocular movements are intact without nystagmus. Facial sensation is intact in V1, V2, V3 distributions. Facial nerve function is normal, improvement in right eyebrow " raise since last visit. Palate is symmetric. Shoulders are full strength. There is no pronator drift. Full strength in all extremities. Sensation intact throughout. Gait is normal. She has a slight indentation of her right temple, discussed this is normal postoperative change.   Psych: Normal mood and affect. Behavior is normal.    Incision: well healed.     Imaging:  Brain MRI done 11/17/2020 reveals postoperative changes of right frontotemporal crani with enhancement in right temporal area. No residual tumor or evidence of recurrence.        Assessment:  Right anterior clinoidal meningioma s/p resection 8/20/2020, 3 month follow up    Plan:  She is doing well postoperatively. She has follow up with Dr. Hernández in May 2021. We will plan to see her back in 1 year with a new brain MRI w/wo prior to her visit. She was encouraged to call us with any new concerns or questions in the meantime.     Margarita Mooney PA-C

## 2020-11-18 NOTE — NURSING NOTE
Chief Complaint   Patient presents with     RECHECK     [facility] RETURN - 3 mo f/u, Meningioma     Dale Carlos

## 2020-11-18 NOTE — PATIENT INSTRUCTIONS
Follow up with us in 1 year, with brain MRI prior to your visit.  Please feel free to call us with any questions or concerns in the meantime.

## 2020-11-18 NOTE — PROGRESS NOTES
"  [facility]      Name: Neema Nixon  MRN: [id number]  Age: 65 year old  : 2020      Chief Complaint:   Right clinoidal meningioma s/p resection 2020, 3 month follow up    History of Present Illness:   Neema Nixon is a pleasant 65 year old female with a history of diplopia and right vision loss who is here for her 3 month postop follow up. She underwent a right frontotemporal craniotomy for resection of her right anterior clinoidal meningioma on 2020. She is doing well. She no longer hears the \"dripping\" sound in her right ear. She notes an indentation over her right temple but otherwise has no complaints or concerns. She denies new fever, chills, headaches, vision or hearing changes, paresthesias, or weakness. She and her  just bought a place near the [facility] to be closer to family. [place] but are planning to stay to see how things go with covid.       Review of Systems:   Pertinent items are noted in HPI or as in patient entered ROS below, remainder of complete ROS is negative.     Physical Exam:   BP (!) 151/83   Pulse 71   Resp 16   Ht 1.727 m (5' 8\")   Wt 84.4 kg (186 lb)   SpO2 96%   BMI 28.28 kg/m    General: No acute distress.   Eyes: Conjunctivae are normal.  Resp: No respiratory distress.   MSK: Moves all extremities.  No obvious deformity.  Neuro: The patient is fully oriented and quite pleasant. Speech normal. Extraocular movements are intact without nystagmus. Facial sensation is intact in V1, V2, V3 distributions. Facial nerve function is normal, improvement in right eyebrow raise since last visit. Palate is symmetric. Shoulders are full strength. There is no pronator drift. Full strength in all extremities. Sensation intact throughout. Gait is normal. She has a slight indentation of her right temple, discussed this is normal postoperative change.   Psych: Normal mood and affect. " Behavior is normal.    Incision: well healed.     Imaging:  Brain MRI done 11/17/2020 reveals postoperative changes of right frontotemporal crani with enhancement in right temporal area. No residual tumor or evidence of recurrence.        Assessment:  Right anterior clinoidal meningioma s/p resection 8/20/2020, 3 month follow up    Plan:  She is doing well postoperatively. She has follow up with Dr. Hernández in May 2021. We will plan to see her back in 1 year with a new brain MRI w/wo prior to her visit. She was encouraged to call us with any new concerns or questions in the meantime.       Margarita Mooney PA-C

## 2020-12-06 ENCOUNTER — HEALTH MAINTENANCE LETTER (OUTPATIENT)
Age: 65
End: 2020-12-06

## 2021-04-11 ENCOUNTER — HEALTH MAINTENANCE LETTER (OUTPATIENT)
Age: 66
End: 2021-04-11

## 2021-04-21 ASSESSMENT — ENCOUNTER SYMPTOMS
FEVER: 0
SHORTNESS OF BREATH: 0
CONSTIPATION: 0
SORE THROAT: 0
COUGH: 0
PALPITATIONS: 0
NAUSEA: 0
DYSURIA: 0
PARESTHESIAS: 0
BREAST MASS: 0
EYE PAIN: 0
JOINT SWELLING: 1
ABDOMINAL PAIN: 0
WEAKNESS: 0
NERVOUS/ANXIOUS: 0
HEARTBURN: 1
DIZZINESS: 0
DIARRHEA: 0
ARTHRALGIAS: 1
HEADACHES: 0
HEMATOCHEZIA: 0
HEMATURIA: 0
CHILLS: 0
MYALGIAS: 0
FREQUENCY: 0

## 2021-04-21 ASSESSMENT — ACTIVITIES OF DAILY LIVING (ADL): CURRENT_FUNCTION: NO ASSISTANCE NEEDED

## 2021-04-27 ENCOUNTER — ANCILLARY PROCEDURE (OUTPATIENT)
Dept: MAMMOGRAPHY | Facility: CLINIC | Age: 66
End: 2021-04-27
Payer: COMMERCIAL

## 2021-04-27 DIAGNOSIS — Z12.31 VISIT FOR SCREENING MAMMOGRAM: ICD-10-CM

## 2021-04-27 PROCEDURE — 77067 SCR MAMMO BI INCL CAD: CPT | Mod: TC | Performed by: RADIOLOGY

## 2021-04-27 ASSESSMENT — ENCOUNTER SYMPTOMS
NERVOUS/ANXIOUS: 0
HEADACHES: 0
DYSURIA: 0
SHORTNESS OF BREATH: 0
FREQUENCY: 0
CONSTIPATION: 0
DIZZINESS: 0
SORE THROAT: 0
MYALGIAS: 0
FEVER: 0
ABDOMINAL PAIN: 0
HEMATOCHEZIA: 0
CHILLS: 0
BREAST MASS: 0
JOINT SWELLING: 1
HEARTBURN: 1
EYE PAIN: 0
NAUSEA: 0
DIARRHEA: 0
COUGH: 0
HEMATURIA: 0
PARESTHESIAS: 0
PALPITATIONS: 0
WEAKNESS: 0
ARTHRALGIAS: 1

## 2021-04-27 ASSESSMENT — ACTIVITIES OF DAILY LIVING (ADL): CURRENT_FUNCTION: NO ASSISTANCE NEEDED

## 2021-04-27 NOTE — PATIENT INSTRUCTIONS
If you have not heard from the scheduling office for your colonoscopy within 2 business days, please call [phone].      Patient Education   Personalized Prevention Plan  You are due for the preventive services outlined below.  Your care team is available to assist you in scheduling these services.  If you have already completed any of these items, please share that information with your care team to update in your medical record.  Health Maintenance Due   Topic Date Due     Osteoporosis Screening  Never done     ANNUAL REVIEW OF HM ORDERS  Never done     Hepatitis C Screening  Never done     Diptheria Tetanus Pertussis (DTAP/TDAP/TD) Vaccine (1 - Tdap) 06/07/1978     Cholesterol Lab  Never done     Zoster (Shingles) Vaccine (1 of 2) Never done     Annual Wellness Visit  Never done     Pneumococcal Vaccine (1 of 1 - PPSV23) Never done     Flu Vaccine (1) 09/01/2020

## 2021-04-27 NOTE — PROGRESS NOTES
"SUBJECTIVE:   Neema Nixon is a 65 year old female who presents for Preventive Visit.      Patient has been advised of split billing requirements and indicates understanding: Yes   Are you in the first 12 months of your Medicare coverage?  No    Healthy Habits:     In general, how would you rate your overall health?  Good    Frequency of exercise:  2-3 days/week    Duration of exercise:  30-45 minutes    Do you usually eat at least 4 servings of fruit and vegetables a day, include whole grains    & fiber and avoid regularly eating high fat or \"junk\" foods?  No    Taking medications regularly:  Yes    Medication side effects:  Not applicable    Ability to successfully perform activities of daily living:  No assistance needed    Home Safety:  No safety concerns identified    Hearing Impairment:  No hearing concerns    In the past 6 months, have you been bothered by leaking of urine?  No    In general, how would you rate your overall mental or emotional health?  Excellent      PHQ-2 Total Score: 0    Additional concerns today:  No    Do you feel safe in your environment? Yes    Have you ever done Advance Care Planning? (For example, a Health Directive, POLST, or a discussion with a medical provider or your loved ones about your wishes): Yes, advance care planning is on file.       Fall risk  Fallen 2 or more times in the past year?: No  Any fall with injury in the past year?: No    Cognitive Screening   1) Repeat 3 items (Leader, Season, Table)    2) Clock draw: NORMAL  3) 3 item recall: Recalls 3 objects  Results: 3 items recalled: COGNITIVE IMPAIRMENT LESS LIKELY    Mini-CogTM Copyright GUY Rodriguez. Licensed by the author for use in [facility]; reprinted with permission ([email or web address]). All rights reserved.      Do you have sleep apnea, excessive snoring or daytime drowsiness?: no    Reviewed and updated as needed this visit by clinical staff  Tobacco  Allergies  Meds  Problems             Reviewed " and updated as needed this visit by Provider  Tobacco    Problems            Social History     Tobacco Use     Smoking status: Never Smoker     Smokeless tobacco: Never Used   Substance Use Topics     Alcohol use: Not Currently     Comment: minor     If you drink alcohol do you typically have >3 drinks per day or >7 drinks per week? No    Alcohol Use 4/28/2021   Prescreen: >3 drinks/day or >7 drinks/week? -   Prescreen: >3 drinks/day or >7 drinks/week? No         Has a h/o GERD.  Uses tums PRN for stomach acid.  She limits the acidic foods in her diet.   EGD done in the past showed an ulcer.        Colonoscopy done about 7 years ago in [place] and found to have polyps.  Repeat in 5 years advised.  No family history of colon cancer.     Had DEXA scan a couple years ago and had mild osteopenia.      She had a meningioma diagnosed in the last year and half and underwent surgery to remove this.  She was having double vision and decreased vision however that has resolved.  Still with mild decrease in vision on right.  Managed by ophthalmologist.     Current providers sharing in care for this patient include:   Patient Care Team:  Cherry Wood PA-C as PCP - General (Family Medicine)  Mikhail Hernández MD as MD (Ophthalmology)  Mikhail Hernández MD as Assigned Surgical Provider  Margarita Mooney PA-C as Assigned Neuroscience Provider    The following health maintenance items are reviewed in Epic and correct as of today:  Health Maintenance Due   Topic Date Due     DEXA  Never done     ZOSTER IMMUNIZATION (1 of 2) Never done     INFLUENZA VACCINE (1) 09/01/2020     Labs reviewed in EPIC  BP Readings from Last 3 Encounters:   04/28/21 136/67   11/18/20 (!) 151/83   09/02/20 110/75    Wt Readings from Last 3 Encounters:   04/28/21 86.9 kg (191 lb 9.6 oz)   11/18/20 84.4 kg (186 lb)   09/02/20 83.2 kg (183 lb 8 oz)                  Pneumonia Vaccine:Adults age 65+ who have not received previous  "Pneumovax (PPSV23) or PCV13 as an adult: Should first be given PCV13 AND then should be given PPSV23 6-12 months after PCV13 (due for pneumovax at next visit)  Mammogram Screening: Mammogram Screening: Recommended mammography every 1-2 years with patient discussion and risk factor consideration    Breast CA Risk Assessment (FHS-7) 4/21/2021   Do you have a family history of breast, colon, or ovarian cancer? No / Unknown         Mammogram Screening: Recommended mammography every 1-2 years with patient discussion and risk factor consideration  Pertinent mammograms are reviewed under the imaging tab.    Review of Systems   Constitutional: Negative for chills and fever.   HENT: Negative for congestion, ear pain, hearing loss and sore throat.    Eyes: Negative for pain and visual disturbance.   Respiratory: Negative for cough and shortness of breath.    Cardiovascular: Negative for chest pain, palpitations and peripheral edema.   Gastrointestinal: Positive for heartburn. Negative for abdominal pain, constipation, diarrhea, hematochezia and nausea.   Breasts:  Negative for tenderness, breast mass and discharge.   Genitourinary: Negative for dysuria, frequency, genital sores, hematuria, pelvic pain, urgency, vaginal bleeding and vaginal discharge.   Musculoskeletal: Positive for arthralgias and joint swelling. Negative for myalgias.   Skin: Negative for rash.   Neurological: Negative for dizziness, weakness, headaches and paresthesias.   Psychiatric/Behavioral: Negative for mood changes. The patient is not nervous/anxious.      Constitutional, HEENT, cardiovascular, pulmonary, gi and gu systems are negative, except as otherwise noted.    OBJECTIVE:   /67   Pulse 83   Temp 98  F (36.7  C) (Tympanic)   Ht 1.71 m (5' 7.32\")   Wt 86.9 kg (191 lb 9.6 oz)   SpO2 99%   BMI 29.72 kg/m   Estimated body mass index is 29.72 kg/m  as calculated from the following:    Height as of this encounter: 1.71 m (5' 7.32\").    " Weight as of this encounter: 86.9 kg (191 lb 9.6 oz).  Physical Exam  GENERAL APPEARANCE: healthy, alert and no distress  EYES: Eyes grossly normal to inspection, PERRL and conjunctivae and sclerae normal  HENT: ear canals and TM's normal, nose and mouth without ulcers or lesions, oropharynx clear and oral mucous membranes moist  NECK: no adenopathy, no asymmetry, masses, or scars and thyroid normal to palpation  RESP: lungs clear to auscultation - no rales, rhonchi or wheezes  BREAST: normal without masses, tenderness or nipple discharge and no palpable axillary masses or adenopathy  CV: regular rate and rhythm, normal S1 S2, no S3 or S4, no murmur, click or rub, no peripheral edema and peripheral pulses strong  ABDOMEN: soft, nontender, no hepatosplenomegaly, no masses and bowel sounds normal  MS: no musculoskeletal defects are noted and gait is age appropriate without ataxia  SKIN: no suspicious lesions or rashes  NEURO: Normal strength and tone, sensory exam grossly normal, mentation intact and speech normal  PSYCH: mentation appears normal and affect normal/bright    Diagnostic Test Results:  Labs reviewed in Epic    ASSESSMENT / PLAN:   1. Encounter for Medicare annual wellness exam       HEALTH CARE MAINTENANCE              Reviewed USPTF recommendations and anticipatory guidance.              See orders.   Mammo done recently, breast exam normal.   Due for pneumovax at next visit       2. Need for hepatitis C screening test  Discussed CDC guidelines on preventative screening for this condition.    Patient would like screening done.      - Hepatitis C Screen Reflex to HCV RNA Quant and Genotype    3. Screening for hyperlipidemia  Due for screening.   - Lipid panel reflex to direct LDL Fasting    4. Colon cancer screening  I discussed the importance of colorectal cancer screening, including the risks and benefits of the various procedures, including colonoscopy, cologuard and annual FOB immunoassay test.   "Patient was advised to do colonoscopy due to polyps noted 7 years ago.      - GASTROENTEROLOGY ADULT REF PROCEDURE ONLY; Future    5. Screening for diabetes mellitus  Will check fasting labs.   - Glucose    6. Osteopenia, unspecified location  Discussed the importance of Calcium and vitamin D intake, weight bearing exercise, avoidance of smoking and bone density monitoring (if appropriate).      7. Meningioma (H)  Managed by neurologist.     8. Gastroesophageal reflux disease with esophagitis without hemorrhage  Managed with diet, tums and tagamet as needed.       Patient has been advised of split billing requirements and indicates understanding: Yes  COUNSELING:  Reviewed preventive health counseling, as reflected in patient instructions       Regular exercise       Healthy diet/nutrition       Fall risk prevention       Osteoporosis prevention/bone health       Hepatitis C screening    Estimated body mass index is 29.72 kg/m  as calculated from the following:    Height as of this encounter: 1.71 m (5' 7.32\").    Weight as of this encounter: 86.9 kg (191 lb 9.6 oz).        She reports that she has never smoked. She has never used smokeless tobacco.      Appropriate preventive services were discussed with this patient, including applicable screening as appropriate for cardiovascular disease, diabetes, osteopenia/osteoporosis, and glaucoma.  As appropriate for age/gender, discussed screening for colorectal cancer, prostate cancer, breast cancer, and cervical cancer. Checklist reviewing preventive services available has been given to the patient.    Reviewed patients plan of care and provided an AVS. The Intermediate Care Plan ( asthma action plan, low back pain action plan, and migraine action plan) for Neema meets the Care Plan requirement. This Care Plan has been established and reviewed with the Patient.    Counseling Resources:  ATP IV Guidelines  Pooled Cohorts Equation Calculator  Breast Cancer Risk " Calculator  Breast Cancer: Medication to Reduce Risk  FRAX Risk Assessment  ICSI Preventive Guidelines  Dietary Guidelines for Americans, 2010  Timely's MyPlate  ASA Prophylaxis  Lung CA Screening    TIFFANY Tian [facility] PACO    Identified Health Risks:

## 2021-04-28 ENCOUNTER — OFFICE VISIT (OUTPATIENT)
Dept: FAMILY MEDICINE | Facility: CLINIC | Age: 66
End: 2021-04-28
Payer: COMMERCIAL

## 2021-04-28 VITALS
SYSTOLIC BLOOD PRESSURE: 136 MMHG | HEART RATE: 83 BPM | TEMPERATURE: 98 F | OXYGEN SATURATION: 99 % | WEIGHT: 191.6 LBS | BODY MASS INDEX: 30.07 KG/M2 | HEIGHT: 67 IN | DIASTOLIC BLOOD PRESSURE: 67 MMHG

## 2021-04-28 DIAGNOSIS — Z13.220 SCREENING FOR HYPERLIPIDEMIA: ICD-10-CM

## 2021-04-28 DIAGNOSIS — Z13.1 SCREENING FOR DIABETES MELLITUS: ICD-10-CM

## 2021-04-28 DIAGNOSIS — Z11.59 NEED FOR HEPATITIS C SCREENING TEST: ICD-10-CM

## 2021-04-28 DIAGNOSIS — K21.00 GASTROESOPHAGEAL REFLUX DISEASE WITH ESOPHAGITIS WITHOUT HEMORRHAGE: ICD-10-CM

## 2021-04-28 DIAGNOSIS — D32.9 MENINGIOMA (H): ICD-10-CM

## 2021-04-28 DIAGNOSIS — Z00.00 ENCOUNTER FOR MEDICARE ANNUAL WELLNESS EXAM: Primary | ICD-10-CM

## 2021-04-28 DIAGNOSIS — Z12.11 COLON CANCER SCREENING: ICD-10-CM

## 2021-04-28 DIAGNOSIS — M85.80 OSTEOPENIA, UNSPECIFIED LOCATION: ICD-10-CM

## 2021-04-28 PROBLEM — H53.2 DOUBLE VISION: Status: RESOLVED | Noted: 2019-10-23 | Resolved: 2021-04-28

## 2021-04-28 LAB
CHOLEST SERPL-MCNC: 164 MG/DL
GLUCOSE SERPL-MCNC: 95 MG/DL (ref 70–99)
HDLC SERPL-MCNC: 70 MG/DL
LDLC SERPL CALC-MCNC: 82 MG/DL
NONHDLC SERPL-MCNC: 94 MG/DL
TRIGL SERPL-MCNC: 62 MG/DL

## 2021-04-28 PROCEDURE — G0438 PPPS, INITIAL VISIT: HCPCS | Performed by: PHYSICIAN ASSISTANT

## 2021-04-28 PROCEDURE — 82947 ASSAY GLUCOSE BLOOD QUANT: CPT | Performed by: PHYSICIAN ASSISTANT

## 2021-04-28 PROCEDURE — 86803 HEPATITIS C AB TEST: CPT | Performed by: PHYSICIAN ASSISTANT

## 2021-04-28 PROCEDURE — 36415 COLL VENOUS BLD VENIPUNCTURE: CPT | Performed by: PHYSICIAN ASSISTANT

## 2021-04-28 PROCEDURE — 80061 LIPID PANEL: CPT | Performed by: PHYSICIAN ASSISTANT

## 2021-04-28 ASSESSMENT — MIFFLIN-ST. JEOR: SCORE: 1441.84

## 2021-04-29 LAB — HCV AB SERPL QL IA: NONREACTIVE

## 2021-05-12 ENCOUNTER — OFFICE VISIT (OUTPATIENT)
Dept: OPHTHALMOLOGY | Facility: CLINIC | Age: 66
End: 2021-05-12
Attending: OPHTHALMOLOGY
Payer: COMMERCIAL

## 2021-05-12 DIAGNOSIS — H53.10 SUBJECTIVE VISUAL DISTURBANCE: Primary | ICD-10-CM

## 2021-05-12 DIAGNOSIS — H53.40 VISUAL FIELD DEFECT: ICD-10-CM

## 2021-05-12 DIAGNOSIS — H47.20 PARTIAL OPTIC ATROPHY: ICD-10-CM

## 2021-05-12 PROCEDURE — G0463 HOSPITAL OUTPT CLINIC VISIT: HCPCS

## 2021-05-12 PROCEDURE — 92083 EXTENDED VISUAL FIELD XM: CPT | Performed by: OPHTHALMOLOGY

## 2021-05-12 PROCEDURE — 92133 CPTRZD OPH DX IMG PST SGM ON: CPT | Performed by: OPHTHALMOLOGY

## 2021-05-12 PROCEDURE — 99214 OFFICE O/P EST MOD 30 MIN: CPT | Performed by: OPHTHALMOLOGY

## 2021-05-12 ASSESSMENT — REFRACTION_WEARINGRX
OS_AXIS: 038
OS_SPHERE: -12.25
OD_CYLINDER: SPHERE
OD_SPHERE: -11.00
OS_CYLINDER: +1.25

## 2021-05-12 ASSESSMENT — VISUAL ACUITY
OD_CC+: -2
CORRECTION_TYPE: GLASSES
METHOD: SNELLEN - LINEAR
OS_CC+: -3
OD_CC: 20/25
OS_CC: 20/25

## 2021-05-12 ASSESSMENT — SLIT LAMP EXAM - LIDS
COMMENTS: MGD
COMMENTS: MGD

## 2021-05-12 ASSESSMENT — TONOMETRY
OD_IOP_MMHG: 14
IOP_METHOD: ICARE
OS_IOP_MMHG: 12

## 2021-05-12 ASSESSMENT — CUP TO DISC RATIO
OS_RATIO: 0.5
OD_RATIO: 0.5

## 2021-05-12 ASSESSMENT — CONF VISUAL FIELD: COMMENTS: VF TODAY

## 2021-05-12 ASSESSMENT — EXTERNAL EXAM - RIGHT EYE: OD_EXAM: NORMAL

## 2021-05-12 ASSESSMENT — EXTERNAL EXAM - LEFT EYE: OS_EXAM: NORMAL

## 2021-05-12 NOTE — PROGRESS NOTES
1. 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   Doing very well after surgical resection by Dr. De Paz on 8/20/20 with most recent MRI in 11/2020 showing no recurrence or residual tumor.      Visual acuity and color vision remains normal in both eyes.  There remains no afferent pupillary defect.  OCT retinal nerve fiber layer in the right eye today shows mild thinning (post surgical / post decompression) since last visit and expect today's OCT is the new normal baseline.  Formal visual fields in both eyes remain essentially full.  Follow-up with me in 3 months due to partial macular hole- OCT retina.  Follow-up visual field and retinal nerve fiber layer OCT in 6 months    2. 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 Vision stable and still essentially normal and patient asymptomatic.  Observe for now.     Historical data:    08/2019 MRI brain with and without contrast:  IMPRESSION: Ovoid uniformly enhancing 1.4 cm extra-axial dural-based  mass positioned along the superior aspect of the right anterior  clinoid process, most likely representing a meningioma.    06/24/20 MRI brain with and without IV contrast   IMPRESSION: Interval increase in size of extra-axial enhancing mass  arising superiorly off the anterior clinoid process. The mass, which  most likely represents a meningioma, currently measures 1.7 cm in  greatest dimensions as compared to 1.4 cm on the prior study. There is  also some new edema in the adjacent brain parenchyma.     Interim history:    Last seen in July 2020.    8/20/20 patient underwent surgery with Dr. De Paz:    1.  Right frontotemporal craniotomy for resection of intradural tumor.   2.  Right optic-clinoidal osteotomy for approach for the anterior fossa  3.  Use of intraoperative microscope.   4.  Use of intraoperative Stealth neuronavigation.    5.  Insertion of lumbar drain.      Pathology showed a grade 1 meningioma.  Patient has follow-up with Dr. De Paz on 20.    Patient notes stable vision in both eyes since before neurosurgery.  No   diplopia.    MRI brain and orbits with and without IV contrast 20:  Impression:  Expected early postsurgical changes from right sphenoid wing  meningioma resection with a small amount of extra-axial collection and  pneumocephalus in the right frontal lobe.    Interim history and data  Last visit: 10/21/2020     Patient returned from [facility] in [place].  No changes noticed in vision since last visit.     Most recent MRI 2020 and read by radiology as showin. Postoperative changes. Right frontal-temporal craniotomy. Enhancing  tissue in the right temporal and infratemporal fossa.  2. No evidence for any residual or recurrent tumor in the region of  the right anterior clinoid process.    Next MRI scheduled for 2021 ordered by Dr. De Paz    Today vision function stable    Octopus automated 30 degree visual field essentially full in both eyes and stable.    OCT of the optic nerve head revealed mild increased thinning superior more than inferior compared to last OCT that appears to be a post surgical effect.    Follow-up with me in 3 months         35 minutes were spent on the date of the encounter by me doing chart review, history and exam, documentation, and further activities as noted above    Complete documentation of historical and exam elements from today's encounter can be found in the full encounter summary report (not reduplicated in this progress note).  I personally obtained the chief complaint(s) and history of present illness.  I confirmed and edited as necessary the review of systems, past medical/surgical history, family history, social history, and examination findings as documented by others; and I examined the patient myself.  I personally reviewed the relevant tests,  images, and reports as documented above.  I formulated and edited as necessary the assessment and plan and discussed the findings and management plan with the patient and family     Mikhail Hernández MD

## 2021-05-12 NOTE — LETTER
May 12, 2021    RE: Neema Nixon  : 1955  MRN: [id number]    Dear Providers,    I saw our mutual patient, Neema Nixon, in follow-up in my clinic recently.  After a thorough neuro-ophthalmic history and examination, I came to the following conclusions:     1. 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   Doing very well after surgical resection by Dr. De Paz on 20 with most recent MRI in 2020 showing no recurrence or residual tumor.      Visual acuity and color vision remains normal in both eyes.  There remains no afferent pupillary defect.  OCT retinal nerve fiber layer in the right eye today shows mild thinning (post surgical / post decompression) since last visit and expect today's OCT is the new normal baseline.  Formal visual fields in both eyes remain essentially full.  Follow-up with me in 3 months due to partial macular hole- OCT retina.  Follow-up visual field and retinal nerve fiber layer OCT in 6 months    2. 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 Vision stable and still essentially normal and patient asymptomatic.  Observe for now.     Historical data:    2019 MRI brain with and without contrast:  IMPRESSION: Ovoid uniformly enhancing 1.4 cm extra-axial dural-based mass positioned along the superior aspect of the right anterior clinoid process, most likely representing a meningioma.    20 MRI brain with and without IV contrast   IMPRESSION: Interval increase in size of extra-axial enhancing mass arising superiorly off the anterior clinoid process. The mass, which most likely represents a meningioma, currently measures 1.7 cm in  greatest dimensions as compared to 1.4 cm on the prior study. There is also some new edema in the adjacent brain parenchyma.     Interim history:    Last seen in 2020.    20 patient underwent surgery  with Dr. De Paz:    1.  Right frontotemporal craniotomy for resection of intradural tumor.   2.  Right optic-clinoidal osteotomy for approach for the anterior fossa  3.  Use of intraoperative microscope.   4.  Use of intraoperative Stealth neuronavigation.   5.  Insertion of lumbar drain.      Pathology showed a grade 1 meningioma.  Patient has follow-up with Dr. De Paz on 20.    Patient notes stable vision in both eyes since before neurosurgery.  No diplopia.    MRI brain and orbits with and without IV contrast 20:  Impression:  Expected early postsurgical changes from right sphenoid wing  meningioma resection with a small amount of extra-axial collection and  pneumocephalus in the right frontal lobe.    Interim history and data  Last visit: 10/21/2020     Patient returned from [facility] in [place].  No changes noticed in vision since last visit.     Most recent MRI 2020 and read by radiology as showin. Postoperative changes. Right frontal-temporal craniotomy. Enhancing  tissue in the right temporal and infratemporal fossa.  2. No evidence for any residual or recurrent tumor in the region of  the right anterior clinoid process.    Next MRI scheduled for 2021 ordered by Dr. De Paz    Today vision function stable    Octopus automated 30 degree visual field essentially full in both eyes and stable.    OCT of the optic nerve head revealed mild increased thinning superior more than inferior compared to last OCT that appears to be a post surgical effect.    Follow-up with me in 3 months      For further exam details, please feel free to contact our office for additional records.  If you wish to contact me regarding this patient please email me at [email or web address] or give my clinic a call to arrange a phone conversation.    Sincerely,    Mikhail Hernández MD  , Neuro-Ophthalmology and Adult Strabismus Surgery  The Jaclyn Rodrigues Chair in  Neuro-Ophthalmology  Department of Ophthalmology and Visual Neurosciences  [facility]

## 2021-05-12 NOTE — NURSING NOTE
Chief Complaints and History of Present Illnesses   Patient presents with     Follow Up      Chief Complaint(s) and History of Present Illness(es)     Follow Up     Laterality: both eyes    Associated symptoms: Negative for eye pain and double vision              Comments     Follow up of Right clinioid region meningioma.  No changes in vision either eye since last exam.  Eye meds: AT's each eye  ADRIANNA Merritt 5/12/2021 10:51 AM

## 2021-06-22 DIAGNOSIS — Z11.59 ENCOUNTER FOR SCREENING FOR OTHER VIRAL DISEASES: ICD-10-CM

## 2021-07-06 ENCOUNTER — TELEPHONE (OUTPATIENT)
Dept: FAMILY MEDICINE | Facility: CLINIC | Age: 66
End: 2021-07-06

## 2021-07-06 NOTE — TELEPHONE ENCOUNTER
Patient Quality Outreach      Summary:    Patient has the following on her problem list/HM: None    Patient is due/failing the following:   Colonoscopy    Type of outreach:    Not needed, appt set for 7/27/21    Questions for provider review:    None                                                                                                                                     Qi Barnard MA       Chart routed to none.

## 2021-07-20 RX ORDER — SODIUM, POTASSIUM,MAG SULFATES 17.5-3.13G
2 SOLUTION, RECONSTITUTED, ORAL ORAL SEE ADMIN INSTRUCTIONS
Qty: 354 ML | Refills: 0 | Status: SHIPPED | OUTPATIENT
Start: 2021-07-20 | End: 2021-10-15

## 2021-07-20 RX ORDER — BISACODYL 5 MG/1
15 TABLET, DELAYED RELEASE ORAL SEE ADMIN INSTRUCTIONS
Qty: 3 TABLET | Refills: 0 | Status: SHIPPED | OUTPATIENT
Start: 2021-07-20 | End: 2021-10-15

## 2021-07-23 ENCOUNTER — LAB (OUTPATIENT)
Dept: LAB | Facility: CLINIC | Age: 66
End: 2021-07-23
Payer: COMMERCIAL

## 2021-07-23 DIAGNOSIS — Z11.59 ENCOUNTER FOR SCREENING FOR OTHER VIRAL DISEASES: ICD-10-CM

## 2021-07-23 PROCEDURE — U0003 INFECTIOUS AGENT DETECTION BY NUCLEIC ACID (DNA OR RNA); SEVERE ACUTE RESPIRATORY SYNDROME CORONAVIRUS 2 (SARS-COV-2) (CORONAVIRUS DISEASE [COVID-19]), AMPLIFIED PROBE TECHNIQUE, MAKING USE OF HIGH THROUGHPUT TECHNOLOGIES AS DESCRIBED BY CMS-2020-01-R: HCPCS

## 2021-07-23 PROCEDURE — U0005 INFEC AGEN DETEC AMPLI PROBE: HCPCS

## 2021-07-24 LAB — SARS-COV-2 RNA RESP QL NAA+PROBE: NEGATIVE

## 2021-07-27 ENCOUNTER — HOSPITAL ENCOUNTER (OUTPATIENT)
Facility: AMBULATORY SURGERY CENTER | Age: 66
Discharge: HOME | End: 2021-07-27
Attending: INTERNAL MEDICINE | Admitting: INTERNAL MEDICINE
Payer: COMMERCIAL

## 2021-07-27 VITALS
SYSTOLIC BLOOD PRESSURE: 116 MMHG | TEMPERATURE: 97.1 F | HEART RATE: 55 BPM | RESPIRATION RATE: 18 BRPM | DIASTOLIC BLOOD PRESSURE: 70 MMHG | OXYGEN SATURATION: 99 %

## 2021-07-27 DIAGNOSIS — Z12.11 SCREEN FOR COLON CANCER: Primary | ICD-10-CM

## 2021-07-27 LAB — COLONOSCOPY: NORMAL

## 2021-07-27 PROCEDURE — G8907 PT DOC NO EVENTS ON DISCHARG: HCPCS

## 2021-07-27 PROCEDURE — G0121 COLON CA SCRN NOT HI RSK IND: HCPCS

## 2021-07-27 PROCEDURE — G8918 PT W/O PREOP ORDER IV AB PRO: HCPCS

## 2021-07-27 RX ORDER — NALOXONE HYDROCHLORIDE 0.4 MG/ML
0.4 INJECTION, SOLUTION INTRAMUSCULAR; INTRAVENOUS; SUBCUTANEOUS
Status: DISCONTINUED | OUTPATIENT
Start: 2021-07-27 | End: 2021-07-28 | Stop reason: HOSPADM

## 2021-07-27 RX ORDER — FENTANYL CITRATE 50 UG/ML
INJECTION, SOLUTION INTRAMUSCULAR; INTRAVENOUS PRN
Status: DISCONTINUED | OUTPATIENT
Start: 2021-07-27 | End: 2021-07-27 | Stop reason: HOSPADM

## 2021-07-27 RX ORDER — NALOXONE HYDROCHLORIDE 0.4 MG/ML
0.2 INJECTION, SOLUTION INTRAMUSCULAR; INTRAVENOUS; SUBCUTANEOUS
Status: DISCONTINUED | OUTPATIENT
Start: 2021-07-27 | End: 2021-07-28 | Stop reason: HOSPADM

## 2021-07-27 RX ORDER — FLUMAZENIL 0.1 MG/ML
0.2 INJECTION, SOLUTION INTRAVENOUS
Status: ACTIVE | OUTPATIENT
Start: 2021-07-27 | End: 2021-07-27

## 2021-07-27 RX ORDER — ONDANSETRON 4 MG/1
4 TABLET, ORALLY DISINTEGRATING ORAL EVERY 6 HOURS PRN
Status: DISCONTINUED | OUTPATIENT
Start: 2021-07-27 | End: 2021-07-28 | Stop reason: HOSPADM

## 2021-07-27 RX ORDER — LIDOCAINE 40 MG/G
CREAM TOPICAL
Status: DISCONTINUED | OUTPATIENT
Start: 2021-07-27 | End: 2021-07-28 | Stop reason: HOSPADM

## 2021-07-27 RX ORDER — PROCHLORPERAZINE MALEATE 5 MG
5 TABLET ORAL EVERY 6 HOURS PRN
Status: DISCONTINUED | OUTPATIENT
Start: 2021-07-27 | End: 2021-07-28 | Stop reason: HOSPADM

## 2021-07-27 RX ORDER — ONDANSETRON 2 MG/ML
4 INJECTION INTRAMUSCULAR; INTRAVENOUS
Status: DISCONTINUED | OUTPATIENT
Start: 2021-07-27 | End: 2021-07-28 | Stop reason: HOSPADM

## 2021-07-27 RX ORDER — ONDANSETRON 2 MG/ML
4 INJECTION INTRAMUSCULAR; INTRAVENOUS EVERY 6 HOURS PRN
Status: DISCONTINUED | OUTPATIENT
Start: 2021-07-27 | End: 2021-07-28 | Stop reason: HOSPADM

## 2021-07-28 ENCOUNTER — OFFICE VISIT (OUTPATIENT)
Dept: OPHTHALMOLOGY | Facility: CLINIC | Age: 66
End: 2021-07-28
Attending: OPHTHALMOLOGY
Payer: COMMERCIAL

## 2021-07-28 DIAGNOSIS — H53.40 VISUAL FIELD DEFECT: Primary | ICD-10-CM

## 2021-07-28 DIAGNOSIS — H35.341 MACULAR HOLE OF RIGHT EYE: ICD-10-CM

## 2021-07-28 DIAGNOSIS — H47.20 PARTIAL OPTIC ATROPHY: ICD-10-CM

## 2021-07-28 PROCEDURE — 92133 CPTRZD OPH DX IMG PST SGM ON: CPT | Performed by: OPHTHALMOLOGY

## 2021-07-28 PROCEDURE — 92014 COMPRE OPH EXAM EST PT 1/>: CPT | Mod: GC | Performed by: OPHTHALMOLOGY

## 2021-07-28 PROCEDURE — G0463 HOSPITAL OUTPT CLINIC VISIT: HCPCS

## 2021-07-28 ASSESSMENT — SLIT LAMP EXAM - LIDS
COMMENTS: MGD
COMMENTS: MGD

## 2021-07-28 ASSESSMENT — TONOMETRY
OS_IOP_MMHG: 14
IOP_METHOD: TONOPEN
OD_IOP_MMHG: 12

## 2021-07-28 ASSESSMENT — CUP TO DISC RATIO
OD_RATIO: 0.5
OS_RATIO: 0.5

## 2021-07-28 ASSESSMENT — CONF VISUAL FIELD
OD_NORMAL: 1
OS_NORMAL: 1
METHOD: COUNTING FINGERS

## 2021-07-28 ASSESSMENT — REFRACTION_WEARINGRX
OS_AXIS: 038
OS_SPHERE: -12.25
OS_CYLINDER: +1.25
OD_SPHERE: -11.00
OD_CYLINDER: SPHERE

## 2021-07-28 ASSESSMENT — VISUAL ACUITY
CORRECTION_TYPE: GLASSES
METHOD: SNELLEN - LINEAR
OD_CC+: +2
OD_CC: 20/25
OS_CC: 20/25

## 2021-07-28 ASSESSMENT — EXTERNAL EXAM - LEFT EYE: OS_EXAM: NORMAL

## 2021-07-28 ASSESSMENT — EXTERNAL EXAM - RIGHT EYE: OD_EXAM: NORMAL

## 2021-07-28 NOTE — PROGRESS NOTES
1. 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   Doing very well after surgical resection by Dr. De Paz on 8/20/20 with most recent MRI in 11/2020 showing no recurrence or residual tumor.      Visual acuity and color vision remains normal in both eyes.  There remains no afferent pupillary defect.  OCT retinal nerve fiber layer in the right eye today shows mild thinning (post surgical / post decompression) since October visit and expect May's and today's OCT is the new normal baseline, stable OCT today.  Formal visual fields in both eyes remain essentially full. Follow-up visual field and retinal nerve fiber layer OCT in 6 months.    2. 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 Vision improved compared to prior and still essentially normal and patient asymptomatic.  OCT improved compared to prior. Observe for now.     Historical data:    08/2019 MRI brain with and without contrast:  IMPRESSION: Ovoid uniformly enhancing 1.4 cm extra-axial dural-based  mass positioned along the superior aspect of the right anterior  clinoid process, most likely representing a meningioma.    06/24/20 MRI brain with and without IV contrast   IMPRESSION: Interval increase in size of extra-axial enhancing mass  arising superiorly off the anterior clinoid process. The mass, which  most likely represents a meningioma, currently measures 1.7 cm in  greatest dimensions as compared to 1.4 cm on the prior study. There is  also some new edema in the adjacent brain parenchyma.     8/20/20:MRI brain and orbits with and without IV contrast   Impression:  Expected early postsurgical changes from right sphenoid wing  meningioma resection with a small amount of extra-axial collection and  pneumocephalus in the right frontal lobe.    8/20/20 patient underwent surgery with Dr. De Paz:    1.  Right  frontotemporal craniotomy for resection of intradural tumor.   2.  Right optic-clinoidal osteotomy for approach for the anterior fossa  3.  Use of intraoperative microscope.   4.  Use of intraoperative Stealth neuronavigation.   5.  Insertion of lumbar drain.      Pathology showed a grade 1 meningioma.  Patient has follow-up with Dr. De Paz on 20.    Most recent MRI 2020 and read by radiology as showin. Postoperative changes. Right frontal-temporal craniotomy. Enhancing  tissue in the right temporal and infratemporal fossa.  2. No evidence for any residual or recurrent tumor in the region of  the right anterior clinoid process.    Interim history and data  Last visit: 21     Since last visit, she notes improvement in central small blurred spot in the left eye. Otherwise, no changes noticed in vision since last visit. No recent headaches, double vision, eye pain, nausea/vomiting. Next MRI scheduled for 2021 ordered by Dr. De Paz    Today vision function stable    Octopus automated 30 degree visual field (2021) essentially full in both eyes and stable.    OCT macula shows improved partial macular hole today compared to prior.    OCT of the optic nerve head reveals mild increased thinning superior more than inferior in the right eye- stable compared to last OCT.  OCT of the optic nerve head revealed in the left eye shows normal compared to age-matched controls and stable retinal nerve fiber layer.    Follow-up with me in 6 months.            Complete documentation of historical and exam elements from today's encounter can be found in the full encounter summary report (not reduplicated in this progress note).  I personally obtained the chief complaint(s) and history of present illness.  I confirmed and edited as necessary the review of systems, past medical/surgical history, family history, social history, and examination findings as documented by others; and I examined the  patient myself.  I personally reviewed the relevant tests, images, and reports as documented above.  I formulated and edited as necessary the assessment and plan and discussed the findings and management plan with the patient and family.  I personally reviewed the ophthalmic test(s) associated with this encounter, agree with the interpretation(s) as documented by the resident/fellow, and have edited the corresponding report(s) as necessary.     MD Adri Guidry MD  Ophthalmology Resident, PGY-3

## 2021-07-28 NOTE — LETTER
2021    RE: Neema Nixon  : 1955  MRN: [id number]    Dear Providers,    I saw our mutual patient, Neema Nixon, in follow-up in my clinic recently.  After a thorough neuro-ophthalmic history and examination, I came to the following conclusions:     1. 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   Doing very well after surgical resection by Dr. De Paz on 20 with most recent MRI in 2020 showing no recurrence or residual tumor.      Visual acuity and color vision remains normal in both eyes.  There remains no afferent pupillary defect.  OCT retinal nerve fiber layer in the right eye today shows mild thinning (post surgical / post decompression) since October visit and expect May's and today's OCT is the new normal baseline, stable OCT today.  Formal visual fields in both eyes remain essentially full. Follow-up visual field and retinal nerve fiber layer OCT in 6 months.    2. 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 Vision improved compared to prior and still essentially normal and patient asymptomatic.  OCT improved compared to prior. Observe for now.     Historical data:  2019 MRI brain with and without contrast:  IMPRESSION: Ovoid uniformly enhancing 1.4 cm extra-axial dural-based  mass positioned along the superior aspect of the right anterior  clinoid process, most likely representing a meningioma.    20 MRI brain with and without IV contrast   IMPRESSION: Interval increase in size of extra-axial enhancing mass  arising superiorly off the anterior clinoid process. The mass, which  most likely represents a meningioma, currently measures 1.7 cm in  greatest dimensions as compared to 1.4 cm on the prior study. There is  also some new edema in the adjacent brain parenchyma.     20:MRI brain and orbits with and without IV contrast    Impression:  Expected early postsurgical changes from right sphenoid wing  meningioma resection with a small amount of extra-axial collection and  pneumocephalus in the right frontal lobe.    20 patient underwent surgery with Dr. De Paz:    1.  Right frontotemporal craniotomy for resection of intradural tumor.   2.  Right optic-clinoidal osteotomy for approach for the anterior fossa  3.  Use of intraoperative microscope.   4.  Use of intraoperative Stealth neuronavigation.   5.  Insertion of lumbar drain.      Pathology showed a grade 1 meningioma.  Patient has follow-up with Dr. De Paz on 20.    Most recent MRI 2020 and read by radiology as showin. Postoperative changes. Right frontal-temporal craniotomy. Enhancing  tissue in the right temporal and infratemporal fossa.  2. No evidence for any residual or recurrent tumor in the region of  the right anterior clinoid process.    Interim history and data  Last visit: 21     Since last visit, she notes improvement in central small blurred spot in the left eye. Otherwise, no changes noticed in vision since last visit. No recent headaches, double vision, eye pain, nausea/vomiting. Next MRI scheduled for 2021 ordered by Dr. De Paz    Today vision function stable    Octopus automated 30 degree visual field (2021) essentially full in both eyes and stable.    OCT macula shows improved partial macular hole today compared to prior.    OCT of the optic nerve head reveals mild increased thinning superior more than inferior in the right eye- stable compared to last OCT.  OCT of the optic nerve head revealed in the left eye shows normal compared to age-matched controls and stable retinal nerve fiber layer.    Follow-up with me in 6 months.     For further exam details, please feel free to contact our office for additional records.  If you wish to contact me regarding this patient please email me at [facility] [email or web address] or give my clinic a  call to arrange a phone conversation.    Sincerely,    Mikhail Hernández MD  , Neuro-Ophthalmology and Adult Strabismus Surgery  The Jaclyn Rodrigues Chair in Neuro-Ophthalmology  Department of Ophthalmology and Visual Neurosciences  [facility]

## 2021-07-28 NOTE — NURSING NOTE
Chief Complaints and History of Present Illnesses   Patient presents with     Follow Up     3 month follow up Right clinioid region meningioma     Chief Complaint(s) and History of Present Illness(es)     Follow Up     Comments: 3 month follow up Right clinioid region meningioma              Comments     Pt states vision is the same as last visit. No eye pain today. No new flashes or floaters.  Pt notes allergies bothering both of her eyes, pt not using any allergy eye drops.    BOOGIE Belle July 28, 2021 12:50 PM

## 2021-09-24 ENCOUNTER — TELEPHONE (OUTPATIENT)
Dept: NEUROSURGERY | Facility: CLINIC | Age: 66
End: 2021-09-24

## 2021-09-26 ENCOUNTER — HEALTH MAINTENANCE LETTER (OUTPATIENT)
Age: 66
End: 2021-09-26

## 2021-10-08 ENCOUNTER — ANCILLARY PROCEDURE (OUTPATIENT)
Dept: MRI IMAGING | Facility: CLINIC | Age: 66
End: 2021-10-08
Attending: PHYSICIAN ASSISTANT
Payer: COMMERCIAL

## 2021-10-08 DIAGNOSIS — D32.9 MENINGIOMA (H): ICD-10-CM

## 2021-10-08 PROCEDURE — A9585 GADOBUTROL INJECTION: HCPCS | Mod: JW | Performed by: RADIOLOGY

## 2021-10-08 PROCEDURE — 70553 MRI BRAIN STEM W/O & W/DYE: CPT | Mod: TC | Performed by: RADIOLOGY

## 2021-10-08 RX ORDER — GADOBUTROL 604.72 MG/ML
0.1 INJECTION INTRAVENOUS ONCE
Status: COMPLETED | OUTPATIENT
Start: 2021-10-08 | End: 2021-10-08

## 2021-10-08 RX ADMIN — GADOBUTROL 8.5 ML: 604.72 INJECTION INTRAVENOUS at 09:41

## 2021-10-15 ENCOUNTER — VIRTUAL VISIT (OUTPATIENT)
Dept: NEUROSURGERY | Facility: CLINIC | Age: 66
End: 2021-10-15
Payer: COMMERCIAL

## 2021-10-15 DIAGNOSIS — D32.9 MENINGIOMA (H): Primary | ICD-10-CM

## 2021-10-15 PROCEDURE — 99441 PR PHYSICIAN TELEPHONE EVALUATION 5-10 MIN: CPT | Performed by: PHYSICIAN ASSISTANT

## 2021-10-15 NOTE — PROGRESS NOTES
Neema is a 66 year old who is being evaluated via a billable video visit.      How would you like to obtain your AVS? [employer]  If the video visit is dropped, the invitation should be resent by:[phone]    Neema is a 66 year old who is being evaluated via a billable telephone visit.      What phone number would you like to be contacted at? [phone]  How would you like to obtain your AVS? [email or web address]  Phone call duration: 5 minutes    [facility]  Department of Neurosurgery    Name: Neema Nixon  MRN: [id number]  Age: 66 year old  : 1955  10/15/2021      Chief Complaint:   Right clinoidal meningioma s/p resection 2020, follow up visit    History of Present Illness:   Neema Nixon is a 66 year old female with a history of diplopia and right vision loss who is here for an annual follow up. She underwent a right frontotemporal craniotomy for resection of her right anterior clinoidal meningioma on 2020. She feels well. She denies new headaches, vision changes, paresthesias, or weakness. She had an MRI done recently to observe the resection cavity. She continues to follow with Dr. Hernández, he's watching a macular hole in the right eye and will see her q6 months for now. Subjectively she feels her vision is stable.    Review of Systems:   Pertinent items are noted in HPI or as in patient entered ROS below, remainder of complete ROS is negative.     Physical Exam:   Limited by telephone visit.  Speech is normal. Answers questions appropriately.    Imaging:  We reviewed the MRI done 10/8/2021 which shows no evidence of tumor recurrence at the resection area. No FLAIR or new enhancements seen elsewhere.     Assessment:  Right clinoidal meningioma s/p resection 2020, follow up visit    Plan:  We reviewed the reassuring imaging findings. We'll plan to followup in 1 year with a new MRI prior to visit. She was encouraged to reach out with new questions or concerns in the meantime.       Margarita Mooney PA-C  Department of Neurosurgery

## 2021-10-15 NOTE — LETTER
10/15/2021       RE: Neema Nixon  [address]  [facility] [address]     Dear Colleague,    Thank you for referring your patient, Neema Nixon, to the [facility]UROS[facility] [place] at [facility]. Please see a copy of my visit note below.    [facility]  Department of Neurosurgery    Name: Neema Nixon  MRN: [id number]  Age: 66 year old  : 1955  10/15/2021      Chief Complaint:   Right clinoidal meningioma s/p resection 2020, follow up visit    History of Present Illness:   Neema Nixon is a 66 year old female with a history of diplopia and right vision loss who is here for an annual follow up. She underwent a right frontotemporal craniotomy for resection of her right anterior clinoidal meningioma on 2020. She feels well. She denies new headaches, vision changes, paresthesias, or weakness. She had an MRI done recently to observe the resection cavity. She continues to follow with Dr. Hernández, he's watching a macular hole in the right eye and will see her q6 months for now. Subjectively she feels her vision is stable.    Review of Systems:   Pertinent items are noted in HPI or as in patient entered ROS below, remainder of complete ROS is negative.     Physical Exam:   Limited by telephone visit.  Speech is normal. Answers questions appropriately.    Imaging:  We reviewed the MRI done 10/8/2021 which shows no evidence of tumor recurrence at the resection area. No FLAIR or new enhancements seen elsewhere.     Assessment:  Right clinoidal meningioma s/p resection 2020, follow up visit    Plan:  We reviewed the reassuring imaging findings. We'll plan to followup in 1 year with a new MRI prior to visit. She was encouraged to reach out with new questions or concerns in the meantime.      Margarita Mooney PA-C  Department of Neurosurgery            Again, thank you for allowing me to participate  in the care of your patient.      Sincerely,    Margarita Mooney PA-C

## 2021-10-15 NOTE — PATIENT INSTRUCTIONS
Follow up in 1 year with MRI prior to visit.    Please feel free to reach out with any new questions or concerns in the meantime.

## 2022-02-14 ENCOUNTER — OFFICE VISIT (OUTPATIENT)
Dept: FAMILY MEDICINE | Facility: CLINIC | Age: 67
End: 2022-02-14
Payer: COMMERCIAL

## 2022-02-14 VITALS
TEMPERATURE: 97.6 F | BODY MASS INDEX: 32.08 KG/M2 | SYSTOLIC BLOOD PRESSURE: 130 MMHG | DIASTOLIC BLOOD PRESSURE: 84 MMHG | HEART RATE: 89 BPM | HEIGHT: 67 IN | WEIGHT: 204.38 LBS | OXYGEN SATURATION: 98 % | RESPIRATION RATE: 14 BRPM

## 2022-02-14 DIAGNOSIS — M54.6 ACUTE THORACIC BACK PAIN, UNSPECIFIED BACK PAIN LATERALITY: ICD-10-CM

## 2022-02-14 DIAGNOSIS — R07.89 CHEST PRESSURE: ICD-10-CM

## 2022-02-14 DIAGNOSIS — K21.00 GASTROESOPHAGEAL REFLUX DISEASE WITH ESOPHAGITIS, UNSPECIFIED WHETHER HEMORRHAGE: Primary | ICD-10-CM

## 2022-02-14 PROCEDURE — 93000 ELECTROCARDIOGRAM COMPLETE: CPT | Mod: 76 | Performed by: PHYSICIAN ASSISTANT

## 2022-02-14 PROCEDURE — 99214 OFFICE O/P EST MOD 30 MIN: CPT | Performed by: PHYSICIAN ASSISTANT

## 2022-02-14 ASSESSMENT — ENCOUNTER SYMPTOMS
DIZZINESS: 0
NAUSEA: 0
ABDOMINAL PAIN: 0
HEMATOCHEZIA: 0
ANAL BLEEDING: 0
LIGHT-HEADEDNESS: 0
PALPITATIONS: 0
FEVER: 0
SHORTNESS OF BREATH: 0
HEARTBURN: 1
NERVOUS/ANXIOUS: 0

## 2022-02-14 ASSESSMENT — MIFFLIN-ST. JEOR: SCORE: 1494.75

## 2022-02-14 ASSESSMENT — PAIN SCALES - GENERAL: PAINLEVEL: MILD PAIN (2)

## 2022-02-14 NOTE — PATIENT INSTRUCTIONS
For further evaluation of your chest pressure, back pain, and reflux, I would like you to have your  take you to the [facility] emergency department right away.  I will notify them that you are headed their direction.  Please reach out with any questions or concerns.

## 2022-02-16 ENCOUNTER — TELEPHONE (OUTPATIENT)
Dept: FAMILY MEDICINE | Facility: CLINIC | Age: 67
End: 2022-02-16
Payer: COMMERCIAL

## 2022-02-16 NOTE — TELEPHONE ENCOUNTER
Yes, ok to use omeprazole while away on vacation instead.  If 20 mg does not work, she can increase to 40 mg for a week or 2 and then switch over to the protonix when she gets back.     Cherry Wood PA-C

## 2022-02-16 NOTE — TELEPHONE ENCOUNTER
Patient calling in regarding the Protonix that was recommended for her. She was reading the side effects and diarrhea was a common one. She has a trip this weekend that requires a long car ride and she is hesitant to start this medication at this time. She request a message be sent to Cherry Wood or Thea Kent to see if she could take Prilosec for 2 weeks then start the Protonix? She said she has taken it in the past, it doesn't work as well but wanted to take something while she is traveling that she knows she will not have side effects from. She request providers approval before doing this.     Routed to providers to review.     Thank you,   Cherry Sotomayor RN

## 2022-04-14 ENCOUNTER — OFFICE VISIT (OUTPATIENT)
Dept: OPHTHALMOLOGY | Facility: CLINIC | Age: 67
End: 2022-04-14
Attending: OPHTHALMOLOGY
Payer: COMMERCIAL

## 2022-04-14 DIAGNOSIS — H53.40 VISUAL FIELD DEFECT: ICD-10-CM

## 2022-04-14 DIAGNOSIS — H53.10 SUBJECTIVE VISUAL DISTURBANCE: Primary | ICD-10-CM

## 2022-04-14 DIAGNOSIS — H47.20 PARTIAL OPTIC ATROPHY: ICD-10-CM

## 2022-04-14 PROCEDURE — 99213 OFFICE O/P EST LOW 20 MIN: CPT | Mod: GC | Performed by: OPHTHALMOLOGY

## 2022-04-14 PROCEDURE — 92133 CPTRZD OPH DX IMG PST SGM ON: CPT | Performed by: OPHTHALMOLOGY

## 2022-04-14 PROCEDURE — G0463 HOSPITAL OUTPT CLINIC VISIT: HCPCS | Performed by: TECHNICIAN/TECHNOLOGIST

## 2022-04-14 PROCEDURE — 92015 DETERMINE REFRACTIVE STATE: CPT

## 2022-04-14 PROCEDURE — 92083 EXTENDED VISUAL FIELD XM: CPT | Performed by: OPHTHALMOLOGY

## 2022-04-14 ASSESSMENT — REFRACTION_WEARINGRX
OD_SPHERE: -11.00
OS_AXIS: 038
OD_CYLINDER: SPHERE
OS_SPHERE: -12.25
OS_CYLINDER: +1.25

## 2022-04-14 ASSESSMENT — REFRACTION_MANIFEST
OD_ADD: +2.50
OS_SPHERE: -12.00
OS_CYLINDER: +1.00
OS_ADD: +2.50
OS_AXIS: 035
OD_CYLINDER: +0.25
OD_AXIS: 010
OD_SPHERE: -11.00

## 2022-04-14 ASSESSMENT — EXTERNAL EXAM - LEFT EYE: OS_EXAM: NORMAL

## 2022-04-14 ASSESSMENT — TONOMETRY
OS_IOP_MMHG: 12
OD_IOP_MMHG: 12
IOP_METHOD: ICARE

## 2022-04-14 ASSESSMENT — CONF VISUAL FIELD
OS_NORMAL: 1
OD_NORMAL: 1
METHOD: COUNTING FINGERS

## 2022-04-14 ASSESSMENT — CUP TO DISC RATIO
OS_RATIO: 0.5
OD_RATIO: 0.5

## 2022-04-14 ASSESSMENT — SLIT LAMP EXAM - LIDS
COMMENTS: MGD
COMMENTS: MGD

## 2022-04-14 ASSESSMENT — VISUAL ACUITY
METHOD: SNELLEN - LINEAR
CORRECTION_TYPE: GLASSES
OS_CC: 20/40
OD_CC: 20/30

## 2022-04-14 ASSESSMENT — EXTERNAL EXAM - RIGHT EYE: OD_EXAM: NORMAL

## 2022-04-14 NOTE — PROGRESS NOTES
1. 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   Doing very well after surgical resection by Dr. De Paz on 8/20/20 with most recent MRI in 11/2020 showing no recurrence or residual tumor.      Visual acuity and color vision remains normal in both eyes.  There remains no afferent pupillary defect.  OCT retinal nerve fiber layer in the right eye today shows mild thinning (post surgical / post decompression) since October visit and expect May's and today's OCT is the new normal baseline, stable OCT today.  Formal visual fields in both eyes remain essentially full. Follow-up visual field and retinal nerve fiber layer OCT in 1 year    2. 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 Vision stable and patient asymptomatic. Observe for now.  Call immediately for any worsening of vision.    Historical data:    08/2019 MRI brain with and without contrast:  IMPRESSION: Ovoid uniformly enhancing 1.4 cm extra-axial dural-based  mass positioned along the superior aspect of the right anterior  clinoid process, most likely representing a meningioma.    06/24/20 MRI brain with and without IV contrast   IMPRESSION: Interval increase in size of extra-axial enhancing mass  arising superiorly off the anterior clinoid process. The mass, which  most likely represents a meningioma, currently measures 1.7 cm in  greatest dimensions as compared to 1.4 cm on the prior study. There is  also some new edema in the adjacent brain parenchyma.     8/20/20:MRI brain and orbits with and without IV contrast   Impression:  Expected early postsurgical changes from right sphenoid wing  meningioma resection with a small amount of extra-axial collection and  pneumocephalus in the right frontal lobe.    8/20/20 patient underwent surgery with Dr. De Paz:    1.  Right frontotemporal craniotomy for  resection of intradural tumor.   2.  Right optic-clinoidal osteotomy for approach for the anterior fossa  3.  Use of intraoperative microscope.   4.  Use of intraoperative Stealth neuronavigation.   5.  Insertion of lumbar drain.      Pathology showed a grade 1 meningioma.  Patient has follow-up with Dr. De Paz on 20.    Most recent MRI 2020 and read by radiology as showin. Postoperative changes. Right frontal-temporal craniotomy. Enhancing  tissue in the right temporal and infratemporal fossa.  2. No evidence for any residual or recurrent tumor in the region of  the right anterior clinoid process.    Interim history and data  Last visit: 21  She thinks that since last appointment vision has been stable. No new headaches, no double vision.     No new neurologic symptoms including dysarthria, dysphagia, gait dysfunction or coordination abnormalities.    Last neurosurgery visit was virtual with Margarita Mooney on 10/15/21:  We reviewed the reassuring imaging findings. We'll plan to followup in 1 year with a new MRI prior to visit. She was encouraged to reach out with new questions or concerns in the meantime.     MRI brain wwo contrast 10/8/21  IMPRESSION:    1.  Postsurgical changes right frontal craniotomy for resection of  meningioma adjacent to the clinoid process, unchanged. No evidence of  residual recurrent tumor in the region of the anterior clinoid  process.  2.  No evidence of acute intracranial hemorrhage, mass effect, or  Infarction.    Plan for MRI in one year - not yet scheduled.    Today vision function stable    Octopus automated 30 degree visual field today - stable non-specific changes in the right eye with normal left eye.    OCT of the optic nerve head stable thinning of right nerve with mean of 77 (82 prior, 80 prior to that).  OCT of the optic nerve head revealed in the left eye shows normal compared to age-matched controls and stable retinal nerve fiber layer.         25  minutes were spent on the date of the encounter by me doing chart review, history and exam, documentation, and further activities as noted above    Complete documentation of historical and exam elements from today's encounter can be found in the full encounter summary report (not reduplicated in this progress note).  I personally obtained the chief complaint(s) and history of present illness.  I confirmed and edited as necessary the review of systems, past medical/surgical history, family history, social history, and examination findings as documented by others; and I examined the patient myself.  I personally reviewed the relevant tests, images, and reports as documented above.  I formulated and edited as necessary the assessment and plan and discussed the findings and management plan with the patient and family     MD Amandeep Guidry, DO   PGY-5 Neuro-Ophthalmology Fellow

## 2022-04-14 NOTE — LETTER
2022    RE: Neema Nixon  : 1955  MRN: [id number]    Dear Providers,    I saw our mutual patient, Neema Nixon, in follow-up in my clinic recently.  After a thorough neuro-ophthalmic history and examination, I came to the following conclusions:     1. 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   Doing very well after surgical resection by Dr. De Paz on 20 with most recent MRI in 2020 showing no recurrence or residual tumor.      Visual acuity and color vision remains normal in both eyes.  There remains no afferent pupillary defect.  OCT retinal nerve fiber layer in the right eye today shows mild thinning (post surgical / post decompression) since October visit and expect May's and today's OCT is the new normal baseline, stable OCT today.  Formal visual fields in both eyes remain essentially full. Follow-up visual field and retinal nerve fiber layer OCT in 1 year    2. 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 Vision stable and patient asymptomatic. Observe for now.  Call immediately for any worsening of vision.    Historical data:    2019 MRI brain with and without contrast:  IMPRESSION: Ovoid uniformly enhancing 1.4 cm extra-axial dural-based  mass positioned along the superior aspect of the right anterior  clinoid process, most likely representing a meningioma.    20 MRI brain with and without IV contrast   IMPRESSION: Interval increase in size of extra-axial enhancing mass  arising superiorly off the anterior clinoid process. The mass, which  most likely represents a meningioma, currently measures 1.7 cm in  greatest dimensions as compared to 1.4 cm on the prior study. There is  also some new edema in the adjacent brain parenchyma.     20:MRI brain and orbits with and without IV contrast   Impression:  Expected  early postsurgical changes from right sphenoid wing  meningioma resection with a small amount of extra-axial collection and  pneumocephalus in the right frontal lobe.    20 patient underwent surgery with Dr. De Paz:    1.  Right frontotemporal craniotomy for resection of intradural tumor.   2.  Right optic-clinoidal osteotomy for approach for the anterior fossa  3.  Use of intraoperative microscope.   4.  Use of intraoperative Stealth neuronavigation.   5.  Insertion of lumbar drain.      Pathology showed a grade 1 meningioma.  Patient has follow-up with Dr. De Paz on 20.    Most recent MRI 2020 and read by radiology as showin. Postoperative changes. Right frontal-temporal craniotomy. Enhancing  tissue in the right temporal and infratemporal fossa.  2. No evidence for any residual or recurrent tumor in the region of  the right anterior clinoid process.    Interim history and data  Last visit: 21  She thinks that since last appointment vision has been stable. No new headaches, no double vision.     No new neurologic symptoms including dysarthria, dysphagia, gait dysfunction or coordination abnormalities.    Last neurosurgery visit was virtual with Margarita Mooney on 10/15/21:  We reviewed the reassuring imaging findings. We'll plan to followup in 1 year with a new MRI prior to visit. She was encouraged to reach out with new questions or concerns in the meantime.     MRI brain wwo contrast 10/8/21  IMPRESSION:    1.  Postsurgical changes right frontal craniotomy for resection of  meningioma adjacent to the clinoid process, unchanged. No evidence of  residual recurrent tumor in the region of the anterior clinoid  process.  2.  No evidence of acute intracranial hemorrhage, mass effect, or  Infarction.    Plan for MRI in one year - not yet scheduled.    Today vision function stable    Octopus automated 30 degree visual field today - stable non-specific changes in the right eye with normal  left eye.    OCT of the optic nerve head stable thinning of right nerve with mean of 77 (82 prior, 80 prior to that).  OCT of the optic nerve head revealed in the left eye shows normal compared to age-matched controls and stable retinal nerve fiber layer.        For further exam details, please feel free to contact our office for additional records.  If you wish to contact me regarding this patient please email me at [email or web address] or give my clinic a call to arrange a phone conversation.    Sincerely,    Mikhail Hernández MD  , Neuro-Ophthalmology and Adult Strabismus Surgery  The Jaclyn Rodrigues Chair in Neuro-Ophthalmology  Department of Ophthalmology and Visual Neurosciences  [facility]

## 2022-05-16 ENCOUNTER — ANCILLARY PROCEDURE (OUTPATIENT)
Dept: MAMMOGRAPHY | Facility: CLINIC | Age: 67
End: 2022-05-16
Attending: PHYSICIAN ASSISTANT
Payer: COMMERCIAL

## 2022-05-16 DIAGNOSIS — Z12.31 VISIT FOR SCREENING MAMMOGRAM: ICD-10-CM

## 2022-05-16 PROCEDURE — 77067 SCR MAMMO BI INCL CAD: CPT | Mod: TC | Performed by: RADIOLOGY

## 2022-05-17 ENCOUNTER — OFFICE VISIT (OUTPATIENT)
Dept: FAMILY MEDICINE | Facility: CLINIC | Age: 67
End: 2022-05-17
Payer: COMMERCIAL

## 2022-05-17 ENCOUNTER — TELEPHONE (OUTPATIENT)
Dept: FAMILY MEDICINE | Facility: CLINIC | Age: 67
End: 2022-05-17

## 2022-05-17 VITALS
RESPIRATION RATE: 18 BRPM | SYSTOLIC BLOOD PRESSURE: 126 MMHG | HEIGHT: 67 IN | HEART RATE: 70 BPM | WEIGHT: 203.25 LBS | DIASTOLIC BLOOD PRESSURE: 66 MMHG | OXYGEN SATURATION: 98 % | TEMPERATURE: 97.1 F | BODY MASS INDEX: 31.9 KG/M2

## 2022-05-17 DIAGNOSIS — K21.00 GASTROESOPHAGEAL REFLUX DISEASE WITH ESOPHAGITIS WITHOUT HEMORRHAGE: Primary | ICD-10-CM

## 2022-05-17 DIAGNOSIS — R10.13 EPIGASTRIC PAIN: ICD-10-CM

## 2022-05-17 LAB
ALBUMIN SERPL-MCNC: 3.8 G/DL (ref 3.4–5)
ALP SERPL-CCNC: 88 U/L (ref 40–150)
ALT SERPL W P-5'-P-CCNC: 35 U/L (ref 0–50)
AST SERPL W P-5'-P-CCNC: 20 U/L (ref 0–45)
BILIRUB DIRECT SERPL-MCNC: 0.1 MG/DL (ref 0–0.2)
BILIRUB SERPL-MCNC: 0.6 MG/DL (ref 0.2–1.3)
LIPASE SERPL-CCNC: 82 U/L (ref 73–393)
PROT SERPL-MCNC: 7.3 G/DL (ref 6.8–8.8)

## 2022-05-17 PROCEDURE — 36415 COLL VENOUS BLD VENIPUNCTURE: CPT | Performed by: PHYSICIAN ASSISTANT

## 2022-05-17 PROCEDURE — 80076 HEPATIC FUNCTION PANEL: CPT | Performed by: PHYSICIAN ASSISTANT

## 2022-05-17 PROCEDURE — 83690 ASSAY OF LIPASE: CPT | Performed by: PHYSICIAN ASSISTANT

## 2022-05-17 PROCEDURE — 99214 OFFICE O/P EST MOD 30 MIN: CPT | Performed by: PHYSICIAN ASSISTANT

## 2022-05-17 RX ORDER — CIMETIDINE 400 MG
400 TABLET ORAL 2 TIMES DAILY
Qty: 56 TABLET | Refills: 0 | Status: SHIPPED | OUTPATIENT
Start: 2022-05-17 | End: 2022-05-17

## 2022-05-17 RX ORDER — CIMETIDINE 400 MG
400 TABLET ORAL 2 TIMES DAILY
Qty: 180 TABLET | Refills: 0 | Status: SHIPPED | OUTPATIENT
Start: 2022-05-17 | End: 2022-08-15

## 2022-05-17 ASSESSMENT — ENCOUNTER SYMPTOMS
COUGH: 0
ABDOMINAL PAIN: 0
FEVER: 0
TROUBLE SWALLOWING: 0
VOMITING: 0
DIAPHORESIS: 0
NAUSEA: 0

## 2022-05-17 ASSESSMENT — PAIN SCALES - GENERAL: PAINLEVEL: NO PAIN (0)

## 2022-05-17 NOTE — PROGRESS NOTES
Assessment & Plan     Gastroesophageal reflux disease with esophagitis without hemorrhage  Epigastric pain  Patient is a 66-year-old female who presents to clinic due to ongoing GERD/reflux.  She notes symptoms ongoing for approximately 30 years.  She did have upper endoscopy in [place] around 10 years ago.  Prilosec was not helpful for managing symptoms.  Patient tried Protonix, but ended up with itching, so discontinued medication.  She has been using Tagamet and Tums.  She notes ongoing reflux as well as intermittent significant epigastric discomfort.  Differential diagnosis includes GERD, ulcer, eosinophilic esophagitis, Chris's esophagus.  Shared decision making completed.  Will complete labs as well as upper endoscopy for further evaluation.  Discussed importance of taking medication to manage reflux daily.  Tagamet prescribed.  Return and urgent/emergent follow-up precautions provided.  - Adult Gastro Ref - Procedure Only; Future  - cimetidine (TAGAMET) 400 MG tablet; Take 1 tablet (400 mg) by mouth 2 times daily for 28 days  - Hepatic panel (Albumin, ALT, AST, Bili, Alk Phos, TP); Future  - Lipase; Future    See Patient Instructions    Return in about 4 weeks (around 6/14/2022), or if symptoms worsen or fail to improve.    Thea Veronica PA-C  [facility] [place] is a 66 year old who presents for the following health issues     HPI     GERD/Heartburn  Onset/Duration: Years. Protonix-finished one bottle but developed itching of the palms and hands, so she stopped the medication and is using Tagmet and Tums. Patient is avoiding eating before bed and avoids triggering foods. Symptoms are back to baseline, but she has persistent GERD and at times significant epigastric discomfort. History of upper endoscopy completed around 10 years.   Description: Burning in stomach with esophageal tightness  Intensity: moderate  Progression of Symptoms: same  Accompanying Signs &  "Symptoms:  Does it feel like food gets stuck or trouble swallowing: no  Nausea: no  Vomiting (bloody?): no  Abdominal Pain: no  Black-Tarry stools: no  Bloody stools: no  History:  Previous similar episodes: YES  Previous ulcers: YES- Found when completing upper endoscopy  Precipitating factors:   Caffeine use: no  Alcohol use: YES- Rare  NSAID/Aspirin use: no  Tobacco use: no  Worse with spicy foods and caffeinated drinks.  Alleviating factors: None  Therapies tried and outcome:             Lifestyle changes: None            Medications:  Protonix        Review of Systems   Constitutional: Negative for diaphoresis and fever.   HENT: Negative for trouble swallowing.    Respiratory: Negative for cough.    Cardiovascular: Positive for chest pain (intermittent, improving).   Gastrointestinal: Negative for abdominal pain, nausea and vomiting.            Objective    /66   Pulse 70   Temp 97.1  F (36.2  C) (Tympanic)   Resp 18   Ht 1.71 m (5' 7.32\")   Wt 92.2 kg (203 lb 4 oz)   LMP  (LMP Unknown)   SpO2 98%   Breastfeeding No   BMI 31.53 kg/m    Body mass index is 31.53 kg/m .  Physical Exam  Vitals and nursing note reviewed.   Constitutional:       General: She is not in acute distress.     Appearance: Normal appearance.   HENT:      Head: Normocephalic and atraumatic.   Eyes:      Extraocular Movements: Extraocular movements intact.      Pupils: Pupils are equal, round, and reactive to light.   Cardiovascular:      Rate and Rhythm: Normal rate and regular rhythm.      Heart sounds: Normal heart sounds.   Pulmonary:      Effort: Pulmonary effort is normal.      Breath sounds: Normal breath sounds.   Abdominal:      General: Bowel sounds are normal.      Palpations: Abdomen is soft.      Tenderness: There is no abdominal tenderness. There is no guarding or rebound.   Musculoskeletal:         General: Normal range of motion.      Cervical back: Normal range of motion.   Skin:     General: Skin is warm and " dry.   Neurological:      General: No focal deficit present.      Mental Status: She is alert.   Psychiatric:         Mood and Affect: Mood normal.         Behavior: Behavior normal.                        Answers for HPI/ROS submitted by the patient on 5/10/2022  How many servings of fruits and vegetables do you eat daily?: 2-3  On average, how many sweetened beverages do you drink each day (Examples: soda, juice, sweet tea, etc.  Do NOT count diet or artificially sweetened beverages)?: 1  How many minutes a day do you exercise enough to make your heart beat faster?: 20 to 29  How many days a week do you exercise enough to make your heart beat faster?: 5  How many days per week do you miss taking your medication?: 0  What is the reason for your visit today?: Regular checkup and continuing GERDS issues  When did your symptoms begin?: More than a month  What are your symptoms?: Heartburn  How would you describe these symptoms?: Moderate  Are your symptoms:: Staying the same  Have you had these symptoms before?: Yes  Have you tried or received treatment for these symptoms before?: Yes  Did that treatment work? : Yes  Please describe the treatment you had:: Pantoprazole and Prilosec  Is there anything that makes you feel worse?: Many foods and drink. Pantoprazole caused stomach cramps, gas, and continual itching of palms and feet.  Is there anything that makes you feel better?: Taking over the counter meds - either Tagsmet or Tums almost daily

## 2022-05-17 NOTE — PATIENT INSTRUCTIONS
For further evaluation of your upper abdominal pain and ongoing reflux we have ordered lab work as well as an upper endoscopy.  We will send lab results to [facility].  The scheduling team will reach out to you to arrange your upper endoscopy.  Please take your prescribed Tagamet daily.  Reach out with questions or concerns.  Return to clinic for new or worsening symptoms.    Additionally, schedule your yearly physical with your primary care provider, Cherry.

## 2022-05-20 ENCOUNTER — TELEPHONE (OUTPATIENT)
Dept: GASTROENTEROLOGY | Facility: CLINIC | Age: 67
End: 2022-05-20
Payer: COMMERCIAL

## 2022-05-20 NOTE — TELEPHONE ENCOUNTER
Screening Questions  BlueKIND OF PREP RedLOCATION [review exclusion criteria] GreenSEDATION TYPE  1. Have you had a positive covid test in the last 90 days? n     2. Do you have a legal guardian or medical Power of ?  Are you able to give consent for your medical care?y (Sedation review/consideration needed)    3. Are you active on mychart? Y    4. What insurance is in the chart? [facility]     3.   Ordering/Referring Provider: Thea Veronica PA-C in  FAMILY PRACTICE    4. BMI 31.8 [BMI OVER 40-EXTENDED PREP]  If greater than 40 review exclusion criteria [PAC APPT IF @ [facility]]        5.  Respiratory Screening :  [If yes to any of the following HOSPITAL setting only]     Do you use daily home oxygen? N    Do you have mod to severe Obstructive Sleep Apnea? N  [OKAY @ [facility] SH PH RI]   Do you have Pulmonary Hypertension? N     Do you have UNCONTROLLED asthma? N        6.   Have you had a heart or lung transplant? N      7.   Are you currently on dialysis? N [ If yes, G-PREP & HOSPITAL setting only]     8.   Do you have chronic kidney disease? N [ If yes, G-PREP ]    9.   Have you had a stroke or Transient ischemic attack (TIA - aka  mini stroke ) within 6 months?  N (If yes, please review exclusion criteria)    10.   In the past 6 months, have you had any heart related issues including cardiomyopathy or heart attack? N           If yes, did it require cardiac stenting or other implantable device? N      11.   Do you have any implantable devices in your body (pacemaker, defib, LVAD)? N (If yes, please review exclusion criteria)    12.   Do you take nitroglycerin? N           If yes, how often? N  (if yes, HOSPITAL setting ONLY)    13.   Are you currently taking any blood thinners? N           [IF YES, INFORM PATIENT TO FOLLOW UP W/ ORDERING PROVIDER FOR BRIDGING INSTRUCTIONS]     14.   Do you have a diagnosis of diabetes? N [ If yes, G-PREP ]    15.   [FEMALES] Are you currently pregnant?     If yes, how many  weeks?     16.   Are you taking any prescription pain medications on a routine schedule?  N  [ If yes, EXTENDED PREP.] [If yes, MAC]    17.   Do you have any chemical dependencies such as alcohol, street drugs, or methadone?  N [If yes, MAC]    18.   Do you have any history of post-traumatic stress syndrome, severe anxiety or history of psychosis?  N  [If yes, MAC]    19.   Do you transfer independently?  Y    20.  On a regular basis do you go 3-5 days between bowel movements?   [ If yes, EXTENDED PREP.]    21.   Preferred LOCAL Pharmacy for Pre Prescription      [id number] [place] [facility] [address]  [facility] [place] [facility] [address] [facility]  [facility] [place] [id number] [id number] [address] AT [facility] & [facility]      Scheduling Details      Caller : Neema  (Please ask for phone number if not scheduled by patient)    Type of Procedure Scheduled: EGD  Which Colonoscopy Prep was Sent?:   ROB CF PATIENTS & GROEN'S PATIENTS NEEDS EXTENDED PREP  Surgeon: nadeen  Date of Procedure: 7/21  Location: [facility]      Sedation Type: CS  Conscious Sedation- Needs  for 6 hours after the procedure  MAC/General-Needs  for 24 hours after procedure    Pre-op Required at [facility] [place], [place] and OR for MAC sedation: n  (advise patient they will need a pre-op prior to procedure -)      Informed patient they will need an adult  y  Cannot take any type of public or medical transportation alone    [facility] or Externally: shira Keane Nurse will call to complete assessment y    Additional comments: none

## 2022-06-15 ASSESSMENT — ENCOUNTER SYMPTOMS
MYALGIAS: 0
FREQUENCY: 0
SHORTNESS OF BREATH: 0
SORE THROAT: 0
JOINT SWELLING: 0
FEVER: 0
CHILLS: 0
NAUSEA: 0
HEARTBURN: 1
NERVOUS/ANXIOUS: 0
BREAST MASS: 0
DYSURIA: 0
COUGH: 0
ABDOMINAL PAIN: 0
DIARRHEA: 0
CONSTIPATION: 0
DIZZINESS: 0
HEMATOCHEZIA: 0
EYE PAIN: 0
ARTHRALGIAS: 0
PARESTHESIAS: 0
PALPITATIONS: 0
HEMATURIA: 0
HEADACHES: 0
WEAKNESS: 0

## 2022-06-15 ASSESSMENT — ACTIVITIES OF DAILY LIVING (ADL): CURRENT_FUNCTION: NO ASSISTANCE NEEDED

## 2022-06-16 ENCOUNTER — MYC MEDICAL ADVICE (OUTPATIENT)
Dept: FAMILY MEDICINE | Facility: CLINIC | Age: 67
End: 2022-06-16

## 2022-06-16 ENCOUNTER — OFFICE VISIT (OUTPATIENT)
Dept: FAMILY MEDICINE | Facility: CLINIC | Age: 67
End: 2022-06-16
Payer: COMMERCIAL

## 2022-06-16 VITALS
HEART RATE: 71 BPM | BODY MASS INDEX: 30.31 KG/M2 | OXYGEN SATURATION: 96 % | HEIGHT: 68 IN | RESPIRATION RATE: 16 BRPM | TEMPERATURE: 98.2 F | WEIGHT: 200 LBS | SYSTOLIC BLOOD PRESSURE: 146 MMHG | DIASTOLIC BLOOD PRESSURE: 82 MMHG

## 2022-06-16 DIAGNOSIS — M85.80 OSTEOPENIA, UNSPECIFIED LOCATION: ICD-10-CM

## 2022-06-16 DIAGNOSIS — R63.5 WEIGHT GAIN: ICD-10-CM

## 2022-06-16 DIAGNOSIS — Z78.0 ASYMPTOMATIC MENOPAUSE: ICD-10-CM

## 2022-06-16 DIAGNOSIS — D32.9 MENINGIOMA (H): ICD-10-CM

## 2022-06-16 DIAGNOSIS — E53.8 VITAMIN B12 DEFICIENCY (NON ANEMIC): ICD-10-CM

## 2022-06-16 DIAGNOSIS — Z00.00 ROUTINE GENERAL MEDICAL EXAMINATION AT A HEALTH CARE FACILITY: Primary | ICD-10-CM

## 2022-06-16 DIAGNOSIS — M25.562 LEFT KNEE PAIN, UNSPECIFIED CHRONICITY: ICD-10-CM

## 2022-06-16 DIAGNOSIS — R03.0 ELEVATED BLOOD PRESSURE READING WITHOUT DIAGNOSIS OF HYPERTENSION: ICD-10-CM

## 2022-06-16 LAB
TSH SERPL DL<=0.005 MIU/L-ACNC: 2.03 MU/L (ref 0.4–4)
VIT B12 SERPL-MCNC: 2679 PG/ML (ref 193–986)

## 2022-06-16 PROCEDURE — 99214 OFFICE O/P EST MOD 30 MIN: CPT | Mod: 25 | Performed by: PHYSICIAN ASSISTANT

## 2022-06-16 PROCEDURE — 82607 VITAMIN B-12: CPT | Performed by: PHYSICIAN ASSISTANT

## 2022-06-16 PROCEDURE — 36415 COLL VENOUS BLD VENIPUNCTURE: CPT | Performed by: PHYSICIAN ASSISTANT

## 2022-06-16 PROCEDURE — 99397 PER PM REEVAL EST PAT 65+ YR: CPT | Performed by: PHYSICIAN ASSISTANT

## 2022-06-16 PROCEDURE — 84443 ASSAY THYROID STIM HORMONE: CPT | Performed by: PHYSICIAN ASSISTANT

## 2022-06-16 ASSESSMENT — PAIN SCALES - GENERAL: PAINLEVEL: NO PAIN (0)

## 2022-06-16 ASSESSMENT — ENCOUNTER SYMPTOMS
ABDOMINAL PAIN: 0
BREAST MASS: 0
JOINT SWELLING: 0
HEMATURIA: 0
FREQUENCY: 0
DYSURIA: 0
COUGH: 0
SORE THROAT: 0
NAUSEA: 0
CHILLS: 0
HEARTBURN: 1
NERVOUS/ANXIOUS: 0
MYALGIAS: 0
ARTHRALGIAS: 0
HEMATOCHEZIA: 0
WEAKNESS: 0
PALPITATIONS: 0
CONSTIPATION: 0
FEVER: 0
PARESTHESIAS: 0
DIARRHEA: 0
EYE PAIN: 0
HEADACHES: 0
SHORTNESS OF BREATH: 0
DIZZINESS: 0

## 2022-06-16 ASSESSMENT — ACTIVITIES OF DAILY LIVING (ADL): CURRENT_FUNCTION: NO ASSISTANCE NEEDED

## 2022-06-16 NOTE — PATIENT INSTRUCTIONS
Limit salt intake to < 2300 mg/day if possible.    Monitor blood pressures at home (daily or every other day) and the goal is consistently < 140/90 mmhg.  If numbers are higher than that, please reach out, we may want to start medications.       Please call [facility] Scheduling at [phone]  to set up the DEXA scan.     Please obtain your last colonoscopy report and upload to [employer].         Preventive Health Recommendations    See your health care provider every year to  Review health changes.   Discuss preventive care.    Review your medicines if your doctor has prescribed any.    You no longer need a yearly Pap test unless you've had an abnormal Pap test in the past 10 years. If you have vaginal symptoms, such as bleeding or discharge, be sure to talk with your provider about a Pap test.    Every 1 to 2 years, have a mammogram.  If you are over 69, talk with your health care provider about whether or not you want to continue having screening mammograms.    Every 10 years, have a colonoscopy. Or, have a yearly FIT test (stool test). These exams will check for colon cancer.     Have a cholesterol test every 5 years, or more often if your doctor advises it.     Have a diabetes test (fasting glucose) every three years. If you are at risk for diabetes, you should have this test more often.     At age 65, have a bone density scan (DEXA) to check for osteoporosis (brittle bone disease).    Shots:  Get a flu shot each year.  Get a tetanus shot every 10 years.  Talk to your doctor about your pneumonia vaccines. There are now two you should receive - Pneumovax (PPSV 23) and Prevnar (PCV 13).  Talk to your pharmacist about the shingles vaccine.  Talk to your doctor about the hepatitis B vaccine.    Nutrition:   Eat at least 5 servings of fruits and vegetables each day.    Eat whole-grain bread, whole-wheat pasta and brown rice instead of white grains and rice.    Get adequate about Calcium and Vitamin D.      Lifestyle  Exercise at least 150 minutes a week (30 minutes a day, 5 days a week). This will help you control your weight and prevent disease.    Limit alcohol to one drink per day.    No smoking.     Wear sunscreen to prevent skin cancer.     See your dentist twice a year for an exam and cleaning.    See your eye doctor every 1 to 2 years to screen for conditions such as glaucoma, macular degeneration, cataracts, etc.    Personalized Prevention Plan  You are due for the preventive services outlined below.  Your care team is available to assist you in scheduling these services.  If you have already completed any of these items, please share that information with your care team to update in your medical record.    Health Maintenance Due   Topic Date Due    Osteoporosis Screening  Never done    Annual Wellness Visit  04/28/2022    ANNUAL REVIEW OF HM ORDERS  04/28/2022

## 2022-06-16 NOTE — PROGRESS NOTES
"SUBJECTIVE:   Neema Nixon is a 67 year old female who presents for Preventive Visit.      Patient has been advised of split billing requirements and indicates understanding: Yes  Are you in the first 12 months of your Medicare coverage?  No    Healthy Habits:     In general, how would you rate your overall health?  Excellent    Frequency of exercise:  2-3 days/week    Duration of exercise:  30-45 minutes    Do you usually eat at least 4 servings of fruit and vegetables a day, include whole grains    & fiber and avoid regularly eating high fat or \"junk\" foods?  Yes    Taking medications regularly:  Yes    Medication side effects:  None    Ability to successfully perform activities of daily living:  No assistance needed    Home Safety:  No safety concerns identified    Hearing Impairment:  No hearing concerns    In the past 6 months, have you been bothered by leaking of urine?  No    In general, how would you rate your overall mental or emotional health?  Excellent      PHQ-2 Total Score: 0    Additional concerns today:  Yes    She c/o pain on outer left knee for the past few months. No injury noted.  Overall is improving.  Symptoms did feel unstable at first.  She can walk and go up and down stairs without issues.  Currently pain comes and goes (mainly worse if she is walking on it a lot).    Pain does not wake her up at night.    No numbness or tingling.      She has noticed high blood pressures off and on, typically come down after she rests and they are rechecked.  She has tried limiting salt in the past and has done the DASH diet in the past.        Do you feel safe in your environment? Yes    Have you ever done Advance Care Planning? (For example, a Health Directive, POLST, or a discussion with a medical provider or your loved ones about your wishes): Yes, patient states has an Advance Care Planning document and will bring a copy to the clinic.       Fall risk  Fallen 2 or more times in the past year?: " No  Any fall with injury in the past year?: No    Cognitive Screening   1) Repeat 3 items (Leader, Season, Table)    2) Clock draw: ABNORMAL hands placed at wrong time  3) 3 item recall: Recalls 3 objects  Results: 3 items recalled: COGNITIVE IMPAIRMENT LESS LIKELY    Mini-CogTM Copyright GUY Rodriguez. Licensed by the author for use in [facility]; reprinted with permission (vance@[facility]). All rights reserved.      Do you have sleep apnea, excessive snoring or daytime drowsiness?: no    Reviewed and updated as needed this visit by clinical staff                    Does word games.     Reviewed and updated as needed this visit by Provider                   Social History     Tobacco Use     Smoking status: Never Smoker     Smokeless tobacco: Never Used   Substance Use Topics     Alcohol use: Not Currently     Comment: minor         Alcohol Use 6/15/2022   Prescreen: >3 drinks/day or >7 drinks/week? No   Prescreen: >3 drinks/day or >7 drinks/week? -               Current providers sharing in care for this patient include:   Patient Care Team:  Cherry Wood PA-C as PCP - General (Family Medicine)  Mikhail Hernández MD as MD (Ophthalmology)  Mikhail Hernández MD as Assigned Surgical Provider  Margarita Mooney PA-C as Assigned Neuroscience Provider  Cherry Wood PA-C as Assigned PCP    The following health maintenance items are reviewed in Epic and correct as of today:  Health Maintenance Due   Topic Date Due     DEXA  Never done     MEDICARE ANNUAL WELLNESS VISIT  04/28/2022     ANNUAL REVIEW OF HM ORDERS  04/28/2022     Lab work is in process  Labs reviewed in EPIC  BP Readings from Last 3 Encounters:   06/16/22 (!) 146/82   05/17/22 126/66   02/14/22 130/84    Wt Readings from Last 3 Encounters:   06/16/22 90.7 kg (200 lb)   05/17/22 92.2 kg (203 lb 4 oz)   02/14/22 92.7 kg (204 lb 6 oz)                      Breast CA Risk Assessment (FHS-7) 4/21/2021   Do you have a family  "history of breast, colon, or ovarian cancer? No / Unknown         Mammogram Screening: Recommended annual mammography  Pertinent mammograms are reviewed under the imaging tab.    Review of Systems   Constitutional: Negative for chills and fever.   HENT: Negative for congestion, ear pain, hearing loss and sore throat.    Eyes: Negative for pain and visual disturbance.   Respiratory: Negative for cough and shortness of breath.    Cardiovascular: Negative for chest pain, palpitations and peripheral edema.   Gastrointestinal: Positive for heartburn. Negative for abdominal pain, constipation, diarrhea, hematochezia and nausea.   Breasts:  Negative for tenderness, breast mass and discharge.   Genitourinary: Negative for dysuria, frequency, genital sores, hematuria, pelvic pain, urgency, vaginal bleeding and vaginal discharge.   Musculoskeletal: Negative for arthralgias, joint swelling and myalgias.   Skin: Negative for rash.   Neurological: Negative for dizziness, weakness, headaches and paresthesias.   Psychiatric/Behavioral: Negative for mood changes. The patient is not nervous/anxious.      Constitutional, HEENT, cardiovascular, pulmonary, GI, , musculoskeletal, neuro, skin, endocrine and psych systems are negative, except as otherwise noted.    OBJECTIVE:   LMP  (LMP Unknown)  Estimated body mass index is 31.53 kg/m  as calculated from the following:    Height as of 5/17/22: 1.71 m (5' 7.32\").    Weight as of 5/17/22: 92.2 kg (203 lb 4 oz).  Physical Exam  GENERAL APPEARANCE: healthy, alert and no distress  EYES: Eyes grossly normal to inspection, PERRL and conjunctivae and sclerae normal  HENT: ear canals and TM's normal, nose and mouth without ulcers or lesions, oropharynx clear and oral mucous membranes moist  NECK: no adenopathy, no asymmetry, masses, or scars and thyroid normal to palpation  RESP: lungs clear to auscultation - no rales, rhonchi or wheezes  BREAST: normal without masses, tenderness or nipple " discharge and no palpable axillary masses or adenopathy  CV: regular rate and rhythm, normal S1 S2, no S3 or S4, no murmur, click or rub, no peripheral edema and peripheral pulses strong  ABDOMEN: soft, nontender, no hepatosplenomegaly, no masses and bowel sounds normal  MS: no musculoskeletal defects are noted and gait is age appropriate without ataxia    GAIT: NORMAL  Dorsalis Pedis pulses intact bilaterally.  MUSCULOSKELETAL:  LEFT KNEE   Inspection: AP/lateral alignment normal, No effusion  Tender: lateral joint line  Non-tender: medial joint line, popliteal region  Active Range of Motion: full flexion, full extension  Strength: full  Special tests: normal Valgus stress test, negative Lachman's test  No warmth noted over bilateral knees.               SKIN: no suspicious lesions or rashes  NEURO: Normal strength and tone, sensory exam grossly normal, mentation intact and speech normal  PSYCH: mentation appears normal and affect normal/bright    Diagnostic Test Results:  Labs reviewed in Epic    ASSESSMENT / PLAN:   (Z00.00) Routine general medical examination at a health care facility  (primary encounter diagnosis)  Comment:      HEALTH CARE MAINTENANCE              Reviewed USPTF recommendations and anticipatory guidance.              See orders.       Please call [facility] Sched[facility] at [phone]  to set up the DEXA scan.     Please obtain your last colonoscopy report and upload to [employer].     (R03.0) Elevated blood pressure reading without diagnosis of hypertension  Comment:   reviewed diet, exercise and weight control, recommended sodium restriction, cardiovascular risk and specific lipid/LDL goals reviewed.  Discussed long term complications of untreated htn.    Follow-up in 4 weeks with a [employer] message with updated BP readings.  Sent delayed message today.     Plan: Limit salt intake to < 2300 mg/day if possible.    Monitor blood pressures at home (daily or every other day) and the goal is  "consistently < 140/90 mmhg.  If numbers are higher than that, please reach out, we may want to start medications.     (R63.5) Weight gain  Comment: will check thyroid levels and go from there.   Plan: TSH with free T4 reflex            (M85.80) Osteopenia, unspecified location  Comment: she states was in her hip previously.  Due to recheck.   Plan:     (Z78.0) Asymptomatic menopause  Comment: Discussed the importance of Calcium and vitamin D intake, weight bearing exercise, and bone density monitoring.  Discussed preventative measures such as keep throw rugs and cords off floors, handrails along stairwells and in bathroom.        Discussed treatment options, including bisphosphanates, as a way to slow down bone loss, including potential side effects.          Plan: DEXA HIP/PELVIS/SPINE - Future            (E53.8) Vitamin B12 deficiency (non anemic)  Comment: on a daily supplement, will recheck levels.   Plan: Vitamin B12            (M25.562) Left knee pain, unspecified chronicity  Comment: Knee exam normal, likely due to either a mild ligament/meniscus strain versus arthritis.   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 We'll plan to followup in 1 year with a new MRI prior to visit. She was encouraged to reach out with new questions or concerns in the meantime. \"    Patient has been advised of split billing requirements and indicates understanding: Yes    COUNSELING:  Reviewed preventive health counseling, as reflected in patient instructions       Regular exercise       Healthy diet/nutrition    Estimated body mass index is 31.53 kg/m  as calculated from the following:    Height as of 5/17/22: 1.71 m (5' 7.32\").    Weight as of 5/17/22: " 92.2 kg (203 lb 4 oz).    Weight management plan: Discussed healthy diet and exercise guidelines    She reports that she has never smoked. She has never used smokeless tobacco.      Appropriate preventive services were discussed with this patient, including applicable screening as appropriate for cardiovascular disease, diabetes, osteopenia/osteoporosis, and glaucoma.  As appropriate for age/gender, discussed screening for colorectal cancer, prostate cancer, breast cancer, and cervical cancer. Checklist reviewing preventive services available has been given to the patient.    Reviewed patients plan of care and provided an AVS. The Intermediate Care Plan ( asthma action plan, low back pain action plan, and migraine action plan) for Neema meets the Care Plan requirement. This Care Plan has been established and reviewed with the Patient.    Counseling Resources:  ATP IV Guidelines  Pooled Cohorts Equation Calculator  Breast Cancer Risk Calculator  Breast Cancer: Medication to Reduce Risk  FRAX Risk Assessment  ICSI Preventive Guidelines  Dietary Guidelines for Americans, 2010  USDA's MyPlate  ASA Prophylaxis  Lung CA Screening    TIFFANY Tian [facility]    Identified Health Risks:

## 2022-06-24 ENCOUNTER — ANCILLARY PROCEDURE (OUTPATIENT)
Dept: BONE DENSITY | Facility: CLINIC | Age: 67
End: 2022-06-24
Attending: PHYSICIAN ASSISTANT
Payer: COMMERCIAL

## 2022-06-24 DIAGNOSIS — Z78.0 ASYMPTOMATIC MENOPAUSE: ICD-10-CM

## 2022-06-24 PROCEDURE — 77080 DXA BONE DENSITY AXIAL: CPT | Performed by: INTERNAL MEDICINE

## 2022-07-18 ENCOUNTER — LAB (OUTPATIENT)
Dept: LAB | Facility: CLINIC | Age: 67
End: 2022-07-18
Payer: COMMERCIAL

## 2022-07-18 DIAGNOSIS — Z20.822 ENCOUNTER FOR LABORATORY TESTING FOR COVID-19 VIRUS: ICD-10-CM

## 2022-07-18 PROCEDURE — U0003 INFECTIOUS AGENT DETECTION BY NUCLEIC ACID (DNA OR RNA); SEVERE ACUTE RESPIRATORY SYNDROME CORONAVIRUS 2 (SARS-COV-2) (CORONAVIRUS DISEASE [COVID-19]), AMPLIFIED PROBE TECHNIQUE, MAKING USE OF HIGH THROUGHPUT TECHNOLOGIES AS DESCRIBED BY CMS-2020-01-R: HCPCS

## 2022-07-18 PROCEDURE — U0005 INFEC AGEN DETEC AMPLI PROBE: HCPCS

## 2022-07-19 LAB — SARS-COV-2 RNA RESP QL NAA+PROBE: NEGATIVE

## 2022-07-21 ENCOUNTER — HOSPITAL ENCOUNTER (OUTPATIENT)
Facility: AMBULATORY SURGERY CENTER | Age: 67
Discharge: HOME | End: 2022-07-21
Attending: SURGERY | Admitting: SURGERY
Payer: COMMERCIAL

## 2022-07-21 VITALS
TEMPERATURE: 97 F | HEART RATE: 62 BPM | RESPIRATION RATE: 14 BRPM | BODY MASS INDEX: 29.55 KG/M2 | DIASTOLIC BLOOD PRESSURE: 79 MMHG | SYSTOLIC BLOOD PRESSURE: 139 MMHG | WEIGHT: 195 LBS | HEIGHT: 68 IN | OXYGEN SATURATION: 95 %

## 2022-07-21 DIAGNOSIS — K21.00 GASTROESOPHAGEAL REFLUX DISEASE WITH ESOPHAGITIS WITHOUT HEMORRHAGE: Primary | ICD-10-CM

## 2022-07-21 DIAGNOSIS — K25.3 ACUTE GASTRIC ULCER WITHOUT HEMORRHAGE OR PERFORATION: ICD-10-CM

## 2022-07-21 LAB — UPPER GI ENDOSCOPY: NORMAL

## 2022-07-21 PROCEDURE — G0500 MOD SEDAT ENDO SERVICE >5YRS: HCPCS | Performed by: SURGERY

## 2022-07-21 PROCEDURE — 43239 EGD BIOPSY SINGLE/MULTIPLE: CPT | Performed by: SURGERY

## 2022-07-21 PROCEDURE — G8907 PT DOC NO EVENTS ON DISCHARG: HCPCS

## 2022-07-21 PROCEDURE — G8918 PT W/O PREOP ORDER IV AB PRO: HCPCS

## 2022-07-21 PROCEDURE — 43239 EGD BIOPSY SINGLE/MULTIPLE: CPT

## 2022-07-21 RX ORDER — NALOXONE HYDROCHLORIDE 0.4 MG/ML
0.4 INJECTION, SOLUTION INTRAMUSCULAR; INTRAVENOUS; SUBCUTANEOUS
Status: DISCONTINUED | OUTPATIENT
Start: 2022-07-21 | End: 2022-07-22 | Stop reason: HOSPADM

## 2022-07-21 RX ORDER — NALOXONE HYDROCHLORIDE 0.4 MG/ML
0.2 INJECTION, SOLUTION INTRAMUSCULAR; INTRAVENOUS; SUBCUTANEOUS
Status: DISCONTINUED | OUTPATIENT
Start: 2022-07-21 | End: 2022-07-22 | Stop reason: HOSPADM

## 2022-07-21 RX ORDER — LIDOCAINE 40 MG/G
CREAM TOPICAL
Status: DISCONTINUED | OUTPATIENT
Start: 2022-07-21 | End: 2022-07-22 | Stop reason: HOSPADM

## 2022-07-21 RX ORDER — FENTANYL CITRATE 50 UG/ML
INJECTION, SOLUTION INTRAMUSCULAR; INTRAVENOUS PRN
Status: DISCONTINUED | OUTPATIENT
Start: 2022-07-21 | End: 2022-07-21 | Stop reason: HOSPADM

## 2022-07-21 RX ORDER — ONDANSETRON 2 MG/ML
4 INJECTION INTRAMUSCULAR; INTRAVENOUS EVERY 6 HOURS PRN
Status: DISCONTINUED | OUTPATIENT
Start: 2022-07-21 | End: 2022-07-22 | Stop reason: HOSPADM

## 2022-07-21 RX ORDER — FLUMAZENIL 0.1 MG/ML
0.2 INJECTION, SOLUTION INTRAVENOUS
Status: ACTIVE | OUTPATIENT
Start: 2022-07-21 | End: 2022-07-21

## 2022-07-21 RX ORDER — PROCHLORPERAZINE MALEATE 5 MG
5 TABLET ORAL EVERY 6 HOURS PRN
Status: DISCONTINUED | OUTPATIENT
Start: 2022-07-21 | End: 2022-07-22 | Stop reason: HOSPADM

## 2022-07-21 RX ORDER — ONDANSETRON 4 MG/1
4 TABLET, ORALLY DISINTEGRATING ORAL EVERY 6 HOURS PRN
Status: DISCONTINUED | OUTPATIENT
Start: 2022-07-21 | End: 2022-07-22 | Stop reason: HOSPADM

## 2022-07-21 RX ORDER — OMEPRAZOLE 20 MG/1
20 TABLET, DELAYED RELEASE ORAL EVERY 12 HOURS
Qty: 60 TABLET | Refills: 11 | Status: SHIPPED | OUTPATIENT
Start: 2022-07-21 | End: 2023-07-26

## 2022-07-21 NOTE — DISCHARGE INSTRUCTIONS
[facility]   Adult Discharge Orders & Instructions     For 24 hours after surgery    Get plenty of rest.  A responsible adult must stay with you for at least 24 hours after you leave the hospital.   Do not drive or use heavy equipment.  If you have weakness or tingling, don't drive or use heavy equipment until this feeling goes away.  Do not drink alcohol.  Avoid strenuous or risky activities.  Ask for help when climbing stairs.   You may feel lightheaded.  IF so, sit for a few minutes before standing.  Have someone help you get up.   If you have nausea (feel sick to your stomach): Drink only clear liquids such as apple juice, ginger ale, broth or 7-Up.  Rest may also help.  Be sure to drink enough fluids.  Move to a regular diet as you feel able.  You may have a slight fever. Call the doctor if your fever is over 100 F (37.7 C) (taken under the tongue) or lasts longer than 24 hours.  You may have a dry mouth, a sore throat, muscle aches or trouble sleeping.  These should go away after 24 hours.  Do not make important or legal decisions.     Call your doctor for any of the followin.  Signs of infection (fever, growing tenderness at the surgery site, a large amount of drainage or bleeding, severe pain, foul-smelling drainage, redness, swelling).    2. It has been over 8 to 10 hours since surgery and you are still not able to urinate (pass water).    3.  Headache for over 24 hours.    4.  Numbness, tingling or weakness the day after surgery (if you had spinal anesthesia).                  5. Signs of Covid-19 infection (temperature over 100 degrees, shortness of breath, cough, loss of taste/smell, generalized body aches, persistent headache,                  chills, sore throat, nausea/vomiting/diarrhea).    To contact a doctor, call ________________________________________

## 2022-07-21 NOTE — H&P
ENDOSCOPY PRE-SEDATION H&P FOR OUTPATIENT PROCEDURES    Neema Nixon  [id number]  1955    Procedure:     EGD with possible biopsy possible dilatation     Pre-procedure diagnosis: dysphagia    Past medical history:   Past Medical History:   Diagnosis Date     Double vision 10/23/2019     GERD (gastroesophageal reflux disease) 06/01/2010     Meningioma (H) 07/24/2020     Vision loss of right eye 07/24/2020       Past surgical history:   Past Surgical History:   Procedure Laterality Date     COLONOSCOPY WITH CO2 INSUFFLATION N/A 7/27/2021    Procedure: COLONOSCOPY, WITH CO2 INSUFFLATION;  Surgeon: Sheri Summers DO;  Location: [facility] OR     INSERT DRAIN LUMBAR N/A 8/20/2020    Procedure: INSERTION, DRAIN, SPINE, LUMBAR;  Surgeon: Deni De Paz MD;  Location: [facility] OR     LEG SURGERY Right 2013    after fracture     OPTICAL TRACKING SYSTEM CRANIOTOMY, EXCISE TUMOR, COMBINED Right 8/20/2020    Procedure: stealth assisted Frontotemporal craniotomy for tumor with stereotactic image guidance;  Surgeon: Deni De Paz MD;  Location: [facility] OR     RECESSION RESECTION WITH ADJUSTABLE SUTURE BILATERAL Bilateral 2/3/2020    Procedure: BILATERAL STRABISMUS REPAIR;  Surgeon: Mikhail Hernández MD;  Location: [facility] OR       Current Outpatient Medications   Medication     artificial tears OINT ophthalmic ointment     cimetidine (TAGAMET) 400 MG tablet     cyanocobalamin (VITAMIN B-12) 1000 MCG tablet     loratadine (CLARITIN) 10 MG tablet     sodium chloride (OCEAN) 0.65 % nasal spray     Vitamin D, Cholecalciferol, 400 units TABS     Current Facility-Administered Medications   Medication     lidocaine (LMX4) kit     lidocaine 1 % 0.1-1 mL     sodium chloride (PF) 0.9% PF flush 3 mL     sodium chloride (PF) 0.9% PF flush 3 mL       Allergies   Allergen Reactions     Sulfa Drugs Rash       History of Anesthesia/Sedation Problems: no    Physical Exam:    Mental status: alert  Heart: Normal  Lung:  Normal  Assessment of patient's airway: Normal  Other as pertinent for procedure: None     ASA Score: See Provation note    Mallampati score:  I - Faucial pillars, soft palate, and uvula are visible    Assessment/Plan:     The patient is an appropriate candidate to receive sedation.    Informed consent was discussed with the patient/family, including the risks, benefits, potential complications and any alternative options associated with sedation.    Patient assessment completed just prior to sedation and while under constant observation by the provider. Condition determined to be adequate for proceeding with sedation.    The specific risks for the procedure were discussed with the patient at the time of informed consent and include but are not limited to perforation which could require surgery, missing significant neoplasm or lesion, hemorrhage and adverse sedative complication.      Mukesh Silva MD

## 2022-07-25 PROCEDURE — 88342 IMHCHEM/IMCYTCHM 1ST ANTB: CPT | Mod: 26 | Performed by: PATHOLOGY

## 2022-07-25 PROCEDURE — 88305 TISSUE EXAM BY PATHOLOGIST: CPT | Mod: 26 | Performed by: PATHOLOGY

## 2022-07-26 ENCOUNTER — MYC MEDICAL ADVICE (OUTPATIENT)
Dept: FAMILY MEDICINE | Facility: CLINIC | Age: 67
End: 2022-07-26

## 2022-07-26 LAB
PATH REPORT.ADDENDUM SPEC: NORMAL
PATH REPORT.COMMENTS IMP SPEC: NORMAL
PATH REPORT.FINAL DX SPEC: NORMAL
PATH REPORT.GROSS SPEC: NORMAL
PATH REPORT.MICROSCOPIC SPEC OTHER STN: NORMAL
PATH REPORT.RELEVANT HX SPEC: NORMAL
PHOTO IMAGE: NORMAL

## 2022-08-15 ENCOUNTER — TELEPHONE (OUTPATIENT)
Dept: NEUROSURGERY | Facility: CLINIC | Age: 67
End: 2022-08-15

## 2022-10-10 ENCOUNTER — ANCILLARY PROCEDURE (OUTPATIENT)
Dept: MRI IMAGING | Facility: CLINIC | Age: 67
End: 2022-10-10
Attending: PHYSICIAN ASSISTANT
Payer: COMMERCIAL

## 2022-10-10 DIAGNOSIS — D32.9 MENINGIOMA (H): ICD-10-CM

## 2022-10-10 PROCEDURE — 70553 MRI BRAIN STEM W/O & W/DYE: CPT | Mod: TC | Performed by: RADIOLOGY

## 2022-10-10 PROCEDURE — A9585 GADOBUTROL INJECTION: HCPCS | Mod: JW | Performed by: RADIOLOGY

## 2022-10-10 RX ORDER — GADOBUTROL 604.72 MG/ML
0.1 INJECTION INTRAVENOUS ONCE
Status: COMPLETED | OUTPATIENT
Start: 2022-10-10 | End: 2022-10-10

## 2022-10-10 RX ADMIN — GADOBUTROL 9 ML: 604.72 INJECTION INTRAVENOUS at 08:27

## 2022-10-26 ENCOUNTER — ANCILLARY PROCEDURE (OUTPATIENT)
Dept: CT IMAGING | Facility: CLINIC | Age: 67
End: 2022-10-26
Attending: PHYSICIAN ASSISTANT
Payer: COMMERCIAL

## 2022-10-26 ENCOUNTER — OFFICE VISIT (OUTPATIENT)
Dept: NEUROSURGERY | Facility: CLINIC | Age: 67
End: 2022-10-26
Payer: COMMERCIAL

## 2022-10-26 VITALS
SYSTOLIC BLOOD PRESSURE: 148 MMHG | DIASTOLIC BLOOD PRESSURE: 92 MMHG | OXYGEN SATURATION: 97 % | RESPIRATION RATE: 16 BRPM | HEART RATE: 77 BPM

## 2022-10-26 DIAGNOSIS — D32.9 MENINGIOMA (H): Primary | ICD-10-CM

## 2022-10-26 DIAGNOSIS — Q28.3 VASCULAR ABNORMALITY OF BRAIN: ICD-10-CM

## 2022-10-26 PROCEDURE — 99213 OFFICE O/P EST LOW 20 MIN: CPT | Performed by: PHYSICIAN ASSISTANT

## 2022-10-26 PROCEDURE — 70496 CT ANGIOGRAPHY HEAD: CPT | Mod: TC | Performed by: RADIOLOGY

## 2022-10-26 RX ORDER — IOPAMIDOL 755 MG/ML
75 INJECTION, SOLUTION INTRAVASCULAR ONCE
Status: COMPLETED | OUTPATIENT
Start: 2022-10-26 | End: 2022-10-26

## 2022-10-26 RX ADMIN — IOPAMIDOL 75 ML: 755 INJECTION, SOLUTION INTRAVASCULAR at 12:27

## 2022-10-26 ASSESSMENT — PAIN SCALES - GENERAL: PAINLEVEL: NO PAIN (0)

## 2022-10-26 NOTE — PROGRESS NOTES
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 She underwent the above mentioned procedure with Dr. De Paz. She's feeling well and has no new complaints since our last visit 1 year ago. Vision is stable. No new headaches, paresthesias, or weakness.     Review of Systems:   Pertinent items are noted in HPI or as in patient entered ROS below, remainder of complete ROS is negative.     Physical Exam:   BP (!) 148/92   Pulse 77   Resp 16   LMP  (LMP Unknown)   SpO2 97%    General: No acute distress.    Eyes: Conjunctivae are normal.  MSK: Moves all extremities.  No obvious deformity.  Neuro: The patient is fully oriented. Speech is normal. Extraocular movements are intact without nystagmus. Facial nerve function is normal, rated as a House Brackmann 1. Gait is normal.  Psych: Normal mood and affect. Behavior is normal.      Imaging:  MRI done 10/10/2022 shows postsurgical changes with stable right frontal gliosis without evidence of tumor recurrence. There is stable focus of blooming susceptibility-related signal loss in right lateral pontomedullary junction.    \"IMPRESSION:  1. Stable postsurgical changes status post right-sided craniotomy for  resection of a homogeneously enhancing extra-axial mass arising from  the right anterior clinoid process. No findings of tumor recurrence.  2. Unchanged area of presumed gliosis/encephalomalacia involving the  right inferior frontal gyrus.  3. Redemonstrated small focus of extra-axial " "susceptibility-related  signal loss in the right ambient cistern, which has been present on  the MRI exams following the patient's surgery in August 2020, but  which was not definitely present on the preoperative MRI exams. This  is nonspecific (differential discussed above). An aneurysm cannot be  completely excluded. CTA of the head is suggested for further  evaluation.  4. No acute intracranial process.     MOOK GORMAN MD \"     Assessment:  1. 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 Signal abnormality in right lateral pontomedullary junction on MRI    Plan:  1. Reassuring MRI findings discussed today. Follow up in 1 year with repeat imaging prior to appointment. She was encouraged to reach out sooner with new questions/concerns.  2. Will repeat CTA for further evaluation. Discussed with Dr. De Paz. CTA on day of surgery was negative for aneurysm but this area was not present on preoperative MRI. I'll call her with results once complete. Discussed non-urgent evaluation plans with the patient who understands and is comfortable with above.        Margarita Mooney PA-C  Department of Neurosurgery  Center for Skull Base and Pituitary Surgery  [facility]        "

## 2022-10-26 NOTE — LETTER
"10/26/2022       RE: Neema Nixon  [address]  [place] [address]     Dear Colleague,    Thank you for referring your patient, Neema Nixon, to the [facility] [place] at [facility]. Please see a copy of my visit note below.      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 She underwent the above mentioned procedure with Dr. De Paz. She's feeling well and has no new complaints since our last visit 1 year ago. Vision is stable. No new headaches, paresthesias, or weakness.     Review of Systems:   Pertinent items are noted in HPI or as in patient entered ROS below, remainder of complete ROS is negative.     Physical Exam:   BP (!) 148/92   Pulse 77   Resp 16   LMP  (LMP Unknown)   SpO2 97%    General: No acute distress.    Eyes: Conjunctivae are normal.  MSK: Moves all extremities.  No obvious deformity.  Neuro: The patient is fully oriented. Speech is normal. Extraocular movements are intact without nystagmus. Facial nerve function is normal, rated as a House Brackmann 1. Gait is normal.  Psych: Normal mood and affect. Behavior is normal.      Imaging:  MRI done 10/10/2022 shows postsurgical changes with stable right frontal gliosis without evidence of tumor recurrence. There is stable focus of blooming susceptibility-related signal loss in right lateral pontomedullary junction.    \"IMPRESSION:  1. Stable postsurgical changes status post " "right-sided craniotomy for  resection of a homogeneously enhancing extra-axial mass arising from  the right anterior clinoid process. No findings of tumor recurrence.  2. Unchanged area of presumed gliosis/encephalomalacia involving the  right inferior frontal gyrus.  3. Redemonstrated small focus of extra-axial susceptibility-related  signal loss in the right ambient cistern, which has been present on  the MRI exams following the patient's surgery in August 2020, but  which was not definitely present on the preoperative MRI exams. This  is nonspecific (differential discussed above). An aneurysm cannot be  completely excluded. CTA of the head is suggested for further  evaluation.  4. No acute intracranial process.     MOOK GORMAN MD \"     Assessment:  1. 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 Signal abnormality in right lateral pontomedullary junction on MRI    Plan:  1. Reassuring MRI findings discussed today. Follow up in 1 year with repeat imaging prior to appointment. She was encouraged to reach out sooner with new questions/concerns.  2. Will repeat CTA for further evaluation. Discussed with Dr. De Paz. CTA on day of surgery was negative for aneurysm but this area was not present on preoperative MRI. I'll call her with results once complete. Discussed non-urgent evaluation plans with the patient who understands and is comfortable with above.        Margarita Mooney PA-C  Department of Neurosurgery  Center for Skull Base and Pituitary Surgery  [facility]  "

## 2022-10-26 NOTE — PATIENT INSTRUCTIONS
Follow up in 1 year with MRI prior to appointment (virtual or in person appointment ok).  I will call you with CTA results once I get them.    Please reach out sooner with new concerns.

## 2022-12-05 ENCOUNTER — OFFICE VISIT (OUTPATIENT)
Dept: FAMILY MEDICINE | Facility: CLINIC | Age: 67
End: 2022-12-05
Payer: COMMERCIAL

## 2022-12-05 VITALS
DIASTOLIC BLOOD PRESSURE: 80 MMHG | RESPIRATION RATE: 16 BRPM | SYSTOLIC BLOOD PRESSURE: 114 MMHG | BODY MASS INDEX: 32.3 KG/M2 | HEART RATE: 93 BPM | WEIGHT: 205.8 LBS | OXYGEN SATURATION: 94 % | TEMPERATURE: 98.3 F | HEIGHT: 67 IN

## 2022-12-05 DIAGNOSIS — H61.899 LESION OF EAR CANAL: Primary | ICD-10-CM

## 2022-12-05 DIAGNOSIS — H61.21 IMPACTED CERUMEN OF RIGHT EAR: ICD-10-CM

## 2022-12-05 PROCEDURE — 99213 OFFICE O/P EST LOW 20 MIN: CPT | Performed by: PHYSICIAN ASSISTANT

## 2022-12-05 NOTE — PROGRESS NOTES
"  Assessment & Plan     Lesion of ear canal  Impacted cerumen of right ear  Patient is a 67-year-old female who presents to clinic due to lesion present within ear canal x3 weeks.  Patient notes she did express purulent discharge from the area a few days ago.  Vital signs initially significant for tachycardia which patient attributes to being stressed about arriving late and improves during clinic visit.  Physical exam does show a pearly raised lesion without sign of infection at the outer/inferior aspect of the ear canal.  Discussed that this is likely resolving acne lesion.  Discussed importance of reaching out for follow-up if lesion does not improve/resolve over the next few weeks.  At that point, would consider referral to ENT.  Debrox prescribed for cerumen impaction.  Return and follow-up precautions provided.  - carbamide peroxide (DEBROX) 6.5 % otic solution; Place 5 drops into the right ear 2 times daily for 14 days    See Patient Instructions    Return in about 2 weeks (around 12/19/2022), or if symptoms worsen or fail to improve.    Thea Veronica PA-C  [facility] [place] is a 67 year old, presenting for the following health issues:  Sick      HPI     Symptom duration:  1 month   Sympom severity:  mild to moderate   Treatments tried:  had boil previously    Contacts:  na              Symptoms: Present Comment   Fever/Chills     Fatigue     Muscle Aches     Eye Issue     Sneezing     Nasal Mc/Drg     Sinus Pressure/Pain     Loss of smell     Dental pain     Sore Throat     Swollen Glands     Ear Pain/Fullness x Right ear-discharge, pain with touch   Cough     Wheeze     Shortness of breath     Rash     Headache     Stomach/GI issues     Other            Patient notes that around 3 weeks ago she developed a \"boil\" at the lower/outter aspect of the ear canal. She thought this would go away, but area is now more painful and pink appearing.  Patient did express some blood " "and pus from area approximately 3 days ago.  Pain around area has generally improved.          Objective    /80   Pulse (!) 129   Temp 98.3  F (36.8  C) (Temporal)   Resp 16   Ht 1.714 m (5' 7.48\")   Wt 93.4 kg (205 lb 12.8 oz)   LMP  (LMP Unknown)   SpO2 94%   BMI 31.78 kg/m    Body mass index is 31.78 kg/m .  Physical Exam  Vitals and nursing note reviewed.   Constitutional:       General: She is not in acute distress.     Appearance: Normal appearance.   HENT:      Head: Normocephalic and atraumatic.      Left Ear: Tympanic membrane, ear canal and external ear normal. There is no impacted cerumen.      Ears:      Comments: Right ear: Unable to visualize TM due to approximately 85% cerumen impaction.  Canal without generally swelling or erythema.  Patient has pearly appearing raised lesion at outer/inferior aspect of canal.  No pustule or surrounding erythema/induration.  Eyes:      Extraocular Movements: Extraocular movements intact.      Pupils: Pupils are equal, round, and reactive to light.   Cardiovascular:      Rate and Rhythm: Normal rate and regular rhythm.      Heart sounds: Normal heart sounds. No murmur heard.  Pulmonary:      Effort: Pulmonary effort is normal.      Breath sounds: Normal breath sounds.   Musculoskeletal:         General: Normal range of motion.      Cervical back: Normal range of motion.   Skin:     General: Skin is warm and dry.   Neurological:      General: No focal deficit present.      Mental Status: She is alert.   Psychiatric:         Mood and Affect: Mood normal.         Behavior: Behavior normal.                            "

## 2022-12-05 NOTE — PATIENT INSTRUCTIONS
The lesion at the outside of your ear canal is likely a pimple.  This should continue to heal up on its own over the next few weeks.  Please avoid picking or manipulating this area.  If the area gets worse or does not improve on its own within the next few weeks, please reach out for follow-up.    You do have earwax mostly blocking your ear canal.  For treatment you have been prescribed Debrox eardrops.  Please reach out with any questions or concerns.  Return to clinic for new or worsening symptoms.

## 2023-04-14 ENCOUNTER — OFFICE VISIT (OUTPATIENT)
Dept: OPHTHALMOLOGY | Facility: CLINIC | Age: 68
End: 2023-04-14
Attending: OPHTHALMOLOGY
Payer: COMMERCIAL

## 2023-04-14 DIAGNOSIS — H53.10 SUBJECTIVE VISUAL DISTURBANCE: Primary | ICD-10-CM

## 2023-04-14 DIAGNOSIS — D49.6 INTRACRANIAL TUMOR (H): ICD-10-CM

## 2023-04-14 PROCEDURE — 92083 EXTENDED VISUAL FIELD XM: CPT | Performed by: OPHTHALMOLOGY

## 2023-04-14 PROCEDURE — 92015 DETERMINE REFRACTIVE STATE: CPT

## 2023-04-14 PROCEDURE — 92014 COMPRE OPH EXAM EST PT 1/>: CPT | Performed by: OPHTHALMOLOGY

## 2023-04-14 PROCEDURE — G0463 HOSPITAL OUTPT CLINIC VISIT: HCPCS | Mod: 25 | Performed by: OPHTHALMOLOGY

## 2023-04-14 PROCEDURE — 92133 CPTRZD OPH DX IMG PST SGM ON: CPT | Performed by: OPHTHALMOLOGY

## 2023-04-14 PROCEDURE — 92083 EXTENDED VISUAL FIELD XM: CPT | Mod: 26 | Performed by: OPHTHALMOLOGY

## 2023-04-14 ASSESSMENT — VISUAL ACUITY
METHOD: SNELLEN - LINEAR
OS_CC: 20/40
OS_CC+: -2
OD_CC+: -2
OD_PH_CC: 20/40
CORRECTION_TYPE: GLASSES
OD_CC: 20/40

## 2023-04-14 ASSESSMENT — CONF VISUAL FIELD
OS_INFERIOR_NASAL_RESTRICTION: 0
OD_SUPERIOR_NASAL_RESTRICTION: 0
METHOD: COUNTING FINGERS
OD_INFERIOR_TEMPORAL_RESTRICTION: 0
OS_INFERIOR_TEMPORAL_RESTRICTION: 0
OD_NORMAL: 1
OS_NORMAL: 1
OD_SUPERIOR_TEMPORAL_RESTRICTION: 0
OS_SUPERIOR_NASAL_RESTRICTION: 0
OS_SUPERIOR_TEMPORAL_RESTRICTION: 0
OD_INFERIOR_NASAL_RESTRICTION: 0

## 2023-04-14 ASSESSMENT — REFRACTION_MANIFEST
OS_SPHERE: -12.50
OD_CYLINDER: +0.50
OD_ADD: +2.75
OS_AXIS: 035
OS_CYLINDER: +1.50
OD_AXIS: 010
OS_ADD: +2.75
OD_SPHERE: -11.75

## 2023-04-14 ASSESSMENT — TONOMETRY
IOP_METHOD: ICARE
OD_IOP_MMHG: 12
OS_IOP_MMHG: 12

## 2023-04-14 ASSESSMENT — SLIT LAMP EXAM - LIDS
COMMENTS: MGD
COMMENTS: MGD

## 2023-04-14 ASSESSMENT — REFRACTION_WEARINGRX
OS_CYLINDER: +1.00
OS_ADD: +2.50
OD_AXIS: 010
OD_ADD: +2.50
OS_SPHERE: -12.00
OS_AXIS: 035
OD_SPHERE: -11.00
OD_CYLINDER: +0.25

## 2023-04-14 ASSESSMENT — EXTERNAL EXAM - LEFT EYE: OS_EXAM: NORMAL

## 2023-04-14 ASSESSMENT — EXTERNAL EXAM - RIGHT EYE: OD_EXAM: NORMAL

## 2023-04-14 NOTE — NURSING NOTE
Chief Complaint(s) and History of Present Illness(es)     Annual Eye Exam    In both eyes.  Since onset it is stable.  Associated symptoms include floaters.  Negative for double vision, eye pain and flashes. Additional comments: Neema Nixon is a 67 year old year old female with the following diagnoses:  1.  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  No eye pain or double vision.  She has floaters but nothing new.  ADRIANNA Damico 4/14/2023 11:00 AM

## 2023-04-14 NOTE — LETTER
2023    RE: Neema Nixon  : 1955  MRN: [id number]    Dear Providers,    I saw our mutual patient, Neema Nixon, in follow-up in my clinic recently.  After a thorough neuro-ophthalmic history and examination, I came to the following conclusions:     1. 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   Doing very well after surgical resection by Dr. De Paz on 20 with most recent MRI in 10/2022 showing no recurrence or residual tumor.  Her retinal nerve fiber layer is stable on today's exam.  Return in 1 year with Dr. Jerez for follow-up.    2. 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 Vision stable and patient asymptomatic. Observe for now.  Call immediately for any worsening of vision.    3. Epiretinal membrane, right eye. Vision stable and patient asymptomatic. Observe for now.  If best corrected visual acuity falls below 20/40 or there is significant progression epiretinal membrane with contraction / macular edema then would refer to retina.  No indication for surgery at this time.     Return in 1 year with Dr. Jerez for follow-up.      Historical data:    2019 MRI brain with and without contrast:  IMPRESSION: Ovoid uniformly enhancing 1.4 cm extra-axial dural-based  mass positioned along the superior aspect of the right anterior  clinoid process, most likely representing a meningioma.    20 MRI brain with and without IV contrast   IMPRESSION: Interval increase in size of extra-axial enhancing mass  arising superiorly off the anterior clinoid process. The mass, which  most likely represents a meningioma, currently measures 1.7 cm in  greatest dimensions as compared to 1.4 cm on the prior study. There is  also some new edema in the adjacent brain parenchyma.     20:MRI brain and orbits with and without IV  contrast   Impression:  Expected early postsurgical changes from right sphenoid wing  meningioma resection with a small amount of extra-axial collection and  pneumocephalus in the right frontal lobe.    20 patient underwent surgery with Dr. De Paz:    1.  Right frontotemporal craniotomy for resection of intradural tumor.   2.  Right optic-clinoidal osteotomy for approach for the anterior fossa  3.  Use of intraoperative microscope.   4.  Use of intraoperative Stealth neuronavigation.   5.  Insertion of lumbar drain.      Pathology showed a grade 1 meningioma.  Patient has follow-up with Dr. De Paz on 20.    Most recent MRI 2020 and read by radiology as showin. Postoperative changes. Right frontal-temporal craniotomy. Enhancing  tissue in the right temporal and infratemporal fossa.  2. No evidence for any residual or recurrent tumor in the region of  the right anterior clinoid process.      MRI brain [facility] contrast 10/8/21  IMPRESSION:    1.  Postsurgical changes right frontal craniotomy for resection of  meningioma adjacent to the clinoid process, unchanged. No evidence of  residual recurrent tumor in the region of the anterior clinoid  process.  2.  No evidence of acute intracranial hemorrhage, mass effect, or  Infarction.    Interim history and data since visit 22    Patient reports her vision has been largely stable since her last visit.  She denies headaches, double vision, or other new neurologic symptoms.     MRI brain [facility] on 10/10/2022 demonstrated stable findings status post right-sided craniotomy, no evidence of tumor recurrence.  I have independently reviewed the study and concur, no evidence of compression along the right optic pathway.    Last visit with neurosurgery was 10/26/2022.    Assessment:  1. 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 Signal abnormality in right lateral pontomedullary junction on  MRI     Plan:  1. Reassuring MRI findings discussed today. Follow up in 1 year with repeat imaging prior to appointment. She was encouraged to reach out sooner with new questions/concerns.  2. Will repeat CTA for further evaluation. Discussed with Dr. De Paz. CTA on day of surgery was negative for aneurysm but this area was not present on preoperative MRI. I'll call her with results once complete. Discussed non-urgent evaluation plans with the patient who understands and is comfortable with above.      CTA head 10/26/2022 (to evaluate signal abnormality around the right lateral pontomedullary junction)    IMPRESSION:  1.  No aneurysm or other vascular abnormality is evident within the  right ambient cistern to correspond with the abnormality noted on the  recent brain MRI.  2.  Band of soft tissue traversing the distal cervical right internal  carotid artery just below the skull base may reflect a chronic  dissection or web, although the appearance is not specific. In either  case, it is unchanged versus 8/20/2020.      Corrected distance visual acuity was 20/40 -2 in the right eye and 20/40 -2 in the left eye. Intraocular pressure was 12 in the right eye and 12 in the left eye using ICare.  Color vision 11/11 right eye and 11/11 left eye.  Pupils isocoric without afferent pupillary defect.  Anterior segment and fundus exam stable to previous.     Octopus automated 30 degree visual field revealed stable, non-specific changes in the right eye and a full-field in the left eye.     OCT of the optic nerve head stable thinning of right nerve with mean of 78 (77 prior). Left eye stable and normal compared to age-matched controls.        For further exam details, please feel free to contact our office for additional records.  If you wish to contact me regarding this patient please email me at [email or web address] or give my clinic a call to arrange a phone conversation.    Sincerely,    Mikhail Hernández MD  Associate  Professor, Neuro-Ophthalmology and Adult Strabismus Surgery  The Jaclyn Rodrigues Chair in Neuro-Ophthalmology  Department of Ophthalmology and Visual Neurosciences  [facility]

## 2023-04-14 NOTE — PROGRESS NOTES
1. 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   Doing very well after surgical resection by Dr. De Paz on 8/20/20 with most recent MRI in 10/2022 showing no recurrence or residual tumor.  Her retinal nerve fiber layer is stable on today's exam.  Return in 1 year with Dr. Jerez for follow-up.    2. 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 Vision stable and patient asymptomatic. Observe for now.  Call immediately for any worsening of vision.    3. Epiretinal membrane, right eye. Vision stable and patient asymptomatic. Observe for now.  If best corrected visual acuity falls below 20/40 or there is significant progression epiretinal membrane with contraction / macular edema then would refer to retina.  No indication for surgery at this time.     Return in 1 year with Dr. Jerez for follow-up.      Historical data:    08/2019 MRI brain with and without contrast:  IMPRESSION: Ovoid uniformly enhancing 1.4 cm extra-axial dural-based  mass positioned along the superior aspect of the right anterior  clinoid process, most likely representing a meningioma.    06/24/20 MRI brain with and without IV contrast   IMPRESSION: Interval increase in size of extra-axial enhancing mass  arising superiorly off the anterior clinoid process. The mass, which  most likely represents a meningioma, currently measures 1.7 cm in  greatest dimensions as compared to 1.4 cm on the prior study. There is  also some new edema in the adjacent brain parenchyma.     8/20/20:MRI brain and orbits with and without IV contrast   Impression:  Expected early postsurgical changes from right sphenoid wing  meningioma resection with a small amount of extra-axial collection and  pneumocephalus in the right frontal lobe.    8/20/20 patient underwent surgery with Dr. De Paz:    1.  Right  frontotemporal craniotomy for resection of intradural tumor.   2.  Right optic-clinoidal osteotomy for approach for the anterior fossa  3.  Use of intraoperative microscope.   4.  Use of intraoperative Stealth neuronavigation.   5.  Insertion of lumbar drain.      Pathology showed a grade 1 meningioma.  Patient has follow-up with Dr. De Paz on 20.    Most recent MRI 2020 and read by radiology as showin. Postoperative changes. Right frontal-temporal craniotomy. Enhancing  tissue in the right temporal and infratemporal fossa.  2. No evidence for any residual or recurrent tumor in the region of  the right anterior clinoid process.      MRI brain wwo contrast 10/8/21  IMPRESSION:    1.  Postsurgical changes right frontal craniotomy for resection of  meningioma adjacent to the clinoid process, unchanged. No evidence of  residual recurrent tumor in the region of the anterior clinoid  process.  2.  No evidence of acute intracranial hemorrhage, mass effect, or  Infarction.    Interim history and data since visit 22    Patient reports her vision has been largely stable since her last visit.  She denies headaches, double vision, or other new neurologic symptoms.     MRI brain WWO on 10/10/2022 demonstrated stable findings status post right-sided craniotomy, no evidence of tumor recurrence.  I have independently reviewed the study and concur, no evidence of compression along the right optic pathway.    Last visit with neurosurgery was 10/26/2022.    Assessment:  1. 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 Signal abnormality in right lateral pontomedullary junction on MRI     Plan:  1. Reassuring MRI findings discussed today. Follow up in 1 year with repeat imaging prior to appointment. She was encouraged to reach out sooner with new questions/concerns.  2. Will repeat CTA for further evaluation. Discussed with Dr. De Paz. CTA on day  of surgery was negative for aneurysm but this area was not present on preoperative MRI. I'll call her with results once complete. Discussed non-urgent evaluation plans with the patient who understands and is comfortable with above.      CTA head 10/26/2022 (to evaluate signal abnormality around the right lateral pontomedullary junction)    IMPRESSION:  1.  No aneurysm or other vascular abnormality is evident within the  right ambient cistern to correspond with the abnormality noted on the  recent brain MRI.  2.  Band of soft tissue traversing the distal cervical right internal  carotid artery just below the skull base may reflect a chronic  dissection or web, although the appearance is not specific. In either  case, it is unchanged versus 8/20/2020.      Corrected distance visual acuity was 20/40 -2 in the right eye and 20/40 -2 in the left eye. Intraocular pressure was 12 in the right eye and 12 in the left eye using ICare.  Color vision 11/11 right eye and 11/11 left eye.  Pupils isocoric without afferent pupillary defect.  Anterior segment and fundus exam stable to previous.     Octopus automated 30 degree visual field revealed stable, non-specific changes in the right eye and a full-field in the left eye.     OCT of the optic nerve head stable thinning of right nerve with mean of 78 (77 prior). Left eye stable and normal compared to age-matched controls.       Complete documentation of historical and exam elements from today's encounter can be found in the full encounter summary report (not reduplicated in this progress note).  I personally obtained the chief complaint(s) and history of present illness.  I confirmed and edited as necessary the review of systems, past medical/surgical history, family history, social history, and examination findings as documented by others; and I examined the patient myself.  I personally reviewed the relevant tests, images, and reports as documented above.  I formulated and  edited as necessary the assessment and plan and discussed the findings and management plan with the patient and family.  I personally reviewed the ophthalmic test(s) associated with this encounter, agree with the interpretation(s) as documented by the resident/fellow, and have edited the corresponding report(s) as necessary.     MD Mina Guidry MD PhD  Fellow, Neuro-Ophthalmology    Geri Jerez, OD

## 2023-05-25 ENCOUNTER — ANCILLARY ORDERS (OUTPATIENT)
Dept: FAMILY MEDICINE | Facility: CLINIC | Age: 68
End: 2023-05-25

## 2023-05-25 DIAGNOSIS — Z12.31 ENCOUNTER FOR SCREENING MAMMOGRAM FOR MALIGNANT NEOPLASM OF BREAST: ICD-10-CM

## 2023-06-06 ENCOUNTER — ANCILLARY ORDERS (OUTPATIENT)
Dept: FAMILY MEDICINE | Facility: CLINIC | Age: 68
End: 2023-06-06

## 2023-06-06 ENCOUNTER — ANCILLARY PROCEDURE (OUTPATIENT)
Dept: MAMMOGRAPHY | Facility: CLINIC | Age: 68
End: 2023-06-06
Attending: PHYSICIAN ASSISTANT
Payer: COMMERCIAL

## 2023-06-06 DIAGNOSIS — Z12.31 ENCOUNTER FOR SCREENING MAMMOGRAM FOR MALIGNANT NEOPLASM OF BREAST: ICD-10-CM

## 2023-06-06 PROCEDURE — 77067 SCR MAMMO BI INCL CAD: CPT | Mod: TC | Performed by: STUDENT IN AN ORGANIZED HEALTH CARE EDUCATION/TRAINING PROGRAM

## 2023-06-09 ENCOUNTER — TELEPHONE (OUTPATIENT)
Dept: FAMILY MEDICINE | Facility: CLINIC | Age: 68
End: 2023-06-09
Payer: COMMERCIAL

## 2023-06-09 NOTE — TELEPHONE ENCOUNTER
Reason for Call:  Appointment Request    Patient requesting this type of appt:  Office visit    Requested provider: Cherry Wood    Reason patient unable to be scheduled: Not within requested timeframe    When does patient want to be seen/preferred time: 3-7 days    Comments: patient is requesting work in for some type of growth on her hands that she would like looked at    Could we send this information to you in [facility] or would you prefer to receive a phone call?:   Patient would prefer a phone call   Okay to leave a detailed message?: Yes at Cell number on file:    Telephone Information:   Mobile [phone]       Call taken on 6/9/2023 at 2:57 PM by Yin Molina

## 2023-07-19 ASSESSMENT — ENCOUNTER SYMPTOMS
BREAST MASS: 0
SORE THROAT: 0
COUGH: 0
DIARRHEA: 0
HEMATURIA: 0
MYALGIAS: 0
PARESTHESIAS: 0
NAUSEA: 0
ARTHRALGIAS: 1
ABDOMINAL PAIN: 0
DYSURIA: 0
WEAKNESS: 0
SHORTNESS OF BREATH: 0
NERVOUS/ANXIOUS: 0
JOINT SWELLING: 0
EYE PAIN: 0
HEARTBURN: 0
CHILLS: 0
FEVER: 0
DIZZINESS: 0
HEADACHES: 0
CONSTIPATION: 0
FREQUENCY: 0
PALPITATIONS: 0
HEMATOCHEZIA: 0

## 2023-07-19 ASSESSMENT — ACTIVITIES OF DAILY LIVING (ADL): CURRENT_FUNCTION: NO ASSISTANCE NEEDED

## 2023-07-26 ENCOUNTER — OFFICE VISIT (OUTPATIENT)
Dept: FAMILY MEDICINE | Facility: CLINIC | Age: 68
End: 2023-07-26
Payer: COMMERCIAL

## 2023-07-26 ENCOUNTER — MYC MEDICAL ADVICE (OUTPATIENT)
Dept: FAMILY MEDICINE | Facility: CLINIC | Age: 68
End: 2023-07-26

## 2023-07-26 VITALS
HEIGHT: 67 IN | BODY MASS INDEX: 30.92 KG/M2 | TEMPERATURE: 98.9 F | WEIGHT: 197 LBS | RESPIRATION RATE: 16 BRPM | SYSTOLIC BLOOD PRESSURE: 130 MMHG | OXYGEN SATURATION: 97 % | DIASTOLIC BLOOD PRESSURE: 86 MMHG | HEART RATE: 59 BPM

## 2023-07-26 DIAGNOSIS — Z00.00 ENCOUNTER FOR MEDICARE ANNUAL WELLNESS EXAM: Primary | ICD-10-CM

## 2023-07-26 DIAGNOSIS — E53.8 VITAMIN B12 DEFICIENCY (NON ANEMIC): ICD-10-CM

## 2023-07-26 DIAGNOSIS — L98.0 PYOGENIC GRANULOMA OF SKIN: ICD-10-CM

## 2023-07-26 DIAGNOSIS — Z23 NEED FOR PROPHYLACTIC VACCINATION WITH COMBINED DIPHTHERIA-TETANUS-PERTUSSIS (DTP) VACCINE: ICD-10-CM

## 2023-07-26 DIAGNOSIS — K21.00 GASTROESOPHAGEAL REFLUX DISEASE WITH ESOPHAGITIS WITHOUT HEMORRHAGE: ICD-10-CM

## 2023-07-26 LAB — VIT B12 SERPL-MCNC: 966 PG/ML (ref 232–1245)

## 2023-07-26 PROCEDURE — 99214 OFFICE O/P EST MOD 30 MIN: CPT | Mod: 25 | Performed by: PHYSICIAN ASSISTANT

## 2023-07-26 PROCEDURE — 90471 IMMUNIZATION ADMIN: CPT | Performed by: PHYSICIAN ASSISTANT

## 2023-07-26 PROCEDURE — G0439 PPPS, SUBSEQ VISIT: HCPCS | Performed by: PHYSICIAN ASSISTANT

## 2023-07-26 PROCEDURE — 36415 COLL VENOUS BLD VENIPUNCTURE: CPT | Performed by: PHYSICIAN ASSISTANT

## 2023-07-26 PROCEDURE — 90715 TDAP VACCINE 7 YRS/> IM: CPT | Performed by: PHYSICIAN ASSISTANT

## 2023-07-26 PROCEDURE — 82607 VITAMIN B-12: CPT | Performed by: PHYSICIAN ASSISTANT

## 2023-07-26 RX ORDER — IMIQUIMOD 12.5 MG/.25G
CREAM TOPICAL
Qty: 12 PACKET | Refills: 0 | Status: SHIPPED | OUTPATIENT
Start: 2023-07-26 | End: 2023-12-19

## 2023-07-26 ASSESSMENT — ENCOUNTER SYMPTOMS
DYSURIA: 0
SORE THROAT: 0
DIARRHEA: 0
EYE PAIN: 0
DIZZINESS: 0
ARTHRALGIAS: 1
HEADACHES: 0
FEVER: 0
CONSTIPATION: 0
PALPITATIONS: 0
WEAKNESS: 0
HEARTBURN: 0
COUGH: 0
ABDOMINAL PAIN: 0
FREQUENCY: 0
NAUSEA: 0
PARESTHESIAS: 0
HEMATOCHEZIA: 0
SHORTNESS OF BREATH: 0
HEMATURIA: 0
NERVOUS/ANXIOUS: 0
JOINT SWELLING: 0
MYALGIAS: 0
BREAST MASS: 0
CHILLS: 0

## 2023-07-26 ASSESSMENT — ACTIVITIES OF DAILY LIVING (ADL): CURRENT_FUNCTION: NO ASSISTANCE NEEDED

## 2023-07-26 NOTE — PROGRESS NOTES
"SUBJECTIVE:   Neema is a 68 year old who presents for Preventive Visit.       No data to display              Are you in the first 12 months of your Medicare coverage?  No    Healthy Habits:     In general, how would you rate your overall health?  Good    Frequency of exercise:  4-5 days/week    Duration of exercise:  30-45 minutes    Do you usually eat at least 4 servings of fruit and vegetables a day, include whole grains    & fiber and avoid regularly eating high fat or \"junk\" foods?  Yes    Taking medications regularly:  Yes    Medication side effects:  None    Ability to successfully perform activities of daily living:  No assistance needed    Home Safety:  No safety concerns identified    Hearing Impairment:  No hearing concerns    In the past 6 months, have you been bothered by leaking of urine?  No    In general, how would you rate your overall mental or emotional health?  Excellent    Additional concerns today:  Yes    Has a bump on finger for 6-8 weeks.  No pain or itching.  Has appt with derm in Sept.       Patient arrived for Annual Medicare Physical. Patient is undressing for breast exam. Patient is fasting for labs.     Have you ever done Advance Care Planning? (For example, a Health Directive, POLST, or a discussion with a medical provider or your loved ones about your wishes): Yes, patient states has an Advance Care Planning document and will bring a copy to the clinic.       Fall risk  Fallen 2 or more times in the past year?: No  Any fall with injury in the past year?: No    Cognitive Screening   1) Repeat 3 items (Leader, Season, Table)    2) Clock draw: NORMAL  3) 3 item recall: Recalls 3 objects  Results: 3 items recalled: COGNITIVE IMPAIRMENT LESS LIKELY    Mini-CogTM Copyright GUY Rodriguez. Licensed by the author for use in [facility]; reprinted with permission ([email or web address]). All rights reserved.      Do you have sleep apnea, excessive snoring or daytime drowsiness? : no    Reviewed " and updated as needed this visit by clinical staff                  Reviewed and updated as needed this visit by Provider                 Social History     Tobacco Use     Smoking status: Never     Smokeless tobacco: Never   Substance Use Topics     Alcohol use: Not Currently     Comment: minor             7/19/2023     9:52 AM   Alcohol Use   Prescreen: >3 drinks/day or >7 drinks/week? No     Do you have a current opioid prescription? No  Do you use any other controlled substances or medications that are not prescribed by a provider? None              Current providers sharing in care for this patient include:   Patient Care Team:  Cherry Wood PA-C as PCP - General (Family Medicine)  Mikhail Hernández MD as MD (Ophthalmology)  Mikhail Hernández MD as Assigned Surgical Provider  Margarita Mooney PA-C as Assigned Neuroscience Provider  Cherry Wood PA-C as Assigned PCP    The following health maintenance items are reviewed in Epic and correct as of today:  Health Maintenance   Topic Date Due     DTAP/TDAP/TD IMMUNIZATION (2 - Td or Tdap) 10/28/2022     COVID-19 Vaccine (6 - Pfizer series) 01/20/2023     MEDICARE ANNUAL WELLNESS VISIT  06/16/2023     ANNUAL REVIEW OF HM ORDERS  06/16/2023     INFLUENZA VACCINE (1) 09/01/2023     EYE EXAM  04/14/2024     MAMMO SCREENING  06/06/2024     FALL RISK ASSESSMENT  07/26/2024     LIPID  04/28/2026     ADVANCE CARE PLANNING  06/16/2027     DEXA  06/24/2027     COLORECTAL CANCER SCREENING  07/27/2031     HEPATITIS C SCREENING  Completed     PHQ-2 (once per calendar year)  Completed     Pneumococcal Vaccine: 65+ Years  Completed     ZOSTER IMMUNIZATION  Completed     IPV IMMUNIZATION  Aged Out     MENINGITIS IMMUNIZATION  Aged Out     Labs reviewed in EPIC  BP Readings from Last 3 Encounters:   07/26/23 (!) 136/92   12/05/22 114/80   10/26/22 (!) 148/92    Wt Readings from Last 3 Encounters:   07/26/23 89.4 kg (197 lb)   12/05/22 93.4 kg (205  "lb 12.8 oz)   07/14/22 88.5 kg (195 lb)                          Review of Systems   Constitutional:  Negative for chills and fever.   HENT:  Negative for congestion, ear pain, hearing loss and sore throat.    Eyes:  Negative for pain and visual disturbance.   Respiratory:  Negative for cough and shortness of breath.    Cardiovascular:  Negative for chest pain, palpitations and peripheral edema.   Gastrointestinal:  Negative for abdominal pain, constipation, diarrhea, heartburn, hematochezia and nausea.   Breasts:  Negative for tenderness, breast mass and discharge.   Genitourinary:  Negative for dysuria, frequency, genital sores, hematuria, pelvic pain, urgency, vaginal bleeding and vaginal discharge.   Musculoskeletal:  Positive for arthralgias. Negative for joint swelling and myalgias.   Skin:  Negative for rash.   Neurological:  Negative for dizziness, weakness, headaches and paresthesias.   Psychiatric/Behavioral:  Negative for mood changes. The patient is not nervous/anxious.      Constitutional, HEENT, cardiovascular, pulmonary, GI, , musculoskeletal, neuro, skin, endocrine and psych systems are negative, except as otherwise noted.    OBJECTIVE:   LMP  (LMP Unknown)  Estimated body mass index is 31.78 kg/m  as calculated from the following:    Height as of 12/5/22: 1.714 m (5' 7.48\").    Weight as of 12/5/22: 93.4 kg (205 lb 12.8 oz).  Physical Exam  GENERAL APPEARANCE: healthy, alert and no distress  EYES: Eyes grossly normal to inspection, PERRL and conjunctivae and sclerae normal  HENT: ear canals and TM's normal, nose and mouth without ulcers or lesions, oropharynx clear and oral mucous membranes moist  NECK: no adenopathy, no asymmetry, masses, or scars and thyroid normal to palpation  RESP: lungs clear to auscultation - no rales, rhonchi or wheezes  BREAST: normal without masses, tenderness or nipple discharge and no palpable axillary masses or adenopathy  CV: regular rate and rhythm, normal S1 S2, no " S3 or S4, no murmur, click or rub, no peripheral edema and peripheral pulses strong  ABDOMEN: soft, nontender, no hepatosplenomegaly, no masses and bowel sounds normal  MS: no musculoskeletal defects are noted and gait is age appropriate without ataxia  SKIN: no suspicious lesions or rashes, raised red growth  noted on left pointer finger .  NEURO: Normal strength and tone, sensory exam grossly normal, mentation intact and speech normal  PSYCH: mentation appears normal and affect normal/bright    Diagnostic Test Results:  Labs reviewed in Epic    ASSESSMENT / PLAN:   (Z00.00) Encounter for Medicare annual wellness exam  (primary encounter diagnosis)  Comment:      HEALTH CARE MAINTENANCE              Reviewed USPTF recommendations and anticipatory guidance.              See orders.   Due for Tdap    (Z23) Need for prophylactic vaccination with combined diphtheria-tetanus-pertussis (DTP) vaccine  Comment: due  Plan: DISCONTINUED: Tdap, tetanus-diptheria-acell         pertussis, (BOOSTRIX) 5-2.5-18.5 LF-MCG/0.5         BEAU injection            (E53.8) Vitamin B12 deficiency (non anemic)  Comment: Last B12 was elevated, she has since cut down to taking it every 3-4 days.  Will recheck level today.    Plan: Vitamin B12            (K21.00) Gastroesophageal reflux disease with esophagitis without hemorrhage  Comment: doing well on OTC tagamet as needed and avoids acid foods as much as possible (especially tomato based).   Plan:     (L98.0) Pyogenic granuloma of skin        Discussed the benign etiology of this growth with patient as well as various treatment options.  Will begin treatment today with Aldara cream, apply 3x/week over the next 2 weeks, this treatment usually results in 50% cure rate.  If not, she may f/u with derm for removal.         Plan:     Patient has been advised of split billing requirements and indicates understanding: Yes      COUNSELING:  Reviewed preventive health counseling, as reflected in  "patient instructions       Regular exercise       Healthy diet/nutrition       Fall risk prevention      BMI:   Estimated body mass index is 31.78 kg/m  as calculated from the following:    Height as of 12/5/22: 1.714 m (5' 7.48\").    Weight as of 12/5/22: 93.4 kg (205 lb 12.8 oz).   Weight management plan: Discussed healthy diet and exercise guidelines      She reports that she has never smoked. She has never used smokeless tobacco.      Appropriate preventive services were discussed with this patient, including applicable screening as appropriate for cardiovascular disease, diabetes, osteopenia/osteoporosis, and glaucoma.  As appropriate for age/gender, discussed screening for colorectal cancer, prostate cancer, breast cancer, and cervical cancer. Checklist reviewing preventive services available has been given to the patient.    Reviewed patients plan of care and provided an AVS. The Intermediate Care Plan ( asthma action plan, low back pain action plan, and migraine action plan) for Neema meets the Care Plan requirement. This Care Plan has been established and reviewed with the Patient.          Cherry Wood PA-C  [facility]    Identified Health Risks:  I have reviewed Opioid Use Disorder and Substance Use Disorder risk factors and made any needed referrals.   "

## 2023-07-26 NOTE — PATIENT INSTRUCTIONS
Patient Education   Personalized Prevention Plan  You are due for the preventive services outlined below.  Your care team is available to assist you in scheduling these services.  If you have already completed any of these items, please share that information with your care team to update in your medical record.  Health Maintenance Due   Topic Date Due     Diptheria Tetanus Pertussis (DTAP/TDAP/TD) Vaccine (2 - Td or Tdap) 10/28/2022     COVID-19 Vaccine (6 - Pfizer series) 01/20/2023     Annual Wellness Visit  06/16/2023     ANNUAL REVIEW OF HM ORDERS  06/16/2023

## 2023-07-26 NOTE — NURSING NOTE
Prior to immunization administration, verified patients identity using patient s name and date of birth. Please see Immunization Activity for additional information.     Screening Questionnaire for Adult Immunization    Are you sick today?   No   Do you have allergies to medications, food, a vaccine component or latex?   No   Have you ever had a serious reaction after receiving a vaccination?   No   Do you have a long-term health problem with heart, lung, kidney, or metabolic disease (e.g., diabetes), asthma, a blood disorder, no spleen, complement component deficiency, a cochlear implant, or a spinal fluid leak?  Are you on long-term aspirin therapy?   No   Do you have cancer, leukemia, HIV/AIDS, or any other immune system problem?   No   Do you have a parent, brother, or sister with an immune system problem?   No   In the past 3 months, have you taken medications that affect  your immune system, such as prednisone, other steroids, or anticancer drugs; drugs for the treatment of rheumatoid arthritis, Crohn s disease, or psoriasis; or have you had radiation treatments?   No   Have you had a seizure, or a brain or other nervous system problem?   No   During the past year, have you received a transfusion of blood or blood    products, or been given immune (gamma) globulin or antiviral drug?   No   For women: Are you pregnant or is there a chance you could become       pregnant during the next month?   No   Have you received any vaccinations in the past 4 weeks?   No     Immunization questionnaire answers were all negative.      Patient instructed to remain in clinic for 15 minutes afterwards, and to report any adverse reactions.     Screening performed by Gaby Mauricio MA on 7/26/2023 at 8:30 AM.

## 2023-12-19 ENCOUNTER — E-VISIT (OUTPATIENT)
Dept: URGENT CARE | Facility: CLINIC | Age: 68
End: 2023-12-19
Payer: COMMERCIAL

## 2023-12-19 DIAGNOSIS — N39.0 ACUTE UTI (URINARY TRACT INFECTION): Primary | ICD-10-CM

## 2023-12-19 PROCEDURE — 99421 OL DIG E/M SVC 5-10 MIN: CPT | Performed by: EMERGENCY MEDICINE

## 2023-12-19 RX ORDER — NITROFURANTOIN 25; 75 MG/1; MG/1
100 CAPSULE ORAL 2 TIMES DAILY
Qty: 10 CAPSULE | Refills: 0 | Status: SHIPPED | OUTPATIENT
Start: 2023-12-19 | End: 2023-12-24

## 2023-12-19 NOTE — PATIENT INSTRUCTIONS
Dear Neema Nixon    After reviewing your responses, I've been able to diagnose you with a urinary tract infection, which is a common infection of the bladder with bacteria.  This is not a sexually transmitted infection, though urinating immediately after intercourse can help prevent infections.  Drinking lots of fluids is also helpful to clear your current infection and prevent the next one.      I have sent a prescription for antibiotics to your pharmacy to treat this infection.    It is important that you take all of your prescribed medication even if your symptoms are improving after a few doses.  Taking all of your medicine helps prevent the symptoms from returning.     If your symptoms worsen, you develop pain in your back or stomach, develop fevers, or are not improving in 5 days, please contact your primary care provider for an appointment or visit any of our convenient Walk-in or Urgent Care Centers to be seen, which can be found on our website here.    Thanks again for choosing us as your health care partner,    Jeff Saunders MD  Urinary Tract Infection (UTI) in Women: Care Instructions  Overview     A urinary tract infection, or UTI, is a general term for an infection anywhere between the kidneys and the urethra (where urine comes out). Most UTIs are bladder infections. They often cause pain or burning when you urinate.  UTIs are caused by bacteria and can be cured with antibiotics. Be sure to complete your treatment so that the infection does not get worse.  Follow-up care is a key part of your treatment and safety. Be sure to make and go to all appointments, and call your doctor if you are having problems. It's also a good idea to know your test results and keep a list of the medicines you take.  How can you care for yourself at home?    Take your antibiotics as directed. Do not stop taking them just because you feel better. You need to take the full course of antibiotics.    Drink extra water  "and other fluids for the next day or two. This will help make the urine less concentrated and help wash out the bacteria that are causing the infection. (If you have kidney, heart, or liver disease and have to limit fluids, talk with your doctor before you increase the amount of fluids you drink.)    Avoid drinks that are carbonated or have caffeine. They can irritate the bladder.    Urinate often. Try to empty your bladder each time.    To relieve pain, take a hot bath or lay a heating pad set on low over your lower belly or genital area. Never go to sleep with a heating pad in place.  To prevent UTIs    Drink plenty of water each day. This helps you urinate often, which clears bacteria from your system. (If you have kidney, heart, or liver disease and have to limit fluids, talk with your doctor before you increase the amount of fluids you drink.)    Urinate when you need to.    If you are sexually active, urinate right after you have sex.    Change sanitary pads often.    Avoid douches, bubble baths, feminine hygiene sprays, and other feminine hygiene products that have deodorants.    After going to the bathroom, wipe from front to back.  When should you call for help?   Call your doctor now or seek immediate medical care if:      Symptoms such as fever, chills, nausea, or vomiting get worse or appear for the first time.       You have new pain in your back just below your rib cage. This is called flank pain.       There is new blood or pus in your urine.       You have any problems with your antibiotic medicine.   Watch closely for changes in your health, and be sure to contact your doctor if:      You are not getting better after taking an antibiotic for 2 days.       Your symptoms go away but then come back.   Where can you learn more?  Go to https://www.healthwise.net/patiented  Enter K848 in the search box to learn more about \"Urinary Tract Infection (UTI) in Women: Care Instructions.\"  Current as of: " February 28, 2023               Content Version: 13.8    9873-1334 [employer].   Care instructions adapted under license by your healthcare professional. If you have questions about a medical condition or this instruction, always ask your healthcare professional. [employer] disclaims any warranty or liability for your use of this information.

## 2024-03-15 ENCOUNTER — NURSE TRIAGE (OUTPATIENT)
Dept: FAMILY MEDICINE | Facility: CLINIC | Age: 69
End: 2024-03-15
Payer: COMMERCIAL

## 2024-03-15 ENCOUNTER — MEDICAL CORRESPONDENCE (OUTPATIENT)
Dept: HEALTH INFORMATION MANAGEMENT | Facility: CLINIC | Age: 69
End: 2024-03-15
Payer: COMMERCIAL

## 2024-03-15 NOTE — TELEPHONE ENCOUNTER
"Reason for Disposition   Chest pain lasting longer than 5 minutes and ANY of the following:         Pain is crushing, pressure-like, or heavy         Over 44 years old          Over 30 years old and one cardiac risk factor (e.g diabetes, high blood pressure, high cholesterol, smoker, or family history of heart disease)         History of heart disease (e.g. angina, heart attack, heart failure, bypass surgery, takes nitroglycerin)    Additional Information   Negative: SEVERE difficulty breathing (e.g., struggling for each breath, speaks in single words)   Negative: Visible sweat on face or sweat dripping down face   Negative: Sounds like a life-threatening emergency to the triager   Negative: Heart beating < 50 beats per minute OR > 140 beats per minute   Negative: Shock suspected (e.g., cold/pale/clammy skin, too weak to stand, low BP, rapid pulse)   Negative: Passed out (i.e., lost consciousness, collapsed and was not responding)   Negative: Difficult to awaken or acting confused (e.g., disoriented, slurred speech)   Negative: Followed an injury to chest    Answer Assessment - Initial Assessment Questions  1. LOCATION: \"Where does it hurt?\"        In my chest, into my neck, and back, and a little bit in my lower jaw area  2. RADIATION: \"Does the pain go anywhere else?\" (e.g., into neck, jaw, arms, back)      See above  3. ONSET: \"When did the chest pain begin?\" (Minutes, hours or days)       I had this happen about a week ago and then again last night  4. PATTERN: \"Does the pain come and go, or has it been constant since it started?\"  \"Does it get worse with exertion?\"      It was a 8/10 on pain level, and now it is a 3/10.  It has been constant since last night  5. DURATION: \"How long does it last\" (e.g., seconds, minutes, hours)      See above  6. SEVERITY: \"How bad is the pain?\"  (e.g., Scale 1-10; mild, moderate, or severe)     - MILD (1-3): doesn't interfere with normal activities      - MODERATE (4-7): " "interferes with normal activities or awakens from sleep     - SEVERE (8-10): excruciating pain, unable to do any normal activities        See above  7. CARDIAC RISK FACTORS: \"Do you have any history of heart problems or risk factors for heart disease?\" (e.g., angina, prior heart attack; diabetes, high blood pressure, high cholesterol, smoker, or strong family history of heart disease)      denies  8. PULMONARY RISK FACTORS: \"Do you have any history of lung disease?\"  (e.g., blood clots in lung, asthma, emphysema, birth control pills)      denies  9. CAUSE: \"What do you think is causing the chest pain?\"      GERD, but I am not sure  10. OTHER SYMPTOMS: \"Do you have any other symptoms?\" (e.g., dizziness, nausea, vomiting, sweating, fever, difficulty breathing, cough)       Slight nausea    CALL  NOW:   * Immediate medical attention is needed. You need to hang up and call 911 (or an ambulance).   Patient stated understanding and agreeable with the plan of care.     Georgiana CASILLAS RN  Triage Nurse  [facility]    Protocols used: Chest Pain-A-[place]    "

## 2024-03-27 ENCOUNTER — TRANSCRIBE ORDERS (OUTPATIENT)
Dept: OTHER | Age: 69
End: 2024-03-27

## 2024-03-27 ENCOUNTER — OFFICE VISIT (OUTPATIENT)
Dept: FAMILY MEDICINE | Facility: CLINIC | Age: 69
End: 2024-03-27
Payer: COMMERCIAL

## 2024-03-27 VITALS
HEART RATE: 62 BPM | RESPIRATION RATE: 16 BRPM | WEIGHT: 172 LBS | TEMPERATURE: 97.5 F | BODY MASS INDEX: 26.56 KG/M2 | DIASTOLIC BLOOD PRESSURE: 84 MMHG | SYSTOLIC BLOOD PRESSURE: 126 MMHG

## 2024-03-27 DIAGNOSIS — K20.90 ESOPHAGITIS: Primary | ICD-10-CM

## 2024-03-27 DIAGNOSIS — D32.9 MENINGIOMA (H): ICD-10-CM

## 2024-03-27 DIAGNOSIS — K21.00 GASTROESOPHAGEAL REFLUX DISEASE WITH ESOPHAGITIS WITHOUT HEMORRHAGE: Primary | ICD-10-CM

## 2024-03-27 PROCEDURE — G2211 COMPLEX E/M VISIT ADD ON: HCPCS | Performed by: PHYSICIAN ASSISTANT

## 2024-03-27 PROCEDURE — 99213 OFFICE O/P EST LOW 20 MIN: CPT | Performed by: PHYSICIAN ASSISTANT

## 2024-03-27 RX ORDER — OMEPRAZOLE 40 MG/1
40 CAPSULE, DELAYED RELEASE ORAL
COMMUNITY
Start: 2024-03-15 | End: 2024-03-29

## 2024-03-27 RX ORDER — OMEPRAZOLE 40 MG/1
40 CAPSULE, DELAYED RELEASE ORAL DAILY
Qty: 30 CAPSULE | Refills: 0 | Status: SHIPPED | OUTPATIENT
Start: 2024-03-27 | End: 2024-04-23

## 2024-03-27 RX ORDER — RESPIRATORY SYNCYTIAL VIRUS VACCINE 120MCG/0.5
0.5 KIT INTRAMUSCULAR ONCE
Qty: 1 EACH | Refills: 0 | Status: CANCELLED | OUTPATIENT
Start: 2024-03-27 | End: 2024-03-27

## 2024-03-27 NOTE — PROGRESS NOTES
"  Assessment & Plan     Gastroesophageal reflux disease with esophagitis without hemorrhage  Improved on high dose PPI. Reviewed ER visit.  Has plans to recheck EGD (patient states she confirmed with [facility] and they do have the order, they will call to schedule soon).  She does avoid NSAIDs.   Watching her diet.      Will use 40 mg omeprazole for 1 month then cut back to 20 mg.    May consider adding carafat if needed.  - omeprazole (PRILOSEC) 40 MG DR capsule; Take 1 capsule (40 mg) by mouth daily          (D32.9) Meningioma (H)  Comment: managed by neuro  Plan: f/up annually      The longitudinal plan of care for the diagnosis(es)/condition(s) as documented were addressed during this visit. Due to the added complexity in care, I will continue to support Neema in the subsequent management and with ongoing continuity of care.    MED REC REQUIRED  Post Medication Reconciliation Status: discharge medications reconciled, continue medications without change  BMI  Estimated body mass index is 26.56 kg/m  as calculated from the following:    Height as of 7/26/23: 1.714 m (5' 7.48\").    Weight as of this encounter: 78 kg (172 lb).             Yasmin Bethea is a 68 year old, presenting for the following health issues:  No chief complaint on file.        3/27/2024     1:49 PM   Additional Questions   Roomed by Gaby   Accompanied by Self         3/27/2024     1:49 PM   Patient Reported Additional Medications   Patient reports taking the following new medications na     HPI     Patient arrived for ER follow-up.     ED/UC Followup:    Facility:  [facility]   Date of visit: 03/15/24  Reason for visit: Pt with a history of peptic ulcer disease and chronic reflux seen for worsening abdominal/chest pain. Prescribed omeprazole (PRILOSEC) 40 mg Delayed-Release capsule.   Current Status: Patients symptoms have improved with help of Omeprazole, no symptoms of nausea or vomiting. Patient has cut out or " limited foods that seem to flare up her GERD; Garlic, Ice Cream, Cocktails, Tomatoes and Chocolate, still drinks a half coffee in the morning but has cut down on this as well.             Review of Systems  Constitutional, HEENT, cardiovascular, pulmonary, gi and gu systems are negative, except as otherwise noted.      Objective    LMP  (LMP Unknown)   There is no height or weight on file to calculate BMI.  Physical Exam   GENERAL: alert and no distress  RESP: lungs clear to auscultation - no rales, rhonchi or wheezes  CV: regular rate and rhythm, normal S1 S2, no S3 or S4, no murmur, click or rub, no peripheral edema   ABDOMEN: soft, nontender, no hepatosplenomegaly, no masses and bowel sounds normal            Signed Electronically by: Cherry Wood PA-C

## 2024-04-05 ENCOUNTER — TELEPHONE (OUTPATIENT)
Dept: GASTROENTEROLOGY | Facility: CLINIC | Age: 69
End: 2024-04-05
Payer: COMMERCIAL

## 2024-04-05 NOTE — TELEPHONE ENCOUNTER
"Endoscopy Scheduling Screen    Have you had a positive Covid test in the last 14 days?  No    What is your communication preference for Instructions and/or Bowel Prep?   MyChart    What insurance is in the chart?  Other:  [facility]    Ordering/Referring Provider: MAYITO CLARK   (If ordering provider performs procedure, schedule with ordering provider unless otherwise instructed. )    BMI: Estimated body mass index is 26.56 kg/m  as calculated from the following:    Height as of 7/26/23: 1.714 m (5' 7.48\").    Weight as of 3/27/24: 78 kg (172 lb).     Sedation Ordered  moderate sedation.   If patient BMI > 50 do not schedule in [facility].    If patient BMI > 45 do not schedule at [facility].    Are you taking methadone or Suboxone?  No    Have you had difficulties, pain, or discomfort during past endoscopy procedures?  No    Are you taking any prescription medications for pain 3 or more times per week?   NO, No RN review required.    Do you have a history of malignant hyperthermia?  No    (Females) Are you currently pregnant?   No     Have you been diagnosed or told you have pulmonary hypertension?   No    Do you have an LVAD?  No    Have you been told you have moderate to severe sleep apnea?  No    Have you been told you have COPD, asthma, or any other lung disease?  No    Do you have any heart conditions?  No     Have you ever had or are you waiting for an organ transplant?  No. Continue scheduling, no site restrictions.    Have you had a stroke or transient ischemic attack (TIA aka \"mini stroke\" in the last 6 months?   No    Have you been diagnosed with or been told you have cirrhosis of the liver?   No    Are you currently on dialysis?   No    Do you need assistance transferring?   No    BMI: Estimated body mass index is 26.56 kg/m  as calculated from the following:    Height as of 7/26/23: 1.714 m (5' 7.48\").    Weight as of 3/27/24: 78 kg (172 lb).     Is patients BMI > 40 and scheduling location [facility]?  No    Do you take an " injectable medication for weight loss or diabetes (excluding insulin)?  No    Do you take the medication Naltrexone?  No    Do you take blood thinners?  No       Prep   Are you currently on dialysis or do you have chronic kidney disease?  No    Do you have a diagnosis of diabetes?  No    Do you have a diagnosis of cystic fibrosis (CF)?  No    On a regular basis do you go 3 -5 days between bowel movements?  No    BMI > 40?  No    Preferred Pharmacy:    [id number] - [place]  [facility] & [address] [place] [address]  Phone: [phone] Fax: [phone]      Final Scheduling Details     Procedure scheduled  Upper endoscopy (EGD)    Surgeon:  ERNESTO     Date of procedure:  5/30/24     Pre-OP / PAC:   No - Not required for this site.    Location  [facility] - Patient preference.    Sedation   Moderate Sedation - Per order.      Patient Reminders:   You will receive a call from a Nurse to review instructions and health history.  This assessment must be completed prior to your procedure.  Failure to complete the Nurse assessment may result in the procedure being cancelled.      On the day of your procedure, please designate an adult(s) who can drive you home stay with you for the next 24 hours. The medicines used in the exam will make you sleepy. You will not be able to drive.      You cannot take public transportation, ride share services, or non-medical taxi service without a responsible caregiver.  Medical transport services are allowed with the requirement that a responsible caregiver will receive you at your destination.  We require that drivers and caregivers are confirmed prior to your procedure.

## 2024-04-15 ENCOUNTER — OFFICE VISIT (OUTPATIENT)
Dept: OPHTHALMOLOGY | Facility: CLINIC | Age: 69
End: 2024-04-15
Payer: COMMERCIAL

## 2024-04-15 DIAGNOSIS — H35.371 EPIRETINAL MEMBRANE, RIGHT EYE: ICD-10-CM

## 2024-04-15 DIAGNOSIS — H02.831 DERMATOCHALASIS OF BOTH UPPER EYELIDS: ICD-10-CM

## 2024-04-15 DIAGNOSIS — H46.9 OPTIC NEUROPATHY: Primary | ICD-10-CM

## 2024-04-15 DIAGNOSIS — H02.834 DERMATOCHALASIS OF BOTH UPPER EYELIDS: ICD-10-CM

## 2024-04-15 DIAGNOSIS — H53.40 VISUAL FIELD DEFECT: ICD-10-CM

## 2024-04-15 DIAGNOSIS — H52.13 MYOPIA OF BOTH EYES: ICD-10-CM

## 2024-04-15 PROCEDURE — 92133 CPTRZD OPH DX IMG PST SGM ON: CPT

## 2024-04-15 PROCEDURE — 92083 EXTENDED VISUAL FIELD XM: CPT

## 2024-04-15 PROCEDURE — 99203 OFFICE O/P NEW LOW 30 MIN: CPT

## 2024-04-15 PROCEDURE — G0463 HOSPITAL OUTPT CLINIC VISIT: HCPCS

## 2024-04-15 PROCEDURE — 92015 DETERMINE REFRACTIVE STATE: CPT

## 2024-04-15 ASSESSMENT — REFRACTION_WEARINGRX
OD_ADD: +2.50
OD_SPHERE: -11.00
OS_SPHERE: -12.00
OS_ADD: +2.50
SPECS_TYPE: PAL
OD_CYLINDER: +0.50
OD_AXIS: 165
OS_CYLINDER: +1.25
OS_AXIS: 040

## 2024-04-15 ASSESSMENT — CONF VISUAL FIELD
OS_SUPERIOR_NASAL_RESTRICTION: 0
OD_SUPERIOR_TEMPORAL_RESTRICTION: 0
OS_NORMAL: 1
OS_INFERIOR_NASAL_RESTRICTION: 0
OS_INFERIOR_TEMPORAL_RESTRICTION: 0
OD_INFERIOR_NASAL_RESTRICTION: 0
OD_SUPERIOR_NASAL_RESTRICTION: 0
OD_INFERIOR_TEMPORAL_RESTRICTION: 0
METHOD: COUNTING FINGERS
OS_SUPERIOR_TEMPORAL_RESTRICTION: 0
OD_NORMAL: 1

## 2024-04-15 ASSESSMENT — VISUAL ACUITY
OS_CC: 20/25
OD_CC: 20/40
CORRECTION_TYPE: GLASSES
OD_CC+: +2
METHOD: SNELLEN - LINEAR
OS_CC+: -3

## 2024-04-15 ASSESSMENT — REFRACTION_MANIFEST
OD_ADD: +2.75
OD_SPHERE: -10.75
OS_ADD: +2.75
OS_SPHERE: -11.75
OS_AXIS: 030
OD_CYLINDER: +1.75
OD_AXIS: 175
OS_CYLINDER: +1.25

## 2024-04-15 ASSESSMENT — EXTERNAL EXAM - LEFT EYE: OS_EXAM: NORMAL

## 2024-04-15 ASSESSMENT — EXTERNAL EXAM - RIGHT EYE: OD_EXAM: NORMAL

## 2024-04-15 ASSESSMENT — TONOMETRY
OD_IOP_MMHG: 12
IOP_METHOD: ICARE
OS_IOP_MMHG: 13

## 2024-04-15 ASSESSMENT — SLIT LAMP EXAM - LIDS
COMMENTS: MGD, DERMATOCHALASIS
COMMENTS: MGD, DERMATOCHALASIS

## 2024-04-16 NOTE — TELEPHONE ENCOUNTER
FUTURE VISIT INFORMATION      FUTURE VISIT INFORMATION:  Date: 7/9/24  Time: 8:00am  Location: [facility]  REFERRAL INFORMATION:  Referring provider:  Geri Jerez OD   Referring providers clinic:  [facility]  Reason for visit/diagnosis  dermatochalasis     RECORDS REQUESTED FROM:       Clinic name Comments Records Status Imaging Status   [facility] OV/referral 4/15/24  Ov/notes 4/15/24-4/25/12 [facility]    Imaging MR Brain done 10/10/22  MR Brain 10/8/21 [facility]

## 2024-04-23 DIAGNOSIS — K21.00 GASTROESOPHAGEAL REFLUX DISEASE WITH ESOPHAGITIS WITHOUT HEMORRHAGE: ICD-10-CM

## 2024-04-23 RX ORDER — OMEPRAZOLE 40 MG/1
40 CAPSULE, DELAYED RELEASE ORAL DAILY
Qty: 90 CAPSULE | Refills: 0 | Status: SHIPPED | OUTPATIENT
Start: 2024-04-23 | End: 2024-04-24

## 2024-04-24 ENCOUNTER — OFFICE VISIT (OUTPATIENT)
Dept: FAMILY MEDICINE | Facility: CLINIC | Age: 69
End: 2024-04-24
Payer: COMMERCIAL

## 2024-04-24 ENCOUNTER — TELEPHONE (OUTPATIENT)
Dept: FAMILY MEDICINE | Facility: CLINIC | Age: 69
End: 2024-04-24

## 2024-04-24 VITALS
RESPIRATION RATE: 20 BRPM | TEMPERATURE: 97.8 F | HEIGHT: 68 IN | BODY MASS INDEX: 26.22 KG/M2 | DIASTOLIC BLOOD PRESSURE: 85 MMHG | SYSTOLIC BLOOD PRESSURE: 130 MMHG | OXYGEN SATURATION: 96 % | WEIGHT: 173 LBS | HEART RATE: 101 BPM

## 2024-04-24 DIAGNOSIS — H69.91 DYSFUNCTION OF RIGHT EUSTACHIAN TUBE: ICD-10-CM

## 2024-04-24 DIAGNOSIS — K21.00 GASTROESOPHAGEAL REFLUX DISEASE WITH ESOPHAGITIS WITHOUT HEMORRHAGE: Primary | ICD-10-CM

## 2024-04-24 DIAGNOSIS — K21.00 GASTROESOPHAGEAL REFLUX DISEASE WITH ESOPHAGITIS WITHOUT HEMORRHAGE: ICD-10-CM

## 2024-04-24 PROCEDURE — 99213 OFFICE O/P EST LOW 20 MIN: CPT | Performed by: PHYSICIAN ASSISTANT

## 2024-04-24 RX ORDER — OMEPRAZOLE 40 MG/1
40 CAPSULE, DELAYED RELEASE ORAL DAILY
Qty: 90 CAPSULE | OUTPATIENT
Start: 2024-04-24

## 2024-04-24 RX ORDER — SUCRALFATE 1 G/1
TABLET ORAL
Qty: 180 TABLET | OUTPATIENT
Start: 2024-04-24

## 2024-04-24 RX ORDER — SUCRALFATE 1 G/1
1 TABLET ORAL 2 TIMES DAILY PRN
Qty: 60 TABLET | Refills: 0 | Status: SHIPPED | OUTPATIENT
Start: 2024-04-24 | End: 2024-05-30

## 2024-04-24 RX ORDER — OMEPRAZOLE 40 MG/1
40 CAPSULE, DELAYED RELEASE ORAL DAILY
Qty: 30 CAPSULE | Refills: 0 | Status: SHIPPED | OUTPATIENT
Start: 2024-04-24 | End: 2024-05-30

## 2024-04-24 NOTE — PATIENT INSTRUCTIONS
Let's leave the omeprazole at the higher dose 40 mg over your trip.  You can try cutting back on this when you return.    Ok to use carafat PRN (twice per day) for GI symptoms.     For the ears, they are clean today.  Do 1 more debrox treatment before your trip to last while you are there.      Continue with the claritin daily.   Start flonase (fluticasone) nasal spray daily now (bring along on your trip if needed).

## 2024-04-24 NOTE — PROGRESS NOTES
"  Assessment & Plan     Gastroesophageal reflux disease with esophagitis without hemorrhage    Let's leave the omeprazole at the higher dose 40 mg over your trip.  You can try cutting back on this when you return.    Ok to use carafat PRN (twice per day) for GI symptoms.   - omeprazole (PRILOSEC) 40 MG DR capsule; Take 1 capsule (40 mg) by mouth daily  - sucralfate (CARAFATE) 1 GM tablet; Take 1 tablet (1 g) by mouth 2 times daily as needed for nausea ((GERD))    Dysfunction of right eustachian tube    For the ears, they are clean today.  Do 1 more debrox treatment before your trip to last while you are there.      Continue with the claritin daily.   Start flonase (fluticasone) nasal spray daily now (bring along on your trip if needed).     I do not recommend antibiotics for her upcoming trip given the location she is going.       The longitudinal plan of care for the diagnosis(es)/condition(s) as documented were addressed during this visit. Due to the added complexity in care, I will continue to support Neema in the subsequent management and with ongoing continuity of care.        BMI  Estimated body mass index is 26.7 kg/m  as calculated from the following:    Height as of this encounter: 1.715 m (5' 7.5\").    Weight as of this encounter: 78.5 kg (173 lb).             Yasmin Bethea is a 68 year old, presenting for the following health issues:  Gastrointestinal Problem        4/24/2024     7:47 AM   Additional Questions   Roomed by MP         4/24/2024     7:47 AM   Patient Reported Additional Medications   Patient reports taking the following new medications None per patient     History of Present Illness       Reason for visit:  Follow up on GERD meds and also Ear issue - which is a long term issue but need to check it as am flying [place] on [place]    She eats 4 or more servings of fruits and vegetables daily.She consumes 0 sweetened beverage(s) daily.She exercises with enough effort to increase her heart " "rate 30 to 60 minutes per day.  She exercises with enough effort to increase her heart rate 6 days per week.   She is taking medications regularly.         GERD symptoms have improved, currently on 40 mg omeprazole.      Does get seasonal allergies.  Started claritin daily for the past 1 week.   Has been feeling right ear popping off and on over the past week.  Has used flonase in the past PRN.       Traveling to [place] next week (14 day trip).  She will be staying in cities/tourist locations.  She wonders if she needs antibiotics.             Review of Systems  Constitutional, neuro, ENT, endocrine, pulmonary, cardiac, gastrointestinal, genitourinary, musculoskeletal, integument and psychiatric systems are negative, except as otherwise noted.      Objective    /85   Pulse 101   Temp 97.8  F (36.6  C) (Temporal)   Resp 20   Ht 1.715 m (5' 7.5\")   Wt 78.5 kg (173 lb)   LMP  (LMP Unknown)   SpO2 96%   BMI 26.70 kg/m    Body mass index is 26.7 kg/m .  Physical Exam   GENERAL: alert and no distress  HENT: ear canals and TM's normal, nose and mouth without ulcers or lesions  NECK: no adenopathy, no asymmetry, masses, or scars  ABDOMEN: soft, nontender, no hepatosplenomegaly, no masses and bowel sounds normal  MS: no gross musculoskeletal defects noted, no edema            Signed Electronically by: Cherry Wood PA-C    "

## 2024-04-24 NOTE — TELEPHONE ENCOUNTER
Medication Question or Refill    Contacts         Type Contact Phone/Fax    04/24/2024 10:33 AM CDT Phone (Incoming) Neema Nixon (Self) [phone] (M)            What medication are you calling about (include dose and sig)?: omeprazole (PRILOSEC) 40 MG DR capsule, sucralfate (CARAFATE) 1 GM tablet    Preferred Pharmacy:   [facility] #[id number] - [address]  [address] [place] [address]  Phone: [phone] Fax: [phone]      Controlled Substance Agreement on file:   CSA -- Patient Level:    CSA: None found at the patient level.       Who prescribed the medication?: sindt    Do you need a refill? Yes    When did you use the medication last? 04/24/24    Patient offered an appointment? No    Do you have any questions or concerns?  Yes: why it was it  denied       Could we send this information to you in [facility] or would you prefer to receive a phone call?:   Patient would prefer a phone call   Okay to leave a detailed message?: Yes at Cell number on file:    Telephone Information:   Mobile [phone]

## 2024-04-24 NOTE — TELEPHONE ENCOUNTER
RN left message to check with the pharmacy for the refills that were sent today and to return call to clinic [phone] with further questions.  (RN did not leave specific details on voicemail for confidential reasons)    Aga Cristobal RN on 4/24/2024 at 10:52 AM

## 2024-05-07 ENCOUNTER — PATIENT OUTREACH (OUTPATIENT)
Dept: CARE COORDINATION | Facility: CLINIC | Age: 69
End: 2024-05-07
Payer: COMMERCIAL

## 2024-05-20 ENCOUNTER — TELEPHONE (OUTPATIENT)
Dept: GASTROENTEROLOGY | Facility: CLINIC | Age: 69
End: 2024-05-20

## 2024-05-20 NOTE — TELEPHONE ENCOUNTER
Pre visit planning completed.      Procedure details:    Patient scheduled for Upper endoscopy (EGD) on 5/30/24.     Arrival time: 1345. Procedure time 1430    Facility location: [facility]; [address], [place] [address]. Check in location: 2nd Floor at Surgery desk.    Sedation type: Conscious sedation     Pre op exam needed? N/A    Indication for procedure:     Esophagitis         Chart review:     Electronic implanted devices? No    Recent diagnosis of diverticulitis within the last 6 weeks? No    Diabetic? No      Medication review:    Anticoagulants? No    NSAIDS? No NSAID medications per patient's medication list.  RN will verify with pre-assessment call.    Other medication HOLDING recommendations:  EGD: Sucralfate (Carafate): HOLD 1 day before procedure.      Prep for procedure:         Prep instructions sent via [employer]         Sugey Ocasio RN  Endoscopy Procedure Pre Assessment RN  [phone] option 4

## 2024-05-22 NOTE — TELEPHONE ENCOUNTER
Attempted to contact patient in order to complete pre assessment questions.     No answer. Left message to return call to [phone] option 4    Callback required communication sent via [employer].      Cristina Li RN  Endoscopy Procedure Pre Assessment RN

## 2024-05-22 NOTE — TELEPHONE ENCOUNTER
Pre assessment completed for upcoming procedure.   (Please see previous telephone encounter notes for complete details)    Patient  returned call.       Procedure details:    Arrival time and facility location reviewed.    Pre op exam needed? N/A    Designated  policy reviewed. Instructed to have someone stay 6  hours post procedure.       Medication review:    Medications reviewed. Please see supporting documentation below. Holding recommendations discussed (if applicable).   Sucralfate (Carafate): HOLD 1 day before procedure.      Prep for procedure:     Procedure prep instructions reviewed.        Any additional information needed:  N/A      Patient  verbalized understanding and had no questions or concerns at this time.      Cristina Baeza RN  Endoscopy Procedure Pre Assessment RN  [phone] option 4

## 2024-05-30 ENCOUNTER — HOSPITAL ENCOUNTER (OUTPATIENT)
Facility: AMBULATORY SURGERY CENTER | Age: 69
Discharge: HOME | End: 2024-05-30
Attending: INTERNAL MEDICINE | Admitting: INTERNAL MEDICINE
Payer: COMMERCIAL

## 2024-05-30 VITALS
DIASTOLIC BLOOD PRESSURE: 58 MMHG | RESPIRATION RATE: 16 BRPM | TEMPERATURE: 97.5 F | SYSTOLIC BLOOD PRESSURE: 107 MMHG | HEART RATE: 56 BPM | OXYGEN SATURATION: 96 %

## 2024-05-30 DIAGNOSIS — K21.00 GASTROESOPHAGEAL REFLUX DISEASE WITH ESOPHAGITIS WITHOUT HEMORRHAGE: ICD-10-CM

## 2024-05-30 LAB — UPPER GI ENDOSCOPY: NORMAL

## 2024-05-30 PROCEDURE — G8918 PT W/O PREOP ORDER IV AB PRO: HCPCS

## 2024-05-30 PROCEDURE — 88305 TISSUE EXAM BY PATHOLOGIST: CPT | Performed by: PATHOLOGY

## 2024-05-30 PROCEDURE — 43239 EGD BIOPSY SINGLE/MULTIPLE: CPT

## 2024-05-30 PROCEDURE — G8907 PT DOC NO EVENTS ON DISCHARG: HCPCS

## 2024-05-30 RX ORDER — LIDOCAINE 40 MG/G
CREAM TOPICAL
Status: DISCONTINUED | OUTPATIENT
Start: 2024-05-30 | End: 2024-05-31 | Stop reason: HOSPADM

## 2024-05-30 RX ORDER — ONDANSETRON 2 MG/ML
4 INJECTION INTRAMUSCULAR; INTRAVENOUS EVERY 6 HOURS PRN
Status: DISCONTINUED | OUTPATIENT
Start: 2024-05-30 | End: 2024-05-31 | Stop reason: HOSPADM

## 2024-05-30 RX ORDER — FENTANYL CITRATE 50 UG/ML
INJECTION, SOLUTION INTRAMUSCULAR; INTRAVENOUS PRN
Status: DISCONTINUED | OUTPATIENT
Start: 2024-05-30 | End: 2024-05-30 | Stop reason: HOSPADM

## 2024-05-30 RX ORDER — SUCRALFATE 1 G/1
TABLET ORAL
Qty: 60 TABLET | Refills: 0 | Status: SHIPPED | OUTPATIENT
Start: 2024-05-30

## 2024-05-30 RX ORDER — NALOXONE HYDROCHLORIDE 0.4 MG/ML
0.2 INJECTION, SOLUTION INTRAMUSCULAR; INTRAVENOUS; SUBCUTANEOUS
Status: DISCONTINUED | OUTPATIENT
Start: 2024-05-30 | End: 2024-05-31 | Stop reason: HOSPADM

## 2024-05-30 RX ORDER — FLUMAZENIL 0.1 MG/ML
0.2 INJECTION, SOLUTION INTRAVENOUS
Status: ACTIVE | OUTPATIENT
Start: 2024-05-30 | End: 2024-05-30

## 2024-05-30 RX ORDER — OMEPRAZOLE 40 MG/1
40 CAPSULE, DELAYED RELEASE ORAL DAILY
Qty: 30 CAPSULE | Refills: 2 | Status: SHIPPED | OUTPATIENT
Start: 2024-05-30 | End: 2024-08-05

## 2024-05-30 RX ORDER — PROCHLORPERAZINE MALEATE 5 MG
5 TABLET ORAL EVERY 6 HOURS PRN
Status: DISCONTINUED | OUTPATIENT
Start: 2024-05-30 | End: 2024-05-31 | Stop reason: HOSPADM

## 2024-05-30 RX ORDER — NALOXONE HYDROCHLORIDE 0.4 MG/ML
0.4 INJECTION, SOLUTION INTRAMUSCULAR; INTRAVENOUS; SUBCUTANEOUS
Status: DISCONTINUED | OUTPATIENT
Start: 2024-05-30 | End: 2024-05-31 | Stop reason: HOSPADM

## 2024-05-30 RX ORDER — ONDANSETRON 2 MG/ML
4 INJECTION INTRAMUSCULAR; INTRAVENOUS
Status: COMPLETED | OUTPATIENT
Start: 2024-05-30 | End: 2024-05-30

## 2024-05-30 RX ORDER — ONDANSETRON 4 MG/1
4 TABLET, ORALLY DISINTEGRATING ORAL EVERY 6 HOURS PRN
Status: DISCONTINUED | OUTPATIENT
Start: 2024-05-30 | End: 2024-05-31 | Stop reason: HOSPADM

## 2024-05-30 RX ADMIN — ONDANSETRON 4 MG: 2 INJECTION INTRAMUSCULAR; INTRAVENOUS at 11:12

## 2024-05-30 NOTE — H&P
[facility] Anesthesia Pre-op History and Physical    Neema Nixon MRN# [id number]   Age: 68 year old YOB: 1955      Date of Exam 5/30/2024       Primary care provider: Cherry Wood         Chief Complaint and/or Reason for Procedure:     GERD with esophagitis         Active problem list:     Patient Active Problem List    Diagnosis Date Noted    Osteopenia, unspecified location 04/28/2021     Priority: Medium    Meningioma (H) 07/24/2020     Priority: Medium     Added automatically from request for surgery [id number]      Vision loss of right eye 07/24/2020     Priority: Medium     Added automatically from request for surgery [id number]      GERD (gastroesophageal reflux disease) 06/01/2010     Priority: Medium            Medications (include herbals and vitamins):   Any Plavix use in the last 7 days? No     Current Outpatient Medications   Medication Sig Dispense Refill    cyanocobalamin (VITAMIN B-12) 1000 MCG tablet Take by mouth every morning       loratadine (CLARITIN) 10 MG tablet Take 10 mg by mouth as needed for allergies      omeprazole (PRILOSEC) 40 MG DR capsule Take 1 capsule (40 mg) by mouth daily 30 capsule 0    sucralfate (CARAFATE) 1 GM tablet Take 1 tablet (1 g) by mouth 2 times daily as needed for nausea ((GERD)) 60 tablet 0    Vitamin D, Cholecalciferol, 400 units TABS Take 1 tablet by mouth every morning       artificial tears OINT ophthalmic ointment Place into both eyes At Bedtime       sodium chloride (OCEAN) 0.65 % nasal spray Spray 1 spray into both nostrils daily as needed for congestion OTC       Current Facility-Administered Medications   Medication Dose Route Frequency Provider Last Rate Last Admin    flumazenil (ROMAZICON) injection 0.2 mg  0.2 mg Intravenous q1 min prn Sheri Summers,         lidocaine (LMX4) kit   Topical Q1H PRN Sheri Summers,         lidocaine 1 % 0.1-1 mL  0.1-1 mL Other Q1H PRN Sheri Summers,         naloxone (NARCAN)  injection 0.2 mg  0.2 mg Intravenous Q2 Min PRN McBeath, Sheri, DO        naloxone (NARCAN) injection 0.2 mg  0.2 mg Intramuscular Q2 Min PRN McBeath, Sheri, DO        naloxone (NARCAN) injection 0.4 mg  0.4 mg Intravenous Q2 Min PRN McBeath, Sheri, DO        naloxone (NARCAN) injection 0.4 mg  0.4 mg Intramuscular Q2 Min PRN McBeath, Sheri, DO        ondansetron (ZOFRAN ODT) ODT tab 4 mg  4 mg Oral Q6H PRN McBeath, Sheri, DO        Or    ondansetron (ZOFRAN) injection 4 mg  4 mg Intravenous Q6H PRN McBeath, Sheri, DO        ondansetron (ZOFRAN) injection 4 mg  4 mg Intravenous Once PRN McBeath, Sheri, DO        prochlorperazine (COMPAZINE) injection 5 mg  5 mg Intravenous Q6H PRN McBeath, Sheri, DO        Or    prochlorperazine (COMPAZINE) tablet 5 mg  5 mg Oral Q6H PRN McBeath, Sheri, DO        sodium chloride (PF) 0.9% PF flush 3 mL  3 mL Intracatheter Q8H McBeath, Sheri, DO        sodium chloride (PF) 0.9% PF flush 3 mL  3 mL Intracatheter q1 min prn McBeath, Sheri, DO                 Allergies:      Allergies   Allergen Reactions    Sulfa Antibiotics Rash     Allergy to Latex? No  Allergy to tape?   No  Intolerances:             Physical Exam:   All vitals have been reviewed  Patient Vitals for the past 8 hrs:   BP Temp Temp src Resp SpO2   05/30/24 1105 117/68 97.5  F (36.4  C) Temporal 16 99 %     No intake/output data recorded.  Lungs:   no increased work of breathing     Cardiovascular:   RRR             Lab / Radiology Results:         Anesthetic risk and/or ASA classification:   2    Sheri Summers DO

## 2024-06-03 LAB
PATH REPORT.COMMENTS IMP SPEC: NORMAL
PATH REPORT.COMMENTS IMP SPEC: NORMAL
PATH REPORT.FINAL DX SPEC: NORMAL
PATH REPORT.GROSS SPEC: NORMAL
PATH REPORT.MICROSCOPIC SPEC OTHER STN: NORMAL
PATH REPORT.RELEVANT HX SPEC: NORMAL
PHOTO IMAGE: NORMAL

## 2024-06-10 ENCOUNTER — ANCILLARY PROCEDURE (OUTPATIENT)
Dept: MAMMOGRAPHY | Facility: CLINIC | Age: 69
End: 2024-06-10
Attending: PHYSICIAN ASSISTANT
Payer: COMMERCIAL

## 2024-06-10 DIAGNOSIS — Z12.31 ENCOUNTER FOR SCREENING MAMMOGRAM FOR BREAST CANCER: ICD-10-CM

## 2024-06-10 PROCEDURE — 77067 SCR MAMMO BI INCL CAD: CPT | Mod: TC | Performed by: STUDENT IN AN ORGANIZED HEALTH CARE EDUCATION/TRAINING PROGRAM

## 2024-06-10 PROCEDURE — 77063 BREAST TOMOSYNTHESIS BI: CPT | Mod: TC | Performed by: STUDENT IN AN ORGANIZED HEALTH CARE EDUCATION/TRAINING PROGRAM

## 2024-07-02 ENCOUNTER — TELEPHONE (OUTPATIENT)
Dept: OPHTHALMOLOGY | Facility: CLINIC | Age: 69
End: 2024-07-02
Payer: COMMERCIAL

## 2024-07-09 ENCOUNTER — PRE VISIT (OUTPATIENT)
Dept: OPHTHALMOLOGY | Facility: CLINIC | Age: 69
End: 2024-07-09

## 2024-07-09 ENCOUNTER — OFFICE VISIT (OUTPATIENT)
Dept: OPHTHALMOLOGY | Facility: CLINIC | Age: 69
End: 2024-07-09
Payer: COMMERCIAL

## 2024-07-09 DIAGNOSIS — H46.9 OPTIC NEUROPATHY: Primary | ICD-10-CM

## 2024-07-09 DIAGNOSIS — D32.9 MENINGIOMA (H): ICD-10-CM

## 2024-07-09 DIAGNOSIS — H02.403 INVOLUTIONAL PTOSIS, ACQUIRED, BILATERAL: ICD-10-CM

## 2024-07-09 PROCEDURE — 99214 OFFICE O/P EST MOD 30 MIN: CPT | Performed by: OPHTHALMOLOGY

## 2024-07-09 PROCEDURE — 92082 INTERMEDIATE VISUAL FIELD XM: CPT | Performed by: OPHTHALMOLOGY

## 2024-07-09 PROCEDURE — 92285 EXTERNAL OCULAR PHOTOGRAPHY: CPT | Performed by: OPHTHALMOLOGY

## 2024-07-09 ASSESSMENT — TONOMETRY
IOP_METHOD: ICARE
OS_IOP_MMHG: 12
OD_IOP_MMHG: 10

## 2024-07-09 ASSESSMENT — CONF VISUAL FIELD
OS_NORMAL: 1
OD_SUPERIOR_NASAL_RESTRICTION: 0
METHOD: COUNTING FINGERS
OS_SUPERIOR_TEMPORAL_RESTRICTION: 0
OS_INFERIOR_TEMPORAL_RESTRICTION: 0
OS_SUPERIOR_NASAL_RESTRICTION: 0
OD_INFERIOR_TEMPORAL_RESTRICTION: 0
OD_INFERIOR_NASAL_RESTRICTION: 0
OD_NORMAL: 1
OS_INFERIOR_NASAL_RESTRICTION: 0
OD_SUPERIOR_TEMPORAL_RESTRICTION: 0

## 2024-07-09 ASSESSMENT — VISUAL ACUITY
OD_CC: 20/40
OD_PH_CC: 20/30
OD_PH_CC+: -1
METHOD: SNELLEN - LINEAR
OS_CC: 20/30

## 2024-07-09 ASSESSMENT — REFRACTION_WEARINGRX
OD_CYLINDER: +1.75
OD_ADD: +2.75
OD_AXIS: 175
OS_CYLINDER: +1.25
OS_AXIS: 030
OS_SPHERE: -11.75
OS_ADD: +2.75
OD_SPHERE: -10.75

## 2024-07-09 ASSESSMENT — SLIT LAMP EXAM - LIDS
COMMENTS: PTOSIS
COMMENTS: PTOSIS

## 2024-07-09 ASSESSMENT — EXTERNAL EXAM - RIGHT EYE: OD_EXAM: NORMAL

## 2024-07-09 NOTE — NURSING NOTE
Chief Complaints and History of Present Illnesses   Patient presents with    Droopy Both Upper Lids     Chief Complaint(s) and History of Present Illness(es)       Droopy Both Upper Lids              Severity: moderate    Onset: chronic    Duration: 2 years    Timing: in the morning, in the evening and when tired    Associated signs and symptoms: lid swelling              Comments    Patient notes droopy eyelids left eye greater than right.  At the end of the day the lids will overhang her lashes and will manually lift the lids in order to see better.  Patient notes that this is hereditary.  Denies pain.      Sarah Beth Luna on 7/9/2024 at 7:51 AM

## 2024-07-09 NOTE — PROGRESS NOTES
Oculoplastic Clinic New Patient    Patient: Neema Nixon MRN# [id number]   YOB: 1955 Age: 69 year old   Date of Visit: Jul 9, 2024    CC: Droopy eyelids obstructing vision.              HPI:     Chief Complaint(s) and History of Present Illness(es)     Droopy Both Upper Lids            Severity: moderate    Onset: chronic    Duration: 2 years    Timing: in the morning, in the evening and when tired    Associated signs and symptoms: lid swelling          Comments    Patient notes droopy eyelids left eye greater than right.  At the end of   the day the lids will overhang her lashes and will manually lift the lids   in order to see better.  Patient notes that this is hereditary.  Denies   pain.  History of right optic nerve sheath meningioma.      Sarah Beth Luna on 7/9/2024 at 7:51 AM                 Neema Nixon is a 69 year old female who has noted gradual onset of droopy eyelids over the past years. The droopy eyelid is interfering with activities of daily living including driving, and reading. The patient denies double vision, variability of the eyelid position, or dry eye symptoms.     History of meningioma resection with Dr. Garcia 8/20/202 involving right clinoid region  History of strabismus surgery with Dr. Hernández 2/2020 ([facility], [facility])    EXAM:     MRD1: 1 mm right eye and 0 mm left eye   Aponeurotic ptosis both eyes  Brow ptosis left compared to right.      VISUAL FIELD:  Right eye untaped:15 degrees Right eye taped:50 degrees  Left eye untaped:10 degrees Left eye taped:50 degrees    Assessment & Plan     Neema Nixon is a 69 year old female with the following diagnoses:   1. Optic neuropathy    2. Involutional ptosis, acquired, bilateral    3. Meningioma (H)       Meningioma s/p resection with Dr. Garcia 2020 as noted above    We discussed options. She is not bothered by her left brow ptosis or dermatochalasis. She is mainly symptomatic of the  belpharoptosis.  Plan:  Bilateral posterior approach ptosis repair 8 mm right eye and 9 mm left eye. [id number]    We did discuss her dermatochalasis may worsen.        The heavy upper eyelids are causing symptoms as documented above. The condition is not secondary to underlying Sjogren's, myasthenia gravis, or polymyositis.   ANTICOAGULATION:           PHOTOS DEMONSTRATE:    Blepharoptosis  Brow ptosis with thicker brow skin and hairs below the lateral superior orbital rim on the left    Attending Physician Attestation: Complete documentation of historical and exam elements from today's encounter can be found in the full encounter summary report (not reduplicated in this progress note). I personally obtained the chief complaint(s) and history of present illness. I confirmed and edited as necessary the review of systems, past medical/surgical history, family history, social history, and examination findings as documented by others; and I examined the patient myself. I personally reviewed the relevant tests, images, and reports as documented above. I formulated and edited as necessary the assessment and plan and discussed the findings and management plan with the patient. Monika Bucio MD      Today with Neema Nixon I reviewed the indications, risks, benefits, and alternatives of the proposed surgical procedure including, but not limited to, failure obtain the desired result  and need for additional surgery, bleeding, infection, loss of vision, or injury to the eye, dry eyes, and the remote possibility of permanent damage to any organ system or death with the use of anesthesia.  I provided multiple opportunities for the questions, answered all questions to the best of my ability, and confirmed that my answers and my discussion were understood.

## 2024-07-09 NOTE — LETTER
2024         RE:  :  MRN: Neema Nixon  1955  [id number]     Dear Dr. Geri Jerez,    Thank you for asking me to see your patient, Neema Nixon, for an oculoplastic   consultation.  My assessment and plan are below.  For further details, please see my attached clinic note.           HPI:     Chief Complaint(s) and History of Present Illness(es)     Droopy Both Upper Lids            Severity: moderate    Onset: chronic    Duration: 2 years    Timing: in the morning, in the evening and when tired    Associated signs and symptoms: lid swelling          Comments    Patient notes droopy eyelids left eye greater than right.  At the end of   the day the lids will overhang her lashes and will manually lift the lids   in order to see better.  Patient notes that this is hereditary.  Denies   pain.  History of right optic nerve sheath meningioma.      Sarah Beth Luna on 2024 at 7:51 AM                 Neema Nixon is a 69 year old female who has noted gradual onset of droopy eyelids over the past years. The droopy eyelid is interfering with activities of daily living including driving, and reading. The patient denies double vision, variability of the eyelid position, or dry eye symptoms.     History of meningioma resection with Dr. Garcia  involving right clinoid region  History of strabismus surgery with Dr. Hernández 2020 ([facility], [facility])    EXAM:     MRD1: 1 mm right eye and 0 mm left eye   Aponeurotic ptosis both eyes  Brow ptosis left compared to right.      VISUAL FIELD:  Right eye untaped:15 degrees Right eye taped:50 degrees  Left eye untaped:10 degrees Left eye taped:50 degrees    Assessment & Plan     Neema Nixon is a 69 year old female with the following diagnoses:   1. Optic neuropathy    2. Involutional ptosis, acquired, bilateral    3. Meningioma (H)       Meningioma s/p resection with Dr. Garcia  as noted above    We discussed options. She is not bothered  by her left brow ptosis or dermatochalasis. She is mainly symptomatic of the belpharoptosis.  Plan:  Bilateral posterior approach ptosis repair 8 mm right eye and 9 mm left eye. [id number]    We did discuss her dermatochalasis may worsen.        The heavy upper eyelids are causing symptoms as documented above. The condition is not secondary to underlying Sjogren's, myasthenia gravis, or polymyositis.   ANTICOAGULATION:          Again, thank you for allowing me to participate in the care of your patient.      Sincerely,    Monika Bucio MD  Department of Ophthalmology and Visual Neurosciences  [facility]    CC: Geri Jerez, OD  [address] [facility] [id number]  Via In Basket

## 2024-07-10 ENCOUNTER — TELEPHONE (OUTPATIENT)
Dept: OPHTHALMOLOGY | Facility: CLINIC | Age: 69
End: 2024-07-10
Payer: COMMERCIAL

## 2024-07-10 PROBLEM — H02.403 INVOLUTIONAL PTOSIS, ACQUIRED, BILATERAL: Status: ACTIVE | Noted: 2024-07-09

## 2024-07-10 NOTE — TELEPHONE ENCOUNTER
Called patient to schedule surgery with Dr. Bucio    Spoke with: Neema    Date of Surgery: 9-23-24     Patient aware of approximate arrival time: No at Pt to get a call a week prior      Location of surgery: [facility]      Pre-Op H&P: [facility]     Informed patient that they need to call to schedule pre-op H&P with PCP within 30 days of surgery date: Yes    Post-Op Appt Dates: 10-9-24 7:45am [place]     Discussed with patient pre-op RN will call 2-3 days prior to surgery with arrival time and instructions:  Yes    Discussed with patient PAC RN will provide arrival time and instructions for surgery at the time of the appointment: [[facility] only]: No      Standard Surgery Packet Sent: Yes 07/10/24  via Mail - Standard      Surgical Soap Discussed with Patient: Yes  - Received:  Local Pharmacy       Additional Information Sent in Packet:          Informed patient that they will need an adult  to bring patient home from surgery: Yes  : - Edwin         Additional Comments:        All patients questions were answered and was instructed to review surgical packet and call back [phone] with any questions or concerns.       Vicki Flores on 7/10/2024 at 9:59 AM

## 2024-07-10 NOTE — TELEPHONE ENCOUNTER
Message left for the patient to call back to get surgery scheduled with Dr. Bucio.     Vicki Flores  Surgery Scheduling Coordinator  Ph: [phone]

## 2024-07-11 NOTE — TELEPHONE ENCOUNTER
Received call from patient that she would like to push her surgery out until November due to travel plans in September and October.    Date Scheduled: 11-11-24  Facility: Surgery Locations: [facility]  Surgeon: Dr. Bucio   Post-op appointment scheduled: 12-4-24   scheduled?: No  Surgery packet/instructions confirmed received?  Yes  Pre op physical/PAC appointment: Nas  Special Considerations:     Vicki Flores  Surgery Scheduling Coordinator  Ph: [phone]

## 2024-07-16 ENCOUNTER — VIRTUAL VISIT (OUTPATIENT)
Dept: GASTROENTEROLOGY | Facility: CLINIC | Age: 69
End: 2024-07-16
Payer: COMMERCIAL

## 2024-07-16 DIAGNOSIS — K20.90 ESOPHAGITIS: ICD-10-CM

## 2024-07-16 PROCEDURE — 99215 OFFICE O/P EST HI 40 MIN: CPT | Mod: 95 | Performed by: INTERNAL MEDICINE

## 2024-07-16 ASSESSMENT — PAIN SCALES - GENERAL: PAINLEVEL: NO PAIN (0)

## 2024-07-16 NOTE — PROGRESS NOTES
"Virtual Visit Details    Type of service:  Video Visit   Video Start Time: 2:29 PM  Video End Time:2:55 PM    Originating Location (pt. Location): Home    Distant Location (provider location):  Off-site  Platform used for Video Visit: [facility]    Gastroenterology Visit for: Neema Nixon 1955   MRN: [id number]     Reason for Visit:  chief complaint    Referred by: [place]  / [id number] [facility] / [place] [address]  Patient Care Team:  Cherry Wood PA-C as PCP - General (Family Medicine)  Mikhail Hernández MD as MD (Ophthalmology)  Cherry Wood PA-C as Assigned PCP  Rick Castaneda DO as Physician (Gastroenterology)  Adama Farrell MD as MD (Emergency Medicine)  Geri Jerez OD as Assigned Surgical Provider    History of Present Illness:   Neema Nixon is a 69 year old female with GERD who is presenting as a follow up patient in consultation at the request of Dr. Wood with a chief complaint of GERD.  ---------------------------------------------------------------  Neema Nixon states that about five months ago the patient was having an increase in symptoms. She has had GERD symptoms since her mid to late 40s. Upper endoscopy was performed in May.     The patient states that she has true heartburn and also has pain/burning that extends into her throat with associated dysphonia. She felt that the time that led to the ED visit she had discomfort in her lungs and radiation to her chest on the left side. Has not had a \"flare\" since. She was placed on a higher dose of omeprazole initially and then down to OTC dosing. She ran out and restarted her usual tagamet and tums as needed.      Avoids chocolate, mint, limits coffee, avoids garlic, peppers, onion, tomato.     See updated BEDQ and Eckardt score below: These patient reported outcomes are pertinent to the HPI and have personally been reviewed.    ---------------------------------------------------------------     Wt Readings " from Last 5 Encounters:   04/24/24 78.5 kg (173 lb)   03/27/24 78 kg (172 lb)   07/26/23 89.4 kg (197 lb)   12/05/22 93.4 kg (205 lb 12.8 oz)   07/14/22 88.5 kg (195 lb)        Esophageal Questionnaire(s)    BEDQ Questionnaire      7/9/2024     2:50 PM   BEDQ Questionnaire: How Often Have You Had the Following?   Trouble eating solid food (meat, bread, vegetables) 0   Trouble eating soft foods (yogurt, jello, pudding) 0   Trouble swallowing liquids 0   Pain while swallowing 0   Coughing or choking while swallowing foods or liquids 0   Total Score: 0         7/9/2024     2:50 PM   BEDQ Questionnaire: Discomfort/Pain Ratings   Eating solid food (meat, bread, vegetables) 0   Eating soft foods (yogurt, jello, pudding) 0   Drinking liquid 0   Total Score: 0       Eckardt Questionnaire      7/9/2024     2:53 PM   Eckardt Questionnaire   Dysphagia 1   Regurgitation 1   Retrosternal Pain 1   Weight Loss (kg) 1   Total Score:  4       Promis 10 Questionnaire      7/9/2024     2:54 PM   PROMIS 10 FLOWSHEET DATA   In general, would you say your health is: 4   In general, would you say your quality of life is: 4   In general, how would you rate your physical health? 4   In general, how would you rate your mental health, including your mood and your ability to think? 4   In general, how would you rate your satisfaction with your social activities and relationships? 4   In general, please rate how well you carry out your usual social activities and roles. (This includes activities at home, at work and in your community, and responsibilities as a parent, child, spouse, employee, friend, etc.) 4   To what extent are you able to carry out your everyday physical activities such as walking, climbing stairs, carrying groceries, or moving a chair? 5   In the past 7 days, how often have you been bothered by emotional problems such as feeling anxious, depressed, or irritable? 2   In the past 7 days, how would you rate your fatigue on  average? 2   In the past 7 days, how would you rate your pain on average, where 0 means no pain, and 10 means worst imaginable pain? 2   Mental health question re-calculation - no clinical value 4   Physical health question re-calculation - no clinical value 4   Pain question re-calculation - no clinical value 4   Global Mental Health Score 16   Global Physical Health Score 17   PROMIS TOTAL - SUBSCORES 33     STUDIES & PROCEDURES:    EGD:   Date: 5/30/24  Impression:  The Z-line was irregular and was found 40 cm from the incisors.        The gastroesophageal flap valve was visualized endoscopically and        classified as Hill Grade II (fold present, opens with respiration).        Patchy mild inflammation characterized by erythema was found in the        gastric antrum. Biopsies were taken with a cold forceps for Helicobacter        pylori testing.        The examined duodenum was normal.                                                                                    Moderate Sedation:        Moderate (conscious) sedation was administered by the nurse and        supervised by the endoscopist. The patient's oxygen saturation, heart        rate, blood pressure and response to care were monitored. Total        physician intraservice time was 8 minutes.   Impression:               - Z-line irregular, 40 cm from the incisors.                             - Gastroesophageal flap valve classified as Hill                             Grade II (fold present, opens with respiration).                             - Gastritis. Biopsied.                             - Normal examined duodenum.   Pathology Report:  A.  GASTRIC, BIOPSY:  - Mild reactive gastropathy  - No H. pylori-like organisms identified on routine staining  - No intestinal metaplasia identified    EGD  7/21/22  LA Grade A (one or more mucosal breaks less than 5 mm, not extending        between tops of 2 mucosal folds) esophagitis with no bleeding was found         39 to 40 cm from the incisors. Biopsies were taken with a cold forceps        for histology.        Five non-bleeding superficial gastric ulcers with no stigmata of        bleeding were found in the gastric antrum. The largest lesion was 2 mm        in largest dimension. This was biopsied with a cold forceps for        histology.        Diffuse mildly erythematous mucosa without bleeding was found in the        stomach. Biopsies were taken with a cold forceps for Helicobacter pylori        testing.        The exam was otherwise without abnormality.   Pathology  A.  ANTRUM ULCER, BIOPSY:  Gastric antral mucosa with severe reactive antral gastropathy with surface epithelial erosion; negative for intestinal metaplasia or dysplasia; report of H. pylori immunohistochemistry to follow      B.  STOMACH, ANTRUM, BIOPSY:  Gastric antral & fundic mucosa with mild reactive antral gastropathy; no significant inflammation or H. pylori-like organism on routine staining; negative for intestinal metaplasia or dysplasia    C.  STOMACH, BODY, BIOPSY:  Gastric fundic mucosa with focal congestion and loss of oxyntic cells but no significant inflammation or H. pylori-like organism on routine staining; negative for intestinal metaplasia or dysplasia    D.  DISTAL ESOPHAGUS, BIOPSY:  Squamous esophageal mucosa with no intraepithelial eosinophils or other abnormality       Note: a piece of unremarkable gastric fundic mucosa present, likely from prior sample, part C    Colonoscopy:  Date: 7/27/21  Impression:  The perianal and digital rectal examinations were normal.        Multiple small and large-mouthed diverticula were found in the sigmoid        colon and descending colon.        The exam was otherwise without abnormality.                                                                                    Moderate Sedation:        Moderate (conscious) sedation was administered by the endoscopy nurse        and supervised by the  endoscopist. The patient's oxygen saturation,        heart rate, blood pressure and response to care were monitored. Total        physician intraservice time was 29 minutes.   Impression:               - Diverticulosis in the sigmoid colon and in the                             descending colon.                             - The examination was otherwise normal.                             - No specimens collected.   Pathology Report:     EndoFLIP directed at the UES or LES (8cm (EF-325) balloon length or 16cm (EF-322) balloon length):   Date:  8cm balloon  Balloon inflation Balloon pressure CSA (mm^2) DI (mm^2/mmHg) Dmin (mm) Compliance   20 (ladmark ID)        30        40        50           16cm balloon  Balloon inflation Balloon pressure CSA (mm^2) DI (mm^2/mmHg) Dmin (mm) Compliance   30 (ladmark ID)        40        50        60        70           High Resolution Manometry:  Date:  Impression:    PH/Impedance:  Date:  Impression:     Bravo:  48 or 96hr  Date:  Impression:    CT:  Date:  Impression:    Esophagram:  Date:  Impression:    Modified Barium Esophagram:  Date:  Impression:     Prior medical records were reviewed including, but not limited to, notes from referring providers, lab work, radiographic tests, and other diagnostic tests. Pertinent results were summarized above.     History     Past Medical History:   Diagnosis Date    Arthritis 2020    Left knee issue started a few months ago and arthritis in hands seversl yesrs ago    Double vision 10/23/2019    GERD (gastroesophageal reflux disease) 06/01/2010    Meningioma (H) 07/24/2020    PONV (postoperative nausea and vomiting)     Strabismus 8/2019    Vision loss of right eye 07/24/2020       Past Surgical History:   Procedure Laterality Date    COLONOSCOPY WITH CO2 INSUFFLATION N/A 07/27/2021    Procedure: COLONOSCOPY, WITH CO2 INSUFFLATION;  Surgeon: Sheri Summers DO;  Location: [facility] OR    COMBINED ESOPHAGOSCOPY, GASTROSCOPY, DUODENOSCOPY (EGD)  WITH CO2 INSUFFLATION N/A 07/21/2022    Procedure: ESOPHAGOGASTRODUODENOSCOPY, WITH CO2 INSUFFLATION;  Surgeon: Mukesh Silva MD;  Location: [facility] OR    COMBINED ESOPHAGOSCOPY, GASTROSCOPY, DUODENOSCOPY (EGD) WITH CO2 INSUFFLATION N/A 05/30/2024    Procedure: Combined Esophagoscopy, Gastroscopy, Duodenoscopy (Egd) With Co2 Insufflation;  Surgeon: Sheri Summers DO;  Location: [facility] OR    ESOPHAGOSCOPY, GASTROSCOPY, DUODENOSCOPY (EGD), COMBINED N/A 07/21/2022    Procedure: ESOPHAGOGASTRODUODENOSCOPY, WITH BIOPSY;  Surgeon: Mukesh Silva MD;  Location: [facility] OR    ESOPHAGOSCOPY, GASTROSCOPY, DUODENOSCOPY (EGD), COMBINED N/A 05/30/2024    Procedure: ESOPHAGOGASTRODUODENOSCOPY, WITH BIOPSY;  Surgeon: Sheri Summers DO;  Location: [facility] OR    INSERT DRAIN LUMBAR N/A 08/20/2020    Procedure: INSERTION, DRAIN, SPINE, LUMBAR;  Surgeon: Deni De Paz MD;  Location: [facility] OR    LEG SURGERY Right 2013    after fracture    OPTICAL TRACKING SYSTEM CRANIOTOMY, EXCISE TUMOR, COMBINED Right 08/20/2020    Procedure: stealth assisted Frontotemporal craniotomy for tumor with stereotactic image guidance;  Surgeon: Deni De Paz MD;  Location: [facility] OR    RECESSION RESECTION WITH ADJUSTABLE SUTURE BILATERAL Bilateral 02/03/2020    Procedure: BILATERAL STRABISMUS REPAIR;  Surgeon: Mikhail Hernández MD;  Location:  OR    STRABISMUS SURGERY  02/2020    Went well       Social History     Socioeconomic History    Marital status:      Spouse name: Not on file    Number of children: Not on file    Years of education: Not on file    Highest education level: Not on file   Occupational History    Not on file   Tobacco Use    Smoking status: Never     Passive exposure: Never    Smokeless tobacco: Never   Vaping Use    Vaping status: Never Used   Substance and Sexual Activity    Alcohol use: Yes     Comment: Once a week - one cocktail    Drug use: Not Currently     Types: Marijuana    Sexual  activity: Yes     Partners: Male     Birth control/protection: Post-menopausal   Other Topics Concern    Parent/sibling w/ CABG, MI or angioplasty before 65F 55M? Not Asked   Social History Narrative    Not on file     Social Determinants of Health     Financial Resource Strain: Not on file   Food Insecurity: Not on file   Transportation Needs: Not on file   Physical Activity: Sufficiently Active (4/10/2023)    [facility]     Exercise Vital Sign     Days of Exercise per Week: 5 days     Minutes of Exercise per Session: 40 min   Stress: Not on file   Social Connections: Not on file   Interpersonal Safety: Low Risk  (4/24/2024)    Interpersonal Safety     Do you feel physically and emotionally safe where you currently live?: Yes     Within the past 12 months, have you been hit, slapped, kicked or otherwise physically hurt by someone?: No     Within the past 12 months, have you been humiliated or emotionally abused in other ways by your partner or ex-partner?: No   Housing Stability: Not on file       Family History   Problem Relation Age of Onset    Hypertension Mother         At age 87 following head trauma til death st age 93    Dementia Mother     Glasses (<7 y/o) Mother         From age 30    Chronic Obstructive Pulmonary Disease Father     Hypertension Father         Age 35 on    Macular Degeneration Father         From age 81 on    Glasses (<7 y/o) Father         From age 60    Glasses (<7 y/o) Brother         From age 10 on    Glaucoma No family hx of      Family history reviewed and edited as appropriate    Medications and Allergies:     Outpatient Encounter Medications as of 7/16/2024   Medication Sig Dispense Refill    artificial tears OINT ophthalmic ointment Place into both eyes At Bedtime       cyanocobalamin (VITAMIN B-12) 1000 MCG tablet Take by mouth every morning       loratadine (CLARITIN) 10 MG tablet Take 10 mg by mouth as needed for allergies       omeprazole (PRILOSEC) 40 MG DR capsule TAKE 1 CAPSULE(40 MG) BY MOUTH DAILY 30 capsule 2    sodium chloride (OCEAN) 0.65 % nasal spray Spray 1 spray into both nostrils daily as needed for congestion OTC      sucralfate (CARAFATE) 1 GM tablet TAKE 1 TABLET(1 GRAM) BY MOUTH TWICE DAILY AS NEEDED FOR NAUSEA OR GERD 60 tablet 0    Vitamin D, Cholecalciferol, 400 units TABS Take 1 tablet by mouth every morning        No facility-administered encounter medications on file as of 7/16/2024.        Allergies   Allergen Reactions    Sulfa Antibiotics Rash        Review of systems:  A full 10 point review of systems was obtained and was negative except for the pertinent positives and negatives stated within the HPI.    Objective Findings:   Physical Exam:    Constitutional: LMP  (LMP Unknown)   General: Alert, cooperative, no distress, well-appearing  Head: Atraumatic, normocephalic, no obvious abnormalities   Eyes: EOMI, Sclera anicteric, no obvious conjunctival hemorrhage   Nose: Nares normal, no obvious malformation, no obvious rhinorrhea   Respiratory: normal respirations, no cough, +dysphonia  Musculoskeletal: Range of motion intact, no obvious strength deficit  Skin: No jaundice, no obvious rash  Neurologic: AAOx3, no obvious neurologic abnormality  Psychiatric: Normal Affect, appropriate mood  Extremities: No obvious edema, no obvious malformation     Labs, Radiology, Pathology     Lab Results   Component Value Date    WBC 10.7 08/22/2020    WBC 13.9 (H) 08/21/2020    WBC 6.2 08/20/2020    HGB 9.5 (L) 08/22/2020    HGB 11.4 (L) 08/21/2020    HGB 15.2 08/20/2020     08/22/2020     08/21/2020     08/20/2020    CHOL 164 04/28/2021    TRIG 62 04/28/2021    HDL 70 04/28/2021    ALT 35 05/17/2022    AST 20 05/17/2022     08/22/2020     08/21/2020     08/20/2020    BUN 12 08/22/2020    BUN 15 08/21/2020    BUN 17 08/20/2020    CO2 29 08/22/2020    CO2 28 08/21/2020    CO2 26 08/20/2020     TSH 2.03 06/16/2022    INR 1.03 08/20/2020    INR 0.96 07/29/2020        Liver Function Studies -   Recent Labs   Lab Test 05/17/22  0741   PROTTOTAL 7.3   ALBUMIN 3.8   BILITOTAL 0.6   ALKPHOS 88   AST 20   ALT 35          Patient Active Problem List    Diagnosis Date Noted    Involutional ptosis, acquired, bilateral 07/09/2024     Priority: Medium    Osteopenia, unspecified location 04/28/2021     Priority: Medium    Meningioma (H) 07/24/2020     Priority: Medium     Added automatically from request for surgery [id number]      Vision loss of right eye 07/24/2020     Priority: Medium     Added automatically from request for surgery [id number]      GERD (gastroesophageal reflux disease) 06/01/2010     Priority: Medium      Assessment and Plan   Assessment:    Neema Nixon is a 69 year old female with GERD who is presenting as a follow up patient in consultation at the request of Dr. Wood with a chief complaint of GERD.    The patient was seen with specific questions surrounding her reflux management and concerns that she may not be managing correctly. I reassured her that in the absence of LA grades B, C, or D esophagitis on prior endoscopy she does not require chronic PPI therapy. LA grade A esophagitis is not diagnostic for pathologic reflux and I personally reviewed the endoscopic images with her. The findings were minimal. We discussed dietary elimination and that a targeted approach is appropriate in that if she has trigger foods she can avoid those specific foods and she does not need to globally avoid various foods/food groups. Her current approach with utilizing over the counter H2 blockers and as needed tums is appropriate for her symptoms. I counseled her that if she is intending on eating foods that she knows are triggers for her or if she will be having larger meals, for example over the holidays, she could begin taking an over the counter PPI 30-60 minutes daily for several days leading up to the meal  and could continue for as long as is needed.    1. Esophagitis       No orders of the defined types were placed in this encounter.     Plan:  You may continue to take your over the counter H2 (tagamet) blockers and antacids (tums).  If your symptoms worsen and you require stronger acid suppression you may take over the counter PPIs (nexium, prilosec etc)  If you will be eating or drinking known triggers for your reflux begin taking a PPI several days prior to those meals. PPIs are best taken 30-60 minutes before breakfast.   Continue with dietary and lifestyle changes with head of bed elevation and  the last meal of the day from lying flat by 3-4 hours    Follow up plan:   Return to clinic as needed.    The risks and benefits of my recommendations, as well as other treatment options were discussed with the patient and any available family today. All questions were answered.     Follow up: As planned above. Today, I personally spent 26 minutes in direct face to face time with the patient, of which greater than 50% of the time was spent in patient education and counseling as described above. Approximately 15 minutes were spent on indirect care associated with the patient's consultation including but not limited to review of: patient medical records to date, clinic visits, hospital records, lab results, imaging studies, procedural documentation, and coordinating care with other providers. The findings from this review are summarized in the above note. All of the above accounted for a cumulative time of 41 minutes and was performed on the date of service.     The patient verbalized understanding of the plan and was appreciative for the time spent and information provided during the office visit.     Author:   Rick Castaneda DO   of Medicine  Director, Esophageal Disorders Program  Division of Gastroenterology, Hepatology, and Nutrition  [facility]    Dr. Castaneda speaks for  [employer] regarding dupilumab and has participated in advisory boards for the medication. He receives income for these activities. When discussing the medication, patients were informed of Dr. Castaneda's role and potential conflict of interest. All questions and/or concerns were answered during the encounter.     Documentation assisted by voice recognition and documentation system.

## 2024-07-16 NOTE — NURSING NOTE
Current patient location:  [place]    Is the patient currently in the state of [place]? YES    Visit mode:VIDEO    If the visit is dropped, the patient can be reconnected by: VIDEO VISIT: Text to cell phone:   Telephone Information:   Mobile [phone]       Will anyone else be joining the visit? NO  (If patient encounters technical issues they should call [phone]    How would you like to obtain your AVS? MyChart    Are changes needed to the allergy or medication list? No    Are refills needed on medications prescribed by this physician? NO    Reason for visit: Consult    Vaughn GALAVIZ

## 2024-07-18 NOTE — PATIENT INSTRUCTIONS
It was a pleasure taking care of you today.  I've included a brief summary of our discussion and care plan from today's visit below.  Please review this information with your primary care provider.        You may continue to take your over the counter H2 (tagamet) blockers and antacids (tums).  If your symptoms worsen and you require stronger acid suppression you may take over the counter PPIs (nexium, prilosec etc)  If you will be eating or drinking known triggers for your reflux begin taking a PPI several days prior to those meals. PPIs are best taken 30-60 minutes before breakfast.   Continue with dietary and lifestyle changes with head of bed elevation and  the last meal of the day from lying flat by 3-4 hours      Please call my nurse care coordinator Myron ([phone]) or Va ([phone]), with any questions or concerns.    If you feel you received exceptional care and are interested in supporting the clinical and research goals of Dr. Castaneda or the Division of Gastroenterology, Hepatology, and Nutrition please contact him directly through [employer]  to discuss opportunities to donate.    Sincerely,    Rick Castaneda, DO   of Medicine  Director, Esophageal Disorders Program  Division of Gastroenterology, Hepatology, and Nutrition  [facility]      Please see below for any additional questions and scheduling guidelines.    Sign up for [employer]: [employer] patient portal serves as a secure platform for accessing your medical records from the [facility]. Additionally, [employer] facilitates easy, timely, and secure messaging with your care team. If you have not signed up, you may do so by using the provided code or calling [phone].    Coordinating your care after your visit:  There are multiple options for scheduling your follow-up care based on your provider's recommendation.    How do I schedule a follow-up clinic appointment:   After your appointment, you  may receive scheduling assistance with the Clinic Coordinators by having a seat in the waiting room and a Clinic Coordinator will call you up to schedule.  Virtual visits or after you leave the clinic:  Your provider has placed a follow-up order in the [facility] portal for scheduling your return appointment. A member of the scheduling team will contact you to schedule.  [facility] Scheduling: Timely scheduling through Klip is advised to ensure appointment availability.   Call to schedule: You may schedule your follow-up appointment(s) by calling [phone], option 1.    How do I schedule my endoscopy or colonoscopy procedure:  If a procedure, such as a colonoscopy or upper endoscopy was ordered by your provider, the scheduling team will contact you to schedule this procedure. Or you may choose to call to schedule at   [phone], option 2.  Please allow 20-30 minutes when scheduling a procedure.    How do I get my blood work done? To get your blood work done, you need to schedule a lab appointment at an [facility]. There are multiple ways to schedule:   At the clinic: The Clinic Coordinator you meet after your visit can help you schedule a lab appointment.   [facility]lip scheduling: Klip offers online lab scheduling at all [facility] laboratory locations.   Call to schedule: You can call [phone] to schedule your lab appointment.    How do I schedule my imaging study: To schedule imaging studies, such as CT scans, ultrasounds, MRIs, or X-rays, contact Imaging Services at [phone].    How do I schedule a referral to another doctor: If your provider recommended a referral to another specialist(s), the referral order was placed by your provider. You will receive a phone call to schedule this referral, or you may choose to call the number attached to the referral to self-schedule.    For Post-Visit Question(s):  For any inquiries following today's visit:  Please utilize MyChart messaging  and allow 48 hours for reply or contact the Call Center during normal business hours at [phone], option 3.  For Emergent After-hours questions, contact the On-Call GI Fellow through the [facility]  at [phone].  In addition, you may contact your Nurse directly using the provided contact information.    Test Results:  Test results will be accessible via "[employer] in compliance with the 21st Century Cures Act. This means that your results will be available to you at the same time as your provider. Often you may see your results before your provider does. Results are reviewed by staff within two weeks with communication follow-up. Results may be released in the patient portal prior to your care team review.    Prescription Refill(s):  Medication prescribed by your provider will be addressed during your visit. For future refills, please coordinate with your pharmacy. If you have not had a recent clinic visit or routine labs, for your safety, your provider may not be able to refill your prescription.

## 2024-07-31 SDOH — HEALTH STABILITY: PHYSICAL HEALTH: ON AVERAGE, HOW MANY DAYS PER WEEK DO YOU ENGAGE IN MODERATE TO STRENUOUS EXERCISE (LIKE A BRISK WALK)?: 4 DAYS

## 2024-07-31 SDOH — HEALTH STABILITY: PHYSICAL HEALTH: ON AVERAGE, HOW MANY MINUTES DO YOU ENGAGE IN EXERCISE AT THIS LEVEL?: 50 MIN

## 2024-07-31 ASSESSMENT — SOCIAL DETERMINANTS OF HEALTH (SDOH): HOW OFTEN DO YOU GET TOGETHER WITH FRIENDS OR RELATIVES?: MORE THAN THREE TIMES A WEEK

## 2024-08-05 ENCOUNTER — OFFICE VISIT (OUTPATIENT)
Dept: FAMILY MEDICINE | Facility: CLINIC | Age: 69
End: 2024-08-05
Attending: PHYSICIAN ASSISTANT
Payer: COMMERCIAL

## 2024-08-05 VITALS
HEIGHT: 67 IN | HEART RATE: 66 BPM | OXYGEN SATURATION: 100 % | DIASTOLIC BLOOD PRESSURE: 78 MMHG | WEIGHT: 166 LBS | SYSTOLIC BLOOD PRESSURE: 124 MMHG | BODY MASS INDEX: 26.06 KG/M2 | TEMPERATURE: 97.3 F | RESPIRATION RATE: 16 BRPM

## 2024-08-05 DIAGNOSIS — Z00.00 ENCOUNTER FOR MEDICARE ANNUAL WELLNESS EXAM: Primary | ICD-10-CM

## 2024-08-05 PROCEDURE — G0439 PPPS, SUBSEQ VISIT: HCPCS | Performed by: PHYSICIAN ASSISTANT

## 2024-08-05 NOTE — PATIENT INSTRUCTIONS
Patient Education   Preventive Care Advice   This is general advice given by our system to help you stay healthy. However, your care team may have specific advice just for you. Please talk to your care team about your preventive care needs.  Nutrition  Eat 5 or more servings of fruits and vegetables each day.  Try wheat bread, brown rice and whole grain pasta (instead of white bread, rice, and pasta).  Get enough calcium and vitamin D. Check the label on foods and aim for 100% of the RDA (recommended daily allowance).  Lifestyle  Exercise at least 150 minutes each week  (30 minutes a day, 5 days a week).  Do muscle strengthening activities 2 days a week. These help control your weight and prevent disease.  No smoking.  Wear sunscreen to prevent skin cancer.  Have a dental exam and cleaning every 6 months.  Yearly exams  See your health care team every year to talk about:  Any changes in your health.  Any medicines your care team has prescribed.  Preventive care, family planning, and ways to prevent chronic diseases.  Shots (vaccines)   HPV shots (up to age 26), if you've never had them before.  Hepatitis B shots (up to age 59), if you've never had them before.  COVID-19 shot: Get this shot when it's due.  Flu shot: Get a flu shot every year.  Tetanus shot: Get a tetanus shot every 10 years.  Pneumococcal, hepatitis A, and RSV shots: Ask your care team if you need these based on your risk.  Shingles shot (for age 50 and up)  General health tests  Diabetes screening:  Starting at age 35, Get screened for diabetes at least every 3 years.  If you are younger than age 35, ask your care team if you should be screened for diabetes.  Cholesterol test: At age 39, start having a cholesterol test every 5 years, or more often if advised.  Bone density scan (DEXA): At age 50, ask your care team if you should have this scan for osteoporosis (brittle bones).  Hepatitis C: Get tested at least once in your life.  STIs (sexually  transmitted infections)  Before age 24: Ask your care team if you should be screened for STIs.  After age 24: Get screened for STIs if you're at risk. You are at risk for STIs (including HIV) if:  You are sexually active with more than one person.  You don't use condoms every time.  You or a partner was diagnosed with a sexually transmitted infection.  If you are at risk for HIV, ask about PrEP medicine to prevent HIV.  Get tested for HIV at least once in your life, whether you are at risk for HIV or not.  Cancer screening tests  Cervical cancer screening: If you have a cervix, begin getting regular cervical cancer screening tests starting at age 21.  Breast cancer scan (mammogram): If you've ever had breasts, begin having regular mammograms starting at age 40. This is a scan to check for breast cancer.  Colon cancer screening: It is important to start screening for colon cancer at age 45.  Have a colonoscopy test every 10 years (or more often if you're at risk) Or, ask your provider about stool tests like a FIT test every year or Cologuard test every 3 years.  To learn more about your testing options, visit:   .  For help making a decision, visit:   https://bit.ly/ah98957.  Prostate cancer screening test: If you have a prostate, ask your care team if a prostate cancer screening test (PSA) at age 55 is right for you.  Lung cancer screening: If you are a current or former smoker ages 50 to 80, ask your care team if ongoing lung cancer screenings are right for you.  For informational purposes only. Not to replace the advice of your health care provider. Copyright   2023 [employer]. All rights reserved. Clinically reviewed by the [facility]. [employer] [id number] - REV 01/24.

## 2024-08-05 NOTE — PROGRESS NOTES
"Preventive Care Visit  [facility] Wood PA-C, Family Medicine  Aug 5, 2024      Assessment & Plan     Encounter for Medicare annual wellness exam      HEALTH CARE MAINTENANCE              Reviewed USPTF recommendations and anticipatory guidance.              See orders.               BMI  Estimated body mass index is 25.81 kg/m  as calculated from the following:    Height as of this encounter: 1.708 m (5' 7.25\").    Weight as of this encounter: 75.3 kg (166 lb).       Counseling  Appropriate preventive services were addressed with this patient via screening, questionnaire, or discussion as appropriate for fall prevention, nutrition, physical activity, Tobacco-use cessation, weight loss and cognition.  Checklist reviewing preventive services available has been given to the patient.  Reviewed patient's diet, addressing concerns and/or questions.           Yasmin Bethea is a 69 year old, presenting for the following:  Physical        8/5/2024     6:52 AM   Additional Questions   Roomed by Gaby   Accompanied by Self         8/5/2024     6:52 AM   Patient Reported Additional Medications   Patient reports taking the following new medications NA         Health Care Directive  Patient has a Health Care Directive on file  Advance care planning document is on file and is current.    HPI      Patient arrived for Annual Physical, undressing for breast exam.     Patient is not fasting for lab work.    No questions or concerns at todays visit.     GERD:  met with GI and switched from omeprazole to tagamet.  Uses this just once per week PRN. Doing well.            7/31/2024   General Health   How would you rate your overall physical health? Good   Feel stress (tense, anxious, or unable to sleep) To some extent      (!) STRESS CONCERN      7/31/2024   Nutrition   Diet: Other   If other, please elaborate: Do not consume foods that aggravate my GERDS - tomatosles, citrus, mint, chocolate. " Limited fried foods, coffee and alcohol.            7/31/2024   Exercise   Days per week of moderate/strenous exercise 4 days   Average minutes spent exercising at this level 50 min            7/31/2024   Social Factors   Frequency of gathering with friends or relatives More than three times a week   Worry food won't last until get money to buy more No   Food not last or not have enough money for food? No   Do you have housing? (Housing is defined as stable permanent housing and does not include staying ouside in a car, in a tent, in an abandoned building, in an overnight shelter, or couch-surfing.) Yes   Are you worried about losing your housing? No   Lack of transportation? No   Unable to get utilities (heat,electricity)? No            7/31/2024   Fall Risk   Fallen 2 or more times in the past year? No    No   Trouble with walking or balance? No    No       Multiple values from one day are sorted in reverse-chronological order          7/31/2024   Activities of Daily Living- Home Safety   Needs help with the following daily activites None of the above   Safety concerns in the home None of the above            7/31/2024   Dental   Dentist two times every year? Yes            7/31/2024   Hearing Screening   Hearing concerns? None of the above            7/31/2024   Driving Risk Screening   Patient/family members have concerns about driving No            7/31/2024   General Alertness/Fatigue Screening   Have you been more tired than usual lately? No            7/31/2024   Urinary Incontinence Screening   Bothered by leaking urine in past 6 months No            7/31/2024   TB Screening   Were you born outside of the [place]? No            Today's PHQ-2 Score:       8/5/2024     6:39 AM   PHQ-2 ( 1999 [employer])   Q1: Little interest or pleasure in doing things 0   Q2: Feeling down, depressed or hopeless 0   PHQ-2 Score 0   Q1: Little interest or pleasure in doing things Not at all   Q2: Feeling down, depressed or hopeless Not  at all   PHQ-2 Score 0           7/31/2024   Substance Use   Alcohol more than 3/day or more than 7/wk No   Do you have a current opioid prescription? No   How severe/bad is pain from 1 to 10? 0/10 (No Pain)   Do you use any other substances recreationally? No        Social History     Tobacco Use    Smoking status: Never     Passive exposure: Never    Smokeless tobacco: Never   Vaping Use    Vaping status: Never Used   Substance Use Topics    Alcohol use: Yes     Comment: Once a week - one cocktail    Drug use: Not Currently     Types: Marijuana           6/10/2024   LAST FHS-7 RESULTS   1st degree relative breast or ovarian cancer No    No   Any relative bilateral breast cancer No    No   Any male have breast cancer No    No   Any ONE woman have BOTH breast AND ovarian cancer No    No   Any woman with breast cancer before 50yrs No    No   2 or more relatives with breast AND/OR ovarian cancer No    No   2 or more relatives with breast AND/OR bowel cancer No    No       Multiple values from one day are sorted in reverse-chronological order        Mammogram Screening - Mammogram every 1-2 years updated in Health Maintenance based on mutual decision making    ASCVD Risk   The 10-year ASCVD risk score (Twyla NGUYEN, et al., 2019) is: 9.1%    Values used to calculate the score:      Age: 69 years      Sex: Female      Is Non- : No      Diabetic: No      Tobacco smoker: No      Systolic Blood Pressure: 124 mmHg      Is BP treated: No      HDL Cholesterol: 70 mg/dL      Total Cholesterol: 164 mg/dL            Reviewed and updated as needed this visit by Provider                    Past Medical History:   Diagnosis Date    Arthritis 2020    Left knee issue started a few months ago and arthritis in hands seversl yesrs ago    Double vision 10/23/2019    GERD (gastroesophageal reflux disease) 06/01/2010    Meningioma (H) 07/24/2020    PONV (postoperative nausea and vomiting)     Strabismus 8/2019     Vision loss of right eye 07/24/2020     Past Surgical History:   Procedure Laterality Date    COLONOSCOPY WITH CO2 INSUFFLATION N/A 07/27/2021    Procedure: COLONOSCOPY, WITH CO2 INSUFFLATION;  Surgeon: Sheri Summers DO;  Location: [facility] OR    COMBINED ESOPHAGOSCOPY, GASTROSCOPY, DUODENOSCOPY (EGD) WITH CO2 INSUFFLATION N/A 07/21/2022    Procedure: ESOPHAGOGASTRODUODENOSCOPY, WITH CO2 INSUFFLATION;  Surgeon: Mukesh Silva MD;  Location: [facility] OR    COMBINED ESOPHAGOSCOPY, GASTROSCOPY, DUODENOSCOPY (EGD) WITH CO2 INSUFFLATION N/A 05/30/2024    Procedure: Combined Esophagoscopy, Gastroscopy, Duodenoscopy (Egd) With Co2 Insufflation;  Surgeon: Sheri Summers DO;  Location: [facility] OR    ESOPHAGOSCOPY, GASTROSCOPY, DUODENOSCOPY (EGD), COMBINED N/A 07/21/2022    Procedure: ESOPHAGOGASTRODUODENOSCOPY, WITH BIOPSY;  Surgeon: Mukesh Silva MD;  Location: [facility] OR    ESOPHAGOSCOPY, GASTROSCOPY, DUODENOSCOPY (EGD), COMBINED N/A 05/30/2024    Procedure: ESOPHAGOGASTRODUODENOSCOPY, WITH BIOPSY;  Surgeon: Sheri Summers DO;  Location: [facility] OR    INSERT DRAIN LUMBAR N/A 08/20/2020    Procedure: INSERTION, DRAIN, SPINE, LUMBAR;  Surgeon: Deni De Paz MD;  Location: [facility] OR    LEG SURGERY Right 2013    after fracture    OPTICAL TRACKING SYSTEM CRANIOTOMY, EXCISE TUMOR, COMBINED Right 08/20/2020    Procedure: stealth assisted Frontotemporal craniotomy for tumor with stereotactic image guidance;  Surgeon: Deni De Paz MD;  Location: [facility] OR    RECESSION RESECTION WITH ADJUSTABLE SUTURE BILATERAL Bilateral 02/03/2020    Procedure: BILATERAL STRABISMUS REPAIR;  Surgeon: Mikhail Hernández MD;  Location: [facility] OR    STRABISMUS SURGERY  02/2020    Went well     Current providers sharing in care for this patient include:  Patient Care Team:  Cherry Wood PA-C as PCP - General (Family Medicine)  Mikhail Hernández MD as MD (Ophthalmology)  Cherry Wood PA-C as Assigned  "PCP  Rick Castaneda DO as Physician (Gastroenterology)  Adama Farrell MD as MD (Emergency Medicine)  Rick Castaneda DO as Assigned Gastroenterology Provider  Monika Bucio MD as Assigned Surgical Provider    The following health maintenance items are reviewed in Epic and correct as of today:  Health Maintenance   Topic Date Due    GLUCOSE  04/28/2024    COVID-19 Vaccine (7 - 2023-24 season) 05/02/2024    ANNUAL REVIEW OF HM ORDERS  07/26/2024    INFLUENZA VACCINE (1) 09/01/2024    EYE EXAM  04/15/2025    MAMMO SCREENING  06/10/2025    MEDICARE ANNUAL WELLNESS VISIT  08/05/2025    FALL RISK ASSESSMENT  08/05/2025    LIPID  04/28/2026    DEXA  06/24/2027    ADVANCE CARE PLANNING  07/26/2028    COLORECTAL CANCER SCREENING  07/27/2031    DTAP/TDAP/TD IMMUNIZATION (3 - Td or Tdap) 07/26/2033    HEPATITIS C SCREENING  Completed    PHQ-2 (once per calendar year)  Completed    Pneumococcal Vaccine: 65+ Years  Completed    ZOSTER IMMUNIZATION  Completed    RSV VACCINE (Pregnancy & 60+)  Completed    IPV IMMUNIZATION  Aged Out    HPV IMMUNIZATION  Aged Out    MENINGITIS IMMUNIZATION  Aged Out    RSV MONOCLONAL ANTIBODY  Aged Out         Review of Systems  Constitutional, neuro, ENT, endocrine, pulmonary, cardiac, gastrointestinal, genitourinary, musculoskeletal, integument and psychiatric systems are negative, except as otherwise noted.     Objective    Exam  /78 (BP Location: Left arm, Patient Position: Chair, Cuff Size: Adult Regular)   Pulse 66   Temp 97.3  F (36.3  C) (Tympanic)   Resp 16   Ht 1.708 m (5' 7.25\")   Wt 75.3 kg (166 lb)   LMP  (LMP Unknown)   SpO2 100%   BMI 25.81 kg/m     Estimated body mass index is 25.81 kg/m  as calculated from the following:    Height as of this encounter: 1.708 m (5' 7.25\").    Weight as of this encounter: 75.3 kg (166 lb).    Physical Exam  GENERAL: alert and no distress  EYES: Eyes grossly normal to inspection, PERRL and conjunctivae and sclerae normal  HENT: ear " canals and TM's normal, nose and mouth without ulcers or lesions  NECK: no adenopathy, no asymmetry, masses, or scars  RESP: lungs clear to auscultation - no rales, rhonchi or wheezes  BREAST: normal without masses, tenderness or nipple discharge and no palpable axillary masses or adenopathy  CV: regular rate and rhythm, normal S1 S2, no S3 or S4, no murmur, click or rub, no peripheral edema  ABDOMEN: soft, nontender, no hepatosplenomegaly, no masses and bowel sounds normal  MS: no gross musculoskeletal defects noted, no edema  SKIN: no suspicious lesions or rashes  NEURO: Normal strength and tone, mentation intact and speech normal  PSYCH: mentation appears normal, affect normal/bright         8/5/2024   Mini Cog   Clock Draw Score 2 Normal   3 Item Recall 3 objects recalled   Mini Cog Total Score 5                 Signed Electronically by: Cherry Wood PA-C

## 2024-11-04 ENCOUNTER — OFFICE VISIT (OUTPATIENT)
Dept: FAMILY MEDICINE | Facility: CLINIC | Age: 69
End: 2024-11-04
Payer: COMMERCIAL

## 2024-11-04 VITALS
WEIGHT: 167 LBS | HEIGHT: 67 IN | TEMPERATURE: 97.2 F | DIASTOLIC BLOOD PRESSURE: 70 MMHG | OXYGEN SATURATION: 98 % | BODY MASS INDEX: 26.21 KG/M2 | HEART RATE: 57 BPM | RESPIRATION RATE: 16 BRPM | SYSTOLIC BLOOD PRESSURE: 128 MMHG

## 2024-11-04 DIAGNOSIS — Z01.818 PREOP GENERAL PHYSICAL EXAM: Primary | ICD-10-CM

## 2024-11-04 DIAGNOSIS — H02.403 INVOLUTIONAL PTOSIS, ACQUIRED, BILATERAL: ICD-10-CM

## 2024-11-04 DIAGNOSIS — K21.00 GASTROESOPHAGEAL REFLUX DISEASE WITH ESOPHAGITIS WITHOUT HEMORRHAGE: ICD-10-CM

## 2024-11-04 PROCEDURE — 99213 OFFICE O/P EST LOW 20 MIN: CPT | Performed by: PHYSICIAN ASSISTANT

## 2024-11-04 NOTE — PROGRESS NOTES
Preoperative Evaluation  [facility] [place]  [id number] [address]  [place] [address]  Phone: [phone]  Primary Provider: Cherry Wood PA-C  Pre-op Performing Provider: Cherry Wood PA-C  Nov 4, 2024             10/30/2024   Surgical Information   What procedure is being done? Bilateral eyelid ptosis    Facility or Hospital where procedure/surgery will be performed: [facility]    Who is doing the procedure / surgery? Monika Bucio    Date of surgery / procedure: 11/11/2024    Time of surgery / procedure: 7:30 am    Where do you plan to recover after surgery? at home with family        Patient-reported     Fax number for surgical facility: Note does not need to be faxed, will be available electronically in Epic.    Assessment & Plan     The proposed surgical procedure is considered LOW risk.    Preop general physical exam  Cleared for surgery    Involutional ptosis, acquired, bilateral  Requiring surgery due to decreased vision.     Gastroesophageal reflux disease with esophagitis without hemorrhage  Met with GI.  Recommended PPI PRN if traveling/eating foods that will trigger GERD.             - No identified additional risk factors other than previously addressed    Antiplatelet or Anticoagulation Medication Instructions   - Patient is on no antiplatelet or anticoagulation medications.    Additional Medication Instructions  Take all scheduled medications on the day of surgery    Recommendation  Approval given to proceed with proposed procedure, without further diagnostic evaluation.    Yasmin Bethea is a 69 year old, presenting for the following:  Pre-Op Exam          11/4/2024     6:51 AM   Additional Questions   Roomed by Gaby   Accompanied by Self         11/4/2024     6:51 AM   Patient Reported Additional Medications   Patient reports taking the following new medications NA     HPI related to upcoming procedure: Involutional ptosis, acquired,  bilateral         10/30/2024   Pre-Op Questionnaire   Have you ever had a heart attack or stroke? No    Have you ever had surgery on your heart or blood vessels, such as a stent placement, a coronary artery bypass, or surgery on an artery in your head, neck, heart, or legs? No    Do you have chest pain with activity? No    Do you have a history of heart failure? No    Do you currently have a cold, bronchitis or symptoms of other infection? No    Do you have a cough, shortness of breath, or wheezing? No    Do you or anyone in your family have previous history of blood clots? No    Do you or does anyone in your family have a serious bleeding problem such as prolonged bleeding following surgeries or cuts? No    Have you ever had problems with anemia or been told to take iron pills? No    Have you had any abnormal blood loss such as black, tarry or bloody stools, or abnormal vaginal bleeding? No    Have you ever had a blood transfusion? No    Are you willing to have a blood transfusion if it is medically needed before, during, or after your surgery? Yes    Have you or any of your relatives ever had problems with anesthesia? No    Do you have sleep apnea, excessive snoring or daytime drowsiness? No    Do you have any artifical heart valves or other implanted medical devices like a pacemaker, defibrillator, or continuous glucose monitor? No    Do you have artificial joints? No    Are you allergic to latex? No        Patient-reported     Health Care Directive  Patient has a Health Care Directive on file      Preoperative Review of    reviewed - no record of controlled substances prescribed.      Status of Chronic Conditions:  See problem list for active medical problems.  Problems all longstanding and stable, except as noted/documented.  See ROS for pertinent symptoms related to these conditions.    Patient Active Problem List    Diagnosis Date Noted    Involutional ptosis, acquired, bilateral 07/09/2024      Priority: Medium    Osteopenia, unspecified location 04/28/2021     Priority: Medium    Meningioma (H) 07/24/2020     Priority: Medium     Added automatically from request for surgery [id number]      Vision loss of right eye 07/24/2020     Priority: Medium     Added automatically from request for surgery [id number]      GERD (gastroesophageal reflux disease) 06/01/2010     Priority: Medium      Past Medical History:   Diagnosis Date    Arthritis 2020    Left knee issue started a few months ago and arthritis in hands seversl yesrs ago    Double vision 10/23/2019    GERD (gastroesophageal reflux disease) 06/01/2010    Meningioma (H) 07/24/2020    PONV (postoperative nausea and vomiting)     Strabismus 8/2019    Vision loss of right eye 07/24/2020     Past Surgical History:   Procedure Laterality Date    COLONOSCOPY WITH CO2 INSUFFLATION N/A 07/27/2021    Procedure: COLONOSCOPY, WITH CO2 INSUFFLATION;  Surgeon: Sheri Summers DO;  Location: [facility] OR    COMBINED ESOPHAGOSCOPY, GASTROSCOPY, DUODENOSCOPY (EGD) WITH CO2 INSUFFLATION N/A 07/21/2022    Procedure: ESOPHAGOGASTRODUODENOSCOPY, WITH CO2 INSUFFLATION;  Surgeon: Mukesh Silva MD;  Location: [facility] OR    COMBINED ESOPHAGOSCOPY, GASTROSCOPY, DUODENOSCOPY (EGD) WITH CO2 INSUFFLATION N/A 05/30/2024    Procedure: Combined Esophagoscopy, Gastroscopy, Duodenoscopy (Egd) With Co2 Insufflation;  Surgeon: Sheri Summers DO;  Location: [facility] OR    ESOPHAGOSCOPY, GASTROSCOPY, DUODENOSCOPY (EGD), COMBINED N/A 07/21/2022    Procedure: ESOPHAGOGASTRODUODENOSCOPY, WITH BIOPSY;  Surgeon: Mukesh Silva MD;  Location: [facility] OR    ESOPHAGOSCOPY, GASTROSCOPY, DUODENOSCOPY (EGD), COMBINED N/A 05/30/2024    Procedure: ESOPHAGOGASTRODUODENOSCOPY, WITH BIOPSY;  Surgeon: Sheri Summers DO;  Location: [facility] OR    INSERT DRAIN LUMBAR N/A 08/20/2020    Procedure: INSERTION, DRAIN, SPINE, LUMBAR;  Surgeon: Deni De Paz MD;  Location: [facility] OR    LEG SURGERY Right 2013    after  fracture    OPTICAL TRACKING SYSTEM CRANIOTOMY, EXCISE TUMOR, COMBINED Right 08/20/2020    Procedure: stealth assisted Frontotemporal craniotomy for tumor with stereotactic image guidance;  Surgeon: Deni De Paz MD;  Location: [facility] OR    RECESSION RESECTION WITH ADJUSTABLE SUTURE BILATERAL Bilateral 02/03/2020    Procedure: BILATERAL STRABISMUS REPAIR;  Surgeon: Mikhail Hernández MD;  Location: [facility] OR    STRABISMUS SURGERY  02/2020    Went well     Current Outpatient Medications   Medication Sig Dispense Refill    artificial tears OINT ophthalmic ointment Place into both eyes At Bedtime       cimetidine (TAGAMET) 200 MG tablet Take 1 tablet (200 mg) by mouth daily as needed (heartburn)      cyanocobalamin (VITAMIN B-12) 1000 MCG tablet Take by mouth every morning       loratadine (CLARITIN) 10 MG tablet Take 10 mg by mouth as needed for allergies      sodium chloride (OCEAN) 0.65 % nasal spray Spray 1 spray into both nostrils daily as needed for congestion OTC      sucralfate (CARAFATE) 1 GM tablet TAKE 1 TABLET(1 GRAM) BY MOUTH TWICE DAILY AS NEEDED FOR NAUSEA OR GERD 60 tablet 0    Vitamin D, Cholecalciferol, 400 units TABS Take 1 tablet by mouth every morning          Allergies   Allergen Reactions    Sulfa Antibiotics Rash        Social History     Tobacco Use    Smoking status: Never     Passive exposure: Never    Smokeless tobacco: Never   Substance Use Topics    Alcohol use: Yes     Comment: Once a week - one cocktail     Family History   Problem Relation Age of Onset    Hypertension Mother         At age 87 following head trauma til death st age 93    Dementia Mother     Glasses (<7 y/o) Mother         From age 30    Chronic Obstructive Pulmonary Disease Father     Hypertension Father         Age 35 on    Macular Degeneration Father         From age 81 on    Glasses (<7 y/o) Father         From age 60    Glasses (<7 y/o) Brother         From age 10 on    Glaucoma No family hx of   "    History   Drug Use Unknown             Review of Systems  Constitutional, neuro, ENT, endocrine, pulmonary, cardiac, gastrointestinal, genitourinary, musculoskeletal, integument and psychiatric systems are negative, except as otherwise noted.    Objective    /70 (BP Location: Left arm, Patient Position: Chair, Cuff Size: Adult Regular)   Pulse 57   Temp 97.2  F (36.2  C) (Tympanic)   Resp 16   Ht 1.708 m (5' 7.25\")   Wt 75.8 kg (167 lb)   LMP  (LMP Unknown)   SpO2 98%   BMI 25.96 kg/m     Estimated body mass index is 25.96 kg/m  as calculated from the following:    Height as of this encounter: 1.708 m (5' 7.25\").    Weight as of this encounter: 75.8 kg (167 lb).  Physical Exam  GENERAL: alert and no distress  EYES: Eyes grossly normal to inspection, PERRL and conjunctivae and sclerae normal  HENT: ear canals and TM's normal, nose and mouth without ulcers or lesions  NECK: no adenopathy, no asymmetry, masses, or scars  RESP: lungs clear to auscultation - no rales, rhonchi or wheezes  CV: regular rate and rhythm, normal S1 S2, no S3 or S4, no murmur, click or rub, no peripheral edema  ABDOMEN: soft, nontender, no hepatosplenomegaly, no masses and bowel sounds normal  MS: no gross musculoskeletal defects noted, no edema    No results for input(s): \"HGB\", \"PLT\", \"INR\", \"NA\", \"POTASSIUM\", \"CR\", \"A1C\" in the last 8760 hours.     Diagnostics  No labs were ordered during this visit.   No EKG required, no history of coronary heart disease, significant arrhythmia, peripheral arterial disease or other structural heart disease.    Revised Cardiac Risk Index (RCRI)  The patient has the following serious cardiovascular risks for perioperative complications:   - No serious cardiac risks = 0 points     RCRI Interpretation: 0 points: Class I (very low risk - 0.4% complication rate)         Signed Electronically by: Cherry Wood PA-C  A copy of this evaluation report is provided to the requesting " physician.

## 2024-11-04 NOTE — PATIENT INSTRUCTIONS
How to Take Your Medication Before Surgery  Preoperative Medication Instructions   Antiplatelet or Anticoagulation Medication Instructions   - Patient is on no antiplatelet or anticoagulation medications.    Additional Medication Instructions  Take all scheduled medications on the day of surgery       Patient Education   Preparing for Your Surgery  For Adults  Getting started  In most cases, a nurse will call to review your health history and instructions. They will give you an arrival time based on your scheduled surgery time. Please be ready to share:  Your doctor's clinic name and phone number  Your medical, surgical, and anesthesia history  A list of allergies and sensitivities  A list of medicines, including herbal treatments and over-the-counter drugs  Whether the patient has a legal guardian (ask how to send us the papers in advance)  Note: You may not receive a call if you were seen at our PAC (Preoperative Assessment Center).  Please tell us if you're pregnant--or if there's any chance you might be pregnant. Some surgeries may injure a fetus (unborn baby), so they require a pregnancy test. Surgeries that are safe for a fetus don't always need a test, and you can choose whether to have one.   Preparing for surgery  Within 10 to 30 days of surgery: Have a pre-op exam (sometimes called an H&P, or History and Physical). This can be done at a clinic or pre-operative center.  If you're having a , you may not need this exam. Talk to your care team.  At your pre-op exam, talk to your care team about all medicines you take. (This includes CBD oil and any drugs, such as THC, marijuana, and other forms of cannabis.) If you need to stop any medicine before surgery, ask when to start taking it again.  This is for your safety. Many medicines and drugs can make you bleed too much during surgery. Some change how well surgery (anesthesia) drugs work.  Call your insurance company to let them know you're having  surgery. (If you don't have insurance, call [phone].)  Call your clinic if there's any change in your health. This includes a scrape or scratch near the surgery site, or any signs of a cold (sore throat, runny nose, cough, rash, fever).  Eating and drinking guidelines  For your safety: Unless your surgeon tells you otherwise, follow the guidelines below.  Eat and drink as normal until 8 hours before you arrive for surgery. After that, no food or milk. You can spit out gum when you arrive.  Drink clear liquids until 2 hours before you arrive. These are liquids you can see through, like water, Gatorade, and Propel Water. They also include plain black coffee and tea (no cream or milk).  No alcohol for 24 hours before you arrive. The night before surgery, stop any drinks that contain THC.  If your care team tells you to take medicine on the morning of surgery, it's okay to take it with a sip of water. No other medicines or drugs are allowed (including CBD oil)--follow your care team's instructions.  If you have questions the day of surgery, call your hospital or surgery center.   Preventing infection  Shower or bathe the night before and the morning of surgery. Follow the instructions your clinic gave you. (If no instructions, use regular soap.)  Don't shave or clip hair near your surgery site. We'll remove the hair if needed.  Don't smoke or vape the morning of surgery. No chewing tobacco for 6 hours before you arrive. A nicotine patch is okay. You may spit out nicotine gum when you arrive.  For some surgeries, the surgeon will tell you to fully quit smoking and nicotine.  We will make every effort to keep you safe from infection. We will:  Clean our hands often with soap and water (or an alcohol-based hand rub).  Clean the skin at your surgery site with a special soap that kills germs.  Give you a special gown to keep you warm. (Cold raises the risk of infection.)  Wear hair covers, masks, gowns, and gloves  during surgery.  Give antibiotic medicine, if prescribed. Not all surgeries need this medicine.  What to bring on the day of surgery  Photo ID and insurance card  Copy of your health care directive, if you have one  Glasses and hearing aids (bring cases)  You can't wear contacts during surgery  Inhaler and eye drops, if you use them (tell us about these when you arrive)  CPAP machine or breathing device, if you use them  A few personal items, if spending the night  If you have . . .  A pacemaker, ICD (cardiac defibrillator), or other implant: Bring the ID card.  An implanted stimulator: Bring the remote control.  A legal guardian: Bring a copy of the certified (court-stamped) guardianship papers.  Please remove any jewelry, including body piercings. Leave jewelry and other valuables at home.  If you're going home the day of surgery  You must have a responsible adult drive you home. They should stay with you overnight as well.  If you don't have someone to stay with you, and you aren't safe to go home alone, we may keep you overnight. Insurance often won't pay for this.  After surgery  If it's hard to control your pain or you need more pain medicine, please call your surgeon's office.  Questions?   If you have any questions for your care team, list them here:   ____________________________________________________________________________________________________________________________________________________________________________________________________________________________________________________________  For informational purposes only. Not to replace the advice of your health care provider. Copyright   2003, 2019 [facility]. All rights reserved. Clinically reviewed by William Stallings MD. [employer] [id number] - REV 08/24.

## 2024-11-06 NOTE — OR NURSING
Occupational Therapy Visit    Visit Type: Daily Treatment Note  Visit: 5  Referring Provider: Salvador Marina MD  Medical Diagnosis (from order): M25.522 - Left elbow pain     SUBJECTIVE                                                                                                               Patient verbally consented to allow observer present during session.    Patient had some swelling and skin irritation after lat epicondyle taping. CMC taping has been helpful, no irritation with CMC taping. Pain/tightness gets a little better following therapy. Isometric exercises are going well and have not increased symptoms.    Pain / Symptoms  - Pain rating (out of 10): Current: 3      OBJECTIVE                                                                                                                                            Treatment    Ultrasound  - Location: Lateral epicondyle  - Position: sitting  - Duty Cycle: 20%  - Frequency: 1 Mhz  - Intensity (w/cm2): 1.0  - Duration: 8 minutes    Results: improved circulation and tissue softening  Reaction: no adverse reaction to treatment      Therapeutic Exercise  EDC stretches- 1 set x 8 reps, 20 second hold  PROM composite wrist and digit extension- 1 set x 8 reps, hold for 20s   AROM wrist flexion/extension- 2 reps  Doorway pec stretch with 90 degrees abduction- 2 reps,hold for 20s   AAROM Overhead triceps stretch- 2 reps, hold for 20s   AROM scapular retraction stretch- 2 reps, hold for 20s   Low trap \"Y\" stretch on wall - reps, hold for 20s     Manual Therapy   STM/CFM to dorsal FA up to lateral epicondyle   IASTM to dorsal FA/wrist extensors  K tape \"C\" - anchored proximal to MP of IF and pulled through webspace, around CMC joint  and anchord on dorsal palm    Skilled input: verbal instruction/cues    Writer verbally educated and received verbal consent for hand placement, positioning of patient, and techniques to be performed today from patient for clothing  Vik TOVAR, neurosurgery medical student, at bedside to assess patient. Writing RN verified that neurosurgery team does not want any labs or CT imaging done in [place].   adjustments for techniques, hand placement and palpation for techniques and therapist position for techniques as described above and how they are pertinent to the patient's plan of care.  Home Exercise Program  Access Code: [email or web address]  URL: [email or web address]  Date: 11/06/2024  Prepared by: Shy Carrillo    Exercises  - Seated Thumb Circumduction AROM  - 1 x daily - 7 x weekly - 2 sets - 10 reps  - Seated Thumb Circumduction AROM  - 1 x daily - 7 x weekly - 2 sets - 10 reps  - Seated Thumb Composite Flexion AROM  - 1 x daily - 7 x weekly - 2 sets - 10 reps  - Thumb Strengthening Stabilization CMC  - 1 x daily - 7 x weekly - 2 sets - 10 reps  - Ulnar Nerve Flossing  - 1 x daily - 7 x weekly - 2 sets - 10 reps  - Isometric Wrist Extension Pronated  - 2 x daily - 7 x weekly - 1 sets - 10 reps - 20 hold  - Seated Isometric Wrist Flexion Supinated with Manual Resistance  - 2 x daily - 7 x weekly - 1 sets - 10 reps - 20 hold  - Seated Isometric Elbow Flexion  - 2 x daily - 7 x weekly - 1 sets - 10 reps - 20 hold  - Seated Isometric Elbow Extension  - 2 x daily - 7 x weekly - 1 sets - 10 reps - 20 hold  - Doorway Pec Stretch at 90 Degrees Abduction  - 2 x daily - 7 x weekly - 2 sets - 10 reps - 20 hold  - Standing Overhead Triceps Stretch  - 2 x daily - 7 x weekly - 2 sets - 10 reps - 20 hold  - Seated Scapular Retraction  - 2 x daily - 7 x weekly - 2 sets - 10 reps - 20 hold  - Low Trap Setting at Wall  - 1 x daily - 7 x weekly - 2 sets - 10 reps - 20 hold     -Wrist flexion with FA pronated and elbow extended- 2 sets x 10 reps  -Gentle massage to lateral epicondyle            ASSESSMENT                                                                                                            Patient had decreased tightness today in dorsal FA. Hold on lateral epicondyle taping today due to skin irritation. Pain ratings remain unchanged, but patient reported that pain decreases later in the day  following therapy. Therapist gave scapular exercises to encourage proper body mechanics/posture with hopes of alleviating pain/strain felt at the elbow/FA. Patient demonstrated good understanding of exercises.   Pain/symptoms after session (out of 10): 3  Education:   - Results of above outlined education: Verbalizes understanding and Demonstrates understanding    PLAN                                                                                                                           Suggestions for next session as indicated: Progress per plan of care  US   A/AA/P ROM  STM   Strengthening    K-taping          Care approved by and performed under the direction of on-site therapist. Student’s note read and approved.  Lali Metcalf        Therapy procedure time and total treatment time can be found documented on the Time Entry flowsheet

## 2024-11-08 ENCOUNTER — ANESTHESIA EVENT (OUTPATIENT)
Dept: SURGERY | Facility: AMBULATORY SURGERY CENTER | Age: 69
End: 2024-11-08
Payer: COMMERCIAL

## 2024-11-08 RX ORDER — FENTANYL CITRATE 50 UG/ML
50 INJECTION, SOLUTION INTRAMUSCULAR; INTRAVENOUS EVERY 5 MIN PRN
Status: CANCELLED | OUTPATIENT
Start: 2024-11-08

## 2024-11-08 RX ORDER — OXYCODONE HYDROCHLORIDE 5 MG/1
10 TABLET ORAL
Status: CANCELLED | OUTPATIENT
Start: 2024-11-08

## 2024-11-08 RX ORDER — OXYCODONE HYDROCHLORIDE 5 MG/1
5 TABLET ORAL
Status: CANCELLED | OUTPATIENT
Start: 2024-11-08

## 2024-11-08 RX ORDER — ONDANSETRON 2 MG/ML
4 INJECTION INTRAMUSCULAR; INTRAVENOUS EVERY 30 MIN PRN
Status: CANCELLED | OUTPATIENT
Start: 2024-11-08

## 2024-11-08 RX ORDER — FENTANYL CITRATE 50 UG/ML
25 INJECTION, SOLUTION INTRAMUSCULAR; INTRAVENOUS EVERY 5 MIN PRN
Status: CANCELLED | OUTPATIENT
Start: 2024-11-08

## 2024-11-08 RX ORDER — DEXAMETHASONE SODIUM PHOSPHATE 4 MG/ML
4 INJECTION, SOLUTION INTRA-ARTICULAR; INTRALESIONAL; INTRAMUSCULAR; INTRAVENOUS; SOFT TISSUE
Status: CANCELLED | OUTPATIENT
Start: 2024-11-08

## 2024-11-08 RX ORDER — ONDANSETRON 4 MG/1
4 TABLET, ORALLY DISINTEGRATING ORAL EVERY 30 MIN PRN
Status: CANCELLED | OUTPATIENT
Start: 2024-11-08

## 2024-11-08 RX ORDER — NALOXONE HYDROCHLORIDE 0.4 MG/ML
0.1 INJECTION, SOLUTION INTRAMUSCULAR; INTRAVENOUS; SUBCUTANEOUS
Status: CANCELLED | OUTPATIENT
Start: 2024-11-08

## 2024-11-08 RX ORDER — SODIUM CHLORIDE, SODIUM LACTATE, POTASSIUM CHLORIDE, CALCIUM CHLORIDE 600; 310; 30; 20 MG/100ML; MG/100ML; MG/100ML; MG/100ML
INJECTION, SOLUTION INTRAVENOUS CONTINUOUS
Status: CANCELLED | OUTPATIENT
Start: 2024-11-08

## 2024-11-11 ENCOUNTER — ANESTHESIA (OUTPATIENT)
Dept: SURGERY | Facility: AMBULATORY SURGERY CENTER | Age: 69
End: 2024-11-11
Payer: COMMERCIAL

## 2024-11-11 ENCOUNTER — HOSPITAL ENCOUNTER (OUTPATIENT)
Facility: AMBULATORY SURGERY CENTER | Age: 69
Discharge: HOME | End: 2024-11-11
Attending: OPHTHALMOLOGY | Admitting: OPHTHALMOLOGY
Payer: COMMERCIAL

## 2024-11-11 VITALS
TEMPERATURE: 97 F | RESPIRATION RATE: 16 BRPM | WEIGHT: 160 LBS | SYSTOLIC BLOOD PRESSURE: 140 MMHG | HEART RATE: 53 BPM | OXYGEN SATURATION: 99 % | DIASTOLIC BLOOD PRESSURE: 51 MMHG | HEIGHT: 68 IN | BODY MASS INDEX: 24.25 KG/M2

## 2024-11-11 DIAGNOSIS — H02.403 INVOLUTIONAL PTOSIS, ACQUIRED, BILATERAL: Primary | ICD-10-CM

## 2024-11-11 PROCEDURE — 99100 ANES PT EXTEME AGE<1 YR&>70: CPT | Performed by: ANESTHESIOLOGY

## 2024-11-11 PROCEDURE — 67900 REPAIR BROW DEFECT: CPT | Performed by: ANESTHESIOLOGY

## 2024-11-11 PROCEDURE — 99100 ANES PT EXTEME AGE<1 YR&>70: CPT | Performed by: NURSE ANESTHETIST, CERTIFIED REGISTERED

## 2024-11-11 PROCEDURE — G8918 PT W/O PREOP ORDER IV AB PRO: HCPCS

## 2024-11-11 PROCEDURE — G8907 PT DOC NO EVENTS ON DISCHARG: HCPCS

## 2024-11-11 PROCEDURE — 67908 REPAIR EYELID DEFECT: CPT | Mod: E1

## 2024-11-11 PROCEDURE — 67900 REPAIR BROW DEFECT: CPT | Performed by: NURSE ANESTHETIST, CERTIFIED REGISTERED

## 2024-11-11 PROCEDURE — 67903 REPAIR EYELID DEFECT: CPT | Mod: 50 | Performed by: OPHTHALMOLOGY

## 2024-11-11 RX ORDER — NEOMYCIN POLYMYXIN B SULFATES AND DEXAMETHASONE 3.5; 10000; 1 MG/ML; [USP'U]/ML; MG/ML
SUSPENSION/ DROPS OPHTHALMIC
Qty: 5 ML | Refills: 0 | Status: SHIPPED | OUTPATIENT
Start: 2024-11-11

## 2024-11-11 RX ORDER — PROPOFOL 10 MG/ML
INJECTION, EMULSION INTRAVENOUS PRN
Status: DISCONTINUED | OUTPATIENT
Start: 2024-11-11 | End: 2024-11-11

## 2024-11-11 RX ORDER — POVIDONE-IODINE 5 %
SOLUTION, NON-ORAL OPHTHALMIC (EYE) PRN
Status: DISCONTINUED | OUTPATIENT
Start: 2024-11-11 | End: 2024-11-11 | Stop reason: HOSPADM

## 2024-11-11 RX ORDER — TETRACAINE HYDROCHLORIDE 5 MG/ML
SOLUTION OPHTHALMIC PRN
Status: DISCONTINUED | OUTPATIENT
Start: 2024-11-11 | End: 2024-11-11 | Stop reason: HOSPADM

## 2024-11-11 RX ORDER — FENTANYL CITRATE 50 UG/ML
INJECTION, SOLUTION INTRAMUSCULAR; INTRAVENOUS PRN
Status: DISCONTINUED | OUTPATIENT
Start: 2024-11-11 | End: 2024-11-11

## 2024-11-11 RX ORDER — ACETAMINOPHEN 325 MG/1
975 TABLET ORAL ONCE
Status: COMPLETED | OUTPATIENT
Start: 2024-11-11 | End: 2024-11-11

## 2024-11-11 RX ORDER — ERYTHROMYCIN 5 MG/G
OINTMENT OPHTHALMIC PRN
Status: DISCONTINUED | OUTPATIENT
Start: 2024-11-11 | End: 2024-11-11 | Stop reason: HOSPADM

## 2024-11-11 RX ORDER — LIDOCAINE 40 MG/G
CREAM TOPICAL
Status: DISCONTINUED | OUTPATIENT
Start: 2024-11-11 | End: 2024-11-12 | Stop reason: HOSPADM

## 2024-11-11 RX ORDER — SODIUM CHLORIDE, SODIUM LACTATE, POTASSIUM CHLORIDE, CALCIUM CHLORIDE 600; 310; 30; 20 MG/100ML; MG/100ML; MG/100ML; MG/100ML
INJECTION, SOLUTION INTRAVENOUS CONTINUOUS
Status: DISCONTINUED | OUTPATIENT
Start: 2024-11-11 | End: 2024-11-12 | Stop reason: HOSPADM

## 2024-11-11 RX ORDER — LIDOCAINE HYDROCHLORIDE 20 MG/ML
INJECTION, SOLUTION INFILTRATION; PERINEURAL PRN
Status: DISCONTINUED | OUTPATIENT
Start: 2024-11-11 | End: 2024-11-11

## 2024-11-11 RX ADMIN — ACETAMINOPHEN 975 MG: 325 TABLET ORAL at 07:36

## 2024-11-11 RX ADMIN — SODIUM CHLORIDE, SODIUM LACTATE, POTASSIUM CHLORIDE, CALCIUM CHLORIDE: 600; 310; 30; 20 INJECTION, SOLUTION INTRAVENOUS at 08:41

## 2024-11-11 RX ADMIN — FENTANYL CITRATE 25 MCG: 50 INJECTION, SOLUTION INTRAMUSCULAR; INTRAVENOUS at 08:46

## 2024-11-11 RX ADMIN — PROPOFOL 100 MG: 10 INJECTION, EMULSION INTRAVENOUS at 08:48

## 2024-11-11 RX ADMIN — LIDOCAINE HYDROCHLORIDE 60 MG: 20 INJECTION, SOLUTION INFILTRATION; PERINEURAL at 08:48

## 2024-11-11 NOTE — OP NOTE
PREOPERATIVE DIAGNOSIS: Bilateral upper eyelid ptosis.   POSTOPERATIVE DIAGNOSIS:  Bilateral upper eyelid ptosis.   PROCEDURE: Bilateral upper eyelid ptosis repair - posterior approach 8 mm right eye and 9 mm left eye advancement  SURGEON: Monika Bucio MD  ASSISTANT: None  ANESTHESIA: Monitored with local infiltration of a 50/50 mixture of 2% lidocaine with epinephrine and 0.5% Marcaine.   COMPLICATIONS: None.   ESTIMATED BLOOD LOSS: 1 mL   HISTORY: Neema Nixon presented with upper lid ptosis interfering with the superior visual field and activities of daily living. This is in the setting of prior meningioma resection and strabismus surgery. After the risks, benefits and alternatives to the proposed procedure were explained, informed consent was obtained.   PROCEDURE: The patient was brought to the operating room and placed supine on the operating table. IV sedation was given. The bilateral upper eyelid was infiltrated with local anesthetic and  was prepped and draped in the typical sterile ophthalmic fashion. Atttention was directed to the right  side.  A 4-0 silk suture was placed through the eyelid margin and the eyelid everted over a Desmarres retractor. A 6-0 silk suture was then threaded through the conjunctiva, levator aponeurosis and Briseno's muscle 4 mm from the superior tarsal border in order to excise a total of 8 mm of muscle and conjunctiva. These sutures were used to elevate the conjunctiva and Briseno's muscle. The Putterman clamp was used and clamped over the elevated tissues. A 6-0 plain gut suture was run in a horizontal mattress fashion 1 mm below the clamp from lateral to medial, then medial to lateral. The elevated tissues were excised with a 15 blade. The sutures were then externalized and tied in the lid crease, effectively advancing the Briseno's muscle, levator aponeurosis, and conjunctiva. The 4-0 silk suture was removed. The lid was reverted to its normal position and ophthalmic  antibiotic ointment placed in the eye.   Attention was directed to the left side where the same procedure was performed except the advancement was 9 mm. The patient tolerated the procedure well and left the operating room in stable condition.   Monika Bucio MD

## 2024-11-11 NOTE — ANESTHESIA POSTPROCEDURE EVALUATION
Patient: Neema Nixon    Procedure: Procedure(s):  Bilateral upper eyelid ptosis repair       Anesthesia Type:  MAC    Note:  Disposition: Outpatient   Postop Pain Control: Uneventful            Sign Out: Well controlled pain   PONV: No   Neuro/Psych: Uneventful            Sign Out: Acceptable/Baseline neuro status   Airway/Respiratory: Uneventful            Sign Out: Acceptable/Baseline resp. status   CV/Hemodynamics: Uneventful            Sign Out: Acceptable CV status; No obvious hypovolemia; No obvious fluid overload   Other NRE: NONE   DID A NON-ROUTINE EVENT OCCUR?            Last vitals:  Vitals Value Taken Time   /62 11/11/24 0935   Temp 96.4  F (35.8  C) 11/11/24 0917   Pulse 51 11/11/24 0935   Resp 16 11/11/24 0935   SpO2 98 % 11/11/24 0935       Electronically Signed By: Jonny Angeles MD  November 11, 2024  9:49 AM

## 2024-11-11 NOTE — ANESTHESIA PREPROCEDURE EVALUATION
Anesthesia Pre-Procedure Evaluation    Patient: Neema Nixon   MRN: [id number] : 1955        Procedure : Procedure(s):  Bilateral upper eyelid ptosis repair          Past Medical History:   Diagnosis Date    Arthritis     Left knee issue started a few months ago and arthritis in hands seversl yesrs ago    Double vision 10/23/2019    GERD (gastroesophageal reflux disease) 2010    Meningioma (H) 2020    PONV (postoperative nausea and vomiting)     Strabismus 2019    Vision loss of right eye 2020      Past Surgical History:   Procedure Laterality Date    COLONOSCOPY WITH CO2 INSUFFLATION N/A 2021    Procedure: COLONOSCOPY, WITH CO2 INSUFFLATION;  Surgeon: Sheri Summers DO;  Location: [facility]    COMBINED ESOPHAGOSCOPY, GASTROSCOPY, DUODENOSCOPY (EGD) WITH CO2 INSUFFLATION N/A 2022    Procedure: ESOPHAGOGASTRODUODENOSCOPY, WITH CO2 INSUFFLATION;  Surgeon: Mukesh Silva MD;  Location: [facility]    COMBINED ESOPHAGOSCOPY, GASTROSCOPY, DUODENOSCOPY (EGD) WITH CO2 INSUFFLATION N/A 2024    Procedure: Combined Esophagoscopy, Gastroscopy, Duodenoscopy (Egd) With Co2 Insufflation;  Surgeon: Sheri Summers DO;  Location: [facility]    ESOPHAGOSCOPY, GASTROSCOPY, DUODENOSCOPY (EGD), COMBINED N/A 2022    Procedure: ESOPHAGOGASTRODUODENOSCOPY, WITH BIOPSY;  Surgeon: Mukesh Silva MD;  Location: [facility]    ESOPHAGOSCOPY, GASTROSCOPY, DUODENOSCOPY (EGD), COMBINED N/A 2024    Procedure: ESOPHAGOGASTRODUODENOSCOPY, WITH BIOPSY;  Surgeon: Sheri Summers DO;  Location: [facility]    INSERT DRAIN LUMBAR N/A 2020    Procedure: INSERTION, DRAIN, SPINE, LUMBAR;  Surgeon: Deni De Paz MD;  Location: [facility] OR    LEG SURGERY Right     after fracture    OPTICAL TRACKING SYSTEM CRANIOTOMY, EXCISE TUMOR, COMBINED Right 2020    Procedure: stealth assisted Frontotemporal craniotomy for tumor with stereotactic image guidance;  Surgeon: Baldev  Deni Lynch MD;  Location: [facility] OR    RECESSION RESECTION WITH ADJUSTABLE SUTURE BILATERAL Bilateral 02/03/2020    Procedure: BILATERAL STRABISMUS REPAIR;  Surgeon: Mikhail Hernández MD;  Location:  OR    STRABISMUS SURGERY  02/2020    Went well      Allergies   Allergen Reactions    Sulfa Antibiotics Rash      Social History     Tobacco Use    Smoking status: Never     Passive exposure: Never    Smokeless tobacco: Never   Substance Use Topics    Alcohol use: Yes     Comment: Once a week - one cocktail      Wt Readings from Last 1 Encounters:   11/04/24 75.8 kg (167 lb)           Physical Exam    Airway        Mallampati: II   TM distance: > 3 FB   Neck ROM: full     Respiratory Devices and Support         Dental       (+) Completely normal teeth      Cardiovascular          Rhythm and rate: regular     Pulmonary           breath sounds clear to auscultation           OUTSIDE LABS:  CBC:   Lab Results   Component Value Date    WBC 10.7 08/22/2020    WBC 13.9 (H) 08/21/2020    HGB 9.5 (L) 08/22/2020    HGB 11.4 (L) 08/21/2020    HCT 30.2 (L) 08/22/2020    HCT 36.6 08/21/2020     08/22/2020     08/21/2020     BMP:   Lab Results   Component Value Date     08/22/2020     08/21/2020    POTASSIUM 3.7 08/22/2020    POTASSIUM 4.2 08/21/2020    CHLORIDE 110 (H) 08/22/2020    CHLORIDE 112 (H) 08/21/2020    CO2 29 08/22/2020    CO2 28 08/21/2020    BUN 12 08/22/2020    BUN 15 08/21/2020    CR 0.87 08/22/2020    CR 1.16 (H) 08/21/2020    GLC 95 04/28/2021     (H) 08/22/2020     COAGS:   Lab Results   Component Value Date    PTT 41 (H) 08/20/2020    INR 1.03 08/20/2020     POC:   Lab Results   Component Value Date    BGM 97 08/20/2020     HEPATIC:   Lab Results   Component Value Date    ALBUMIN 3.8 05/17/2022    PROTTOTAL 7.3 05/17/2022    ALT 35 05/17/2022    AST 20 05/17/2022    ALKPHOS 88 05/17/2022    BILITOTAL 0.6 05/17/2022     OTHER:   Lab Results   Component Value Date    GINO  "8.4 (L) 08/22/2020    PHOS 2.0 (L) 08/22/2020    MAG 1.9 08/22/2020    LIPASE 82 05/17/2022    TSH 2.03 06/16/2022       Anesthesia Plan    ASA Status:  1       Anesthesia Type: MAC.     - Reason for MAC: immobility needed              Consents    Anesthesia Plan(s) and associated risks, benefits, and realistic alternatives discussed. Questions answered and patient/representative(s) expressed understanding.     - Discussed:     - Discussed with:  Patient            Postoperative Care       PONV prophylaxis: Ondansetron (or other 5HT-3), Dexamethasone or Solumedrol     Comments:               Jonny Angeles MD    I have reviewed the pertinent notes and labs in the chart from the past 30 days and (re)examined the patient.  Any updates or changes from those notes are reflected in this note.                         # Overweight: Estimated body mass index is 25.96 kg/m  as calculated from the following:    Height as of 11/4/24: 1.708 m (5' 7.25\").    Weight as of 11/4/24: 75.8 kg (167 lb).             "

## 2024-11-11 NOTE — ANESTHESIA CARE TRANSFER NOTE
Patient: Neema Nixon    Procedure: Procedure(s):  Bilateral upper eyelid ptosis repair       Diagnosis: Involutional ptosis, acquired, bilateral [H02.403]  Diagnosis Additional Information: No value filed.    Anesthesia Type:   MAC     Note:    Oropharynx: oropharynx clear of all foreign objects  Level of Consciousness: awake  Oxygen Supplementation: room air    Independent Airway: airway patency satisfactory and stable  Dentition: dentition unchanged  Vital Signs Stable: post-procedure vital signs reviewed and stable  Report to RN Given: handoff report given  Patient transferred to: Phase II  Comments: To Phase II. Report to RN.  VSS Resp status stable.  Handoff Report: Identifed the Patient, Identified the Reponsible Provider, Reviewed the pertinent medical history, Discussed the surgical course, Reviewed Intra-OP anesthesia mangement and issues during anesthesia, Set expectations for post-procedure period and Allowed opportunity for questions and acknowledgement of understanding      Vitals:  Vitals Value Taken Time   BP     Temp     Pulse     Resp     SpO2         Electronically Signed By: MEKA Deleon CRNA  November 11, 2024  9:15 AM

## 2024-11-11 NOTE — DISCHARGE INSTRUCTIONS
Post-operative Instructions  Ophthalmic Plastic and Reconstructive Surgery    Monika Bucio M.D.     All instructions apply to the operated eye(s) or eyelid(s).    Wound care and personal care  ? Apply ice compresses and gentle pressure 15 minutes on, 15 minutes off, for 2 days. If you are sleeping, you don't need to wake up to ice. As long as there is no further bleeding, after two days, switch to warm water compresses for five minutes, four times a day until seen by your physician.   ? You may shower or wash your hair the day after surgery. Do not go swimming for at least 2 weeks to prevent contamination of your wounds.  ? You may go for walks and light activity is ok, but no heavy (over 15 pounds) lifting, bending or excessive straining for one week.   ? Do not apply make-up to the eyes or eyelids for 2 weeks after surgery.  ? Expect some swelling, bruising, black eye (even into the lower eyelids and cheeks). Also expect serum caking, crusting and discharge from the eye and/or incisions. A small amount of surface bleeding, and depending on the type of surgery, bleeding from the inside of the eyelid, is normal for the first 48 hours.  ? Avoid straining, bending at the waist, or lifting more than 15 pounds for 1 week. Sleeping with your head elevated, such as in a recliner, for the first several days can help swelling resolve more quickly.   ? Do continue to ambulate (walk) as you normally would - being sedentary after surgery can cause blood clots.   ? Your eye(s) and eyelid(s) may be painful and tender. This is normal after surgery.      Contact information and follow-up  ? Return to the Eye Clinic for a follow-up appointment with your physician as scheduled. If no appointment has been scheduled:   - [phone] for an appointment with Dr. Bucio within 2 to 3 weeks from your date of surgery.     ? For severe pain, bleeding, or loss of vision, call the [facility] at [phone].    After hours or on weekends and holidays, call [phone] and ask to speak with the ophthalmologist on call.    An on call person can be reached after hours for concerns. The on call doctor should not call in medication refill requests after hours or on weekends, so please plan accordingly. An effort has been made to provide adequate pain medications following every surgery, and refills will not be provided in most instances.     Medications  ? Restart all regular home medications and eye drops. If you take Plavix or Aspirin on a regular basis, wait for 72 hours after your surgery before restarting these in order to decrease the risk of bleeding complications.  ? Avoid aspirin and aspirin-like medications (Motrin, Aleve, Ibuprofen, Latonya-Walthall etc) for 72 hours to reduce the risk of bleeding. You may take Tylenol (acetaminophen) for pain.  ? In addition to your home medications, take the following post-operative medications as prescribed by your physician.    ? Instill prescribed eye drops 3 times a day for 10 days.   ? If you have ocular irritation, you can use over the counter artificial tears such as Refresh, Systane, or Blink. Do not use Visine, Clear Eyes, or any other drop that gets the red out.

## 2024-12-04 ENCOUNTER — OFFICE VISIT (OUTPATIENT)
Dept: OPHTHALMOLOGY | Facility: CLINIC | Age: 69
End: 2024-12-04
Payer: COMMERCIAL

## 2024-12-04 DIAGNOSIS — H02.403 INVOLUTIONAL PTOSIS, ACQUIRED, BILATERAL: Primary | ICD-10-CM

## 2024-12-04 ASSESSMENT — TONOMETRY
IOP_METHOD: ICARE
OS_IOP_MMHG: 12
OD_IOP_MMHG: 13

## 2024-12-04 ASSESSMENT — VISUAL ACUITY
METHOD: SNELLEN - LINEAR
CORRECTION_TYPE: GLASSES
OS_CC: 20/50
OD_CC: 20/50

## 2024-12-04 NOTE — NURSING NOTE
Chief Complaints and History of Present Illnesses   Patient presents with    Post Op (Ophthalmology) Both Eyes       Chief Complaint(s) and History of Present Illness(es)       Post Op (Ophthalmology) Both Eyes              Laterality: both eyes              Comments    S/p Bilateral upper eyelid ptosis repair - posterior approach 8 mm right eye and 9 mm left eye advancement 11/11/24. Doing well post op. Slight swelling remains and a few areas of bruising but no concerns about this as it continues to improve over time. Done with ointment and drops. Using warm compresses four times daily                   Kylie Roth COT

## 2024-12-04 NOTE — PROGRESS NOTES
"Chief Complaint(s) and History of Present Illness(es)     Post Op (Ophthalmology) Both Eyes            Laterality: both eyes          Comments    S/p Bilateral upper eyelid ptosis repair - posterior approach 8 mm right   eye and 9 mm left eye advancement 11/11/24. Doing well post op. Slight   swelling remains and a few areas of bruising but no concerns about this as   it continues to improve over time. Done with ointment and drops. Using   warm compresses four times daily               Doing well. Happy with outcome. MRD1 about 4 mm both eyes. No lagophthalmos. She is pleased. Follow-up as needed.     Patient Instructions   - Apply warm compresses for 1 minute two to three times a day until your bruising has resolved. You can continue this for one more month.   - If you have symptoms of eye irritation, it is good to use over the counter artificial tears. Good brands include Refresh, Blink, and Systane. Do NOT get drops that are for \"red eyes.\"   - It is normal for the incision to appear raised, red, itch and have small lumps. You can gently massage any small bumps along the incision line. These can take up to six months to resolve.    Return if symptoms worsen or fail to improve.      Attending Physician Attestation: Complete documentation of historical and exam elements from today's encounter can be found in the full encounter summary report (not reduplicated in this progress note). I personally obtained the chief complaint(s) and history of present illness. I confirmed and edited as necessary the review of systems, past medical/surgical history, family history, social history, and examination findings as documented by others; and I examined the patient myself. I personally reviewed the relevant tests, images, and reports as documented above. I formulated and edited as necessary the assessment and plan and discussed the findings and management plan with the patient and family. I personally reviewed the ophthalmic " test(s) associated with this encounter, agree with the interpretation(s) as documented by the resident/fellow, and have edited the corresponding report(s) as necessary. Monika Bucio MD

## 2024-12-04 NOTE — PATIENT INSTRUCTIONS
"- Apply warm compresses for 1 minute two to three times a day until your bruising has resolved. You can continue this for one more month.   - If you have symptoms of eye irritation, it is good to use over the counter artificial tears. Good brands include Refresh, Blink, and Systane. Do NOT get drops that are for \"red eyes.\"   - It is normal for the incision to appear raised, red, itch and have small lumps. You can gently massage any small bumps along the incision line. These can take up to six months to resolve.    "

## 2025-03-06 ENCOUNTER — OFFICE VISIT (OUTPATIENT)
Dept: FAMILY MEDICINE | Facility: CLINIC | Age: 70
End: 2025-03-06
Payer: COMMERCIAL

## 2025-03-06 VITALS
WEIGHT: 180.2 LBS | HEART RATE: 89 BPM | HEIGHT: 68 IN | TEMPERATURE: 97 F | DIASTOLIC BLOOD PRESSURE: 80 MMHG | BODY MASS INDEX: 27.31 KG/M2 | SYSTOLIC BLOOD PRESSURE: 130 MMHG | OXYGEN SATURATION: 100 % | RESPIRATION RATE: 16 BRPM

## 2025-03-06 DIAGNOSIS — D32.9 MENINGIOMA (H): ICD-10-CM

## 2025-03-06 DIAGNOSIS — H61.21 IMPACTED CERUMEN OF RIGHT EAR: Primary | ICD-10-CM

## 2025-03-06 ASSESSMENT — PAIN SCALES - GENERAL: PAINLEVEL_OUTOF10: NO PAIN (0)

## 2025-03-06 NOTE — PATIENT INSTRUCTIONS
Florecita Bethea,    Thank you for allowing [facility] to manage your care.    Based on our discussion, I have outlined the following instructions for you:    - Contact your neurosurgeon through [employer] or by phone to discuss the raised area at the site of your previous surgery.    Dr. De Paz can be reached at:  (w) [phone]    [facility] [id number]     (w) [phone]           If you develop worsening/changing symptoms at any time such as ear pain, changes in hearing, drainage from your ear or other worrisome symptoms, please be seen in clinic/urgent care.    If you have any questions or concerns, please feel free to call us at [phone]    Sincerely,    Jac Thompson PA-C    Did you know?      You can schedule a video visit for follow-up appointments as well as future appointments for certain conditions.  Please see the below link.     [email or web address]    If you have not already done so,  I encourage you to sign up for [facility] ([email or web address]).  This will allow you to review your results, securely communicate with a provider, and schedule virtual visits as well.

## 2025-03-06 NOTE — PROGRESS NOTES
"  Assessment & Plan   Problem List Items Addressed This Visit          Nervous and Auditory    Meningioma (H)     Other Visit Diagnoses       Impacted cerumen of right ear    -  Primary    Relevant Orders    MS REMOVAL IMPACTED CERUMEN IRRIGATION/LVG LARON (RN/MA)           Right sided cerumen impaction causing diminished hearing and ear symptoms. Symptoms resolved with irrigation using water solution followed by manual removal. Low suspicion for concurrent AOM, OE, mastoiditis or other worrisome issue. Discharge with instructions to use otc Debrox prn and follow up prn. Craniotomy site appears well healed, but she feels more of a deformity than previous and I passed on her neurosurgeon's (Baldev) contact info to discuss this with him.     Complete history and physical exam as below. Afebrile with normal vital signs.    DDx and Dx discussed with and explained to the pt to their satisfaction.  All questions were answered at this time. Pt expressed understanding of and agreement with this dx, tx, and plan. No further workup warranted and standard medication warnings given. I have given the patient a list of pertinent indications for re-evaluation. Will go to the Emergency Department if symptoms worsen or new concerning symptoms arise. Patient left in no apparent distress.      BMI  Estimated body mass index is 27.4 kg/m  as calculated from the following:    Height as of this encounter: 1.727 m (5' 8\").    Weight as of this encounter: 81.7 kg (180 lb 3.2 oz).     See Patient Instructions      Yasmin Bethea is a 69 year old, presenting for the following health issues:  Ear Problem        3/6/2025    10:47 AM   Additional Questions   Roomed by Taylor Curiel CMA   Accompanied by N/A         3/6/2025    10:47 AM   Patient Reported Additional Medications   Patient reports taking the following new medications No new medications     History of Present Illness       Reason for visit:  Ear pressure  Symptom onset:  " "1-2 weeks ago  Symptoms include:  Pain at night and pressure and hearing loss in right ear  Symptom intensity:  Severe  Symptom progression:  Staying the same  Had these symptoms before:  Yes  Has tried/received treatment for these symptoms:  Yes  Previous treatment was successful:  Yes  Prior treatment description:  Flushed out my ear to remove wax  What makes it worse:  Seems worse when lying in bed   She is taking medications regularly.   - Neema Nixon, 69-year-old female  - Right ear issues for over two weeks, with pressure and pain  - Previous ear washouts  - Occasional ringing in the ear, described as echoey  - Feels like ear is plugged  - Runny nose, mainly when going outside  - Use of allergy nasal spray regularly  - History of meningioma with a noticeable raised area at the surgical site, no pain but makes a weird noise when touched  There is no bleeding, discharge, redness or swelling noted.  Nothing occurring on the left.        Review of Systems  Constitutional, HEENT, cardiovascular, pulmonary, gi and gu systems are negative, except as otherwise noted.      Objective    /80   Pulse 89   Temp 97  F (36.1  C) (Temporal)   Resp 16   Ht 1.727 m (5' 8\")   Wt 81.7 kg (180 lb 3.2 oz)   LMP  (LMP Unknown)   SpO2 100%   BMI 27.40 kg/m    Body mass index is 27.4 kg/m .  Physical Exam  Vitals and nursing note reviewed.   Constitutional:       General: She is not in acute distress.     Appearance: Normal appearance. She is not diaphoretic.   HENT:      Head: Normocephalic.      Comments: Bony deformity at the right frontal craniotomy site. No tenderness, erythema, warmth, edema or other overlying signs of trauma or infection.     Right Ear: Tympanic membrane, ear canal and external ear normal. There is impacted cerumen.      Left Ear: Tympanic membrane, ear canal and external ear normal. There is no impacted cerumen.      Ears:      Comments: No mastoid or external ear tenderness.     Nose: Nose " normal.      Mouth/Throat:      Mouth: Mucous membranes are moist.      Pharynx: Oropharynx is clear.   Eyes:      Conjunctiva/sclera: Conjunctivae normal.   Pulmonary:      Effort: Pulmonary effort is normal. No respiratory distress.   Musculoskeletal:      Cervical back: Neck supple. No rigidity.   Lymphadenopathy:      Cervical: No cervical adenopathy.   Skin:     General: Skin is dry.      Coloration: Skin is not jaundiced or pale.   Neurological:      General: No focal deficit present.      Mental Status: She is alert. Mental status is at baseline.   Psychiatric:         Mood and Affect: Mood normal.         Behavior: Behavior normal.          PROCEDURE:    Cerumen Removal  INDICATIONS:    Cerumen impaction  PROCEDURE PROVIDER:    Jay  SITE:    right EAC  NOTE:    The EAC was cleared with a lighted ear currete and alligator forceps by me after warm tap water irrigation performed by the MA.  COMPLICATIONS:     None         Signed Electronically by: MARY May

## 2025-03-31 ENCOUNTER — OFFICE VISIT (OUTPATIENT)
Dept: FAMILY MEDICINE | Facility: CLINIC | Age: 70
End: 2025-03-31
Payer: COMMERCIAL

## 2025-03-31 VITALS
SYSTOLIC BLOOD PRESSURE: 122 MMHG | OXYGEN SATURATION: 99 % | DIASTOLIC BLOOD PRESSURE: 74 MMHG | WEIGHT: 178.8 LBS | HEART RATE: 83 BPM | RESPIRATION RATE: 16 BRPM | HEIGHT: 68 IN | TEMPERATURE: 97.8 F | BODY MASS INDEX: 27.1 KG/M2

## 2025-03-31 DIAGNOSIS — D32.9 MENINGIOMA (H): Primary | ICD-10-CM

## 2025-03-31 DIAGNOSIS — Z98.890 STATUS POST CRANIOTOMY: ICD-10-CM

## 2025-03-31 PROCEDURE — 3074F SYST BP LT 130 MM HG: CPT | Performed by: PHYSICIAN ASSISTANT

## 2025-03-31 PROCEDURE — 99212 OFFICE O/P EST SF 10 MIN: CPT | Performed by: PHYSICIAN ASSISTANT

## 2025-03-31 PROCEDURE — 1126F AMNT PAIN NOTED NONE PRSNT: CPT | Performed by: PHYSICIAN ASSISTANT

## 2025-03-31 PROCEDURE — 3078F DIAST BP <80 MM HG: CPT | Performed by: PHYSICIAN ASSISTANT

## 2025-03-31 ASSESSMENT — PAIN SCALES - GENERAL: PAINLEVEL_OUTOF10: NO PAIN (0)

## 2025-03-31 NOTE — PROGRESS NOTES
"  Assessment & Plan   Problem List Items Addressed This Visit          Nervous and Auditory    Meningioma (H) - Primary    Relevant Orders    Adult Neurosurgery  Referral     Other Visit Diagnoses       Status post craniotomy        Relevant Orders    Adult Neurosurgery  Referral           Assessment & Plan  Meningioma (H)  - Previous meningioma with status post craniotomy. No new symptoms such as headaches or vision changes reported aside from indentations that she has noted along the craniotomy site.  - Referral to neurosurgery for follow-up. Patient advised to contact neurosurgery to schedule an appointment. Provided with neurosurgery 's contact information ([phone]). If symptoms change, advised to seek medical attention sooner.    Status post craniotomy  - Status post craniotomy for meningioma resection in 2020. No current complications noted.  - Monitor for any changes in symptoms. Follow-up with neurosurgery as planned. Referral placed. Complete history and physical exam as below. Afebrile with normal vital signs.    All questions were answered at this time. Pt expressed understanding of and agreement with this dx, tx, and plan. I have given the patient a list of pertinent indications for re-evaluation. Will go to the Emergency Department if symptoms worsen or new concerning symptoms arise. Patient left in no apparent distress.      BMI  Estimated body mass index is 27.57 kg/m  as calculated from the following:    Height as of this encounter: 1.715 m (5' 7.52\").    Weight as of this encounter: 81.1 kg (178 lb 12.8 oz).     See Patient Instructions      Subjective   Neema is a 69 year old, presenting for the following health issues:  Referral        3/31/2025    10:46 AM   Additional Questions   Roomed by Taylor Curiel CMA   Accompanied by N/A         3/31/2025    10:46 AM   Patient Reported Additional Medications   Patient reports taking the following new medications No new " "medications     History of Present Illness       Reason for visit:  Need a referral for Dr De Paz - follow-up as I have developed some new concerns after craniotomy on 8/20.    She eats 2-3 servings of fruits and vegetables daily.She consumes 1 sweetened beverage(s) daily.She exercises with enough effort to increase her heart rate 20 to 29 minutes per day.  She exercises with enough effort to increase her heart rate 4 days per week.   She is taking medications regularly.   - Neema DICKEY Hussain, 69-year-old female.  - History of meningioma with tumor resection on the optic nerve of the right eye in 2020.  - Last MRI performed in October 2022.  - No recent headaches, vision changes, numbness, or tingling reported.  -no head injuries.  - Uncertainty about the presence of any new depression or skull deformity since the last visit.      Review of Systems  Constitutional, HEENT, msk, neuro systems are negative, except as otherwise noted.      Objective    /74   Pulse 83   Temp 97.8  F (36.6  C) (Temporal)   Resp 16   Ht 1.715 m (5' 7.52\")   Wt 81.1 kg (178 lb 12.8 oz)   LMP  (LMP Unknown)   SpO2 99%   BMI 27.57 kg/m    Body mass index is 27.57 kg/m .  Physical Exam  Vitals and nursing note reviewed.   Constitutional:       General: She is not in acute distress.     Appearance: Normal appearance. She is not diaphoretic.   HENT:      Head: Normocephalic and atraumatic.      Comments: No andrade sign, raccoon eyes or hemotympanum. Skull and facial bones non-tender. There are two <1cm non-tender indentations along the left temporal region.  No overlying signs of trauma or infection.     Nose: Nose normal.   Eyes:      Extraocular Movements: Extraocular movements intact.      Conjunctiva/sclera: Conjunctivae normal.      Pupils: Pupils are equal, round, and reactive to light.   Pulmonary:      Effort: Pulmonary effort is normal. No respiratory distress.   Skin:     General: Skin is dry.      Coloration: Skin is " not jaundiced or pale.   Neurological:      General: No focal deficit present.      Mental Status: She is alert. Mental status is at baseline.   Psychiatric:         Mood and Affect: Mood normal.         Behavior: Behavior normal.              Signed Electronically by: MARY May

## 2025-03-31 NOTE — PATIENT INSTRUCTIONS
Florecita Bethea,    Thank you for allowing [facility] to manage your care.    Based on our discussion, I have outlined the following instructions for you:    - Contact neurosurgery to schedule a follow-up appointment. You can reach them at [phone].  - Keep an eye on any changes in your symptoms, such as headaches or vision changes.  - If you notice any new or worsening symptoms, seek medical attention right away.    Thank you again for your visit, and we look forward to supporting you in your journey to better health.    Drink 8-10 glasses of fluid daily to stay well-hydrated.    If you have any questions or concerns, please feel free to call us at [phone]    Sincerely,    Jac Thompson PA-C    Did you know?      You can schedule a video visit for follow-up appointments as well as future appointments for certain conditions.  Please see the below link.     [email or web address]    If you have not already done so,  I encourage you to sign up for [employer] ([email or web address]).  This will allow you to review your results, securely communicate with a provider, and schedule virtual visits as well.

## 2025-04-22 ENCOUNTER — LAB (OUTPATIENT)
Dept: LAB | Facility: CLINIC | Age: 70
End: 2025-04-22
Payer: COMMERCIAL

## 2025-04-22 ENCOUNTER — E-VISIT (OUTPATIENT)
Dept: URGENT CARE | Facility: CLINIC | Age: 70
End: 2025-04-22
Payer: COMMERCIAL

## 2025-04-22 DIAGNOSIS — R30.0 DYSURIA: ICD-10-CM

## 2025-04-22 DIAGNOSIS — R30.0 DYSURIA: Primary | ICD-10-CM

## 2025-04-22 LAB
ALBUMIN UR-MCNC: NEGATIVE MG/DL
APPEARANCE UR: CLEAR
BILIRUB UR QL STRIP: NEGATIVE
COLOR UR AUTO: YELLOW
GLUCOSE UR STRIP-MCNC: NEGATIVE MG/DL
HGB UR QL STRIP: ABNORMAL
KETONES UR STRIP-MCNC: NEGATIVE MG/DL
LEUKOCYTE ESTERASE UR QL STRIP: NEGATIVE
NITRATE UR QL: NEGATIVE
PH UR STRIP: 5.5 [PH] (ref 5–7)
RBC #/AREA URNS AUTO: ABNORMAL /HPF
SP GR UR STRIP: 1.01 (ref 1–1.03)
SQUAMOUS #/AREA URNS AUTO: ABNORMAL /LPF
UROBILINOGEN UR STRIP-ACNC: 0.2 E.U./DL
WBC #/AREA URNS AUTO: ABNORMAL /HPF

## 2025-04-22 PROCEDURE — 81001 URINALYSIS AUTO W/SCOPE: CPT

## 2025-04-22 PROCEDURE — 99207 PR NON-BILLABLE SERV PER CHARTING: CPT | Performed by: EMERGENCY MEDICINE

## 2025-04-22 PROCEDURE — 87088 URINE BACTERIA CULTURE: CPT | Performed by: PHYSICIAN ASSISTANT

## 2025-04-22 PROCEDURE — 87086 URINE CULTURE/COLONY COUNT: CPT | Performed by: PHYSICIAN ASSISTANT

## 2025-04-22 NOTE — PATIENT INSTRUCTIONS
Dear Neema Nixon,     After reviewing your responses, I would like you to come in for a urine test to make sure we treat you correctly. This urine test is to evaluate you for a possible urinary tract infection, and should be scheduled for today or tomorrow. Schedule a Lab Only appointment here.     Lab appointments are not available at most locations on the weekends. If no Lab Only appointment is available, you should be seen in any of our convenient Walk-in or Urgent Care Centers, which can be found on our website here.     You will receive instructions with your results in [employer] once they are available.     If your symptoms worsen, you develop pain in your back or stomach, develop fevers, or are not improving in 5 days, please contact your primary care provider for an appointment or visit a Walk-in or Urgent Care Center to be seen.     Thanks again for choosing us as your health care partner,     Jeff Saunders MD

## 2025-04-24 LAB — BACTERIA UR CULT: ABNORMAL

## 2025-04-26 DIAGNOSIS — N39.0 ACUTE UTI (URINARY TRACT INFECTION): Primary | ICD-10-CM

## 2025-04-26 RX ORDER — CEFDINIR 300 MG/1
300 CAPSULE ORAL 2 TIMES DAILY
Qty: 14 CAPSULE | Refills: 0 | Status: SHIPPED | OUTPATIENT
Start: 2025-04-26 | End: 2025-05-03

## 2025-04-26 NOTE — PATIENT INSTRUCTIONS
Urinary Tract Infection (UTI) in Women: Care Instructions  Overview     A urinary tract infection (UTI) is an infection caused by bacteria. It can happen anywhere in the urinary tract. A UTI can happen in the:  Kidneys.  Ureters, the tubes that connect the kidneys to the bladder.  Bladder.  Urethra, where the urine comes out.  Most UTIs are bladder infections. They often cause pain or burning when you urinate.  Most UTIs can be cured with antibiotics. If you are prescribed antibiotics, be sure to complete your treatment so that the infection does not get worse.  Follow-up care is a key part of your treatment and safety. Be sure to make and go to all appointments, and call your doctor if you are having problems. It's also a good idea to know your test results and keep a list of the medicines you take.  How can you care for yourself at home?  Take your antibiotics as directed. Do not stop taking them just because you feel better. You need to take the full course of antibiotics.  Drink extra water and other fluids for the next day or two. This will help make the urine less concentrated and help wash out the bacteria that are causing the infection. (If you have kidney, heart, or liver disease and have to limit fluids, talk with your doctor before you increase the amount of fluids you drink.)  Avoid drinks that are carbonated or have caffeine. They can irritate the bladder.  Urinate often. Try to empty your bladder each time.  To relieve pain, take a hot bath or lay a heating pad set on low over your lower belly or genital area. Never go to sleep with a heating pad in place.  To prevent UTIs  Drink plenty of water each day. This helps you urinate often, which clears bacteria from your system. (If you have kidney, heart, or liver disease and have to limit fluids, talk with your doctor before you increase the amount of fluids you drink.)  Urinate when you need to.  If you are sexually active, urinate right after you have  "sex.  Change sanitary pads often.  Avoid douches, bubble baths, feminine hygiene sprays, and other feminine hygiene products that have deodorants.  After going to the bathroom, wipe from front to back.  When should you call for help?   Call your doctor now or seek immediate medical care if:    You have new or worse fever, chills, nausea, or vomiting.     You have new pain in your back just below your rib cage. This is called flank pain.     There is new blood or pus in your urine.     You have any problems with your antibiotic medicine.   Watch closely for changes in your health, and be sure to contact your doctor if:    You are not getting better after taking an antibiotic for 2 days.     Your symptoms go away but then come back.   Where can you learn more?  Go to https://www.[employer].net/patiented  Enter K848 in the search box to learn more about \"Urinary Tract Infection (UTI) in Women: Care Instructions.\"  Current as of: April 30, 2024  Content Version: 14.4    5794-5909 [employer].   Care instructions adapted under license by your healthcare professional. If you have questions about a medical condition or this instruction, always ask your healthcare professional. [employer] disclaims any warranty or liability for your use of this information.    "

## 2025-05-02 NOTE — PROGRESS NOTES
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  At that time, her exam was stable, but her ERM right eye had progressed.     Since then, she had bilateral upper eyelid ptosis repair on 11/11/24 with Dr. Bucio.      Her distance vision has been blurry in her habitual glasses.  Near vision stable.     Today's Testing:  OCT RNFL:  Right eye: Average RNFL 78 microns (previously 78 microns).  Stable thinning to previous.  Left eye: Average RNFL 83 microns (previously 83 microns).  Stable, borderline IT thinning.     Mac OCT: progressive ERM, right eye.  Stable lamellar hole/foveal contour disruption, left eye.     GTop:  Right eye: Reliable.  Mean deviation -2.10 dB.  Central defect, seen previously.  Left eye: Reliable.  Mean deviation -0.17 dB.  Full field.     Assessment and Plan:  Today, Neema has a stable, mild right optic neuropathy in the setting of right clinioid region meningioma s/p surgical resection.  Best-corrected visual acuity in her right eye is stable at 20/40 and left eye is slightly decreased at 20/40.  I will refer her to retina for evaluation of ERM, right eye and lamellar hole, left eye.    Follow-up with me in 1 year or sooner with any concerns/changes.    Repeat MRI scheduled for 5/16/25.    Complete documentation of historical and exam elements from today's encounter can be found in the full encounter summary report (not reduplicated in this progress note). I personally obtained the chief complaint(s) and history of present illness. I confirmed and edited as necessary the review of systems, past medical/surgical history, family history, social history, and examination findings as documented by others; and I examined the patient myself. I personally reviewed the relevant tests, images, and reports as documented above. I formulated and edited as necessary the assessment and plan and discussed the findings and  management plan with the patient and family.    Geri Jerez, OD

## 2025-05-05 ENCOUNTER — OFFICE VISIT (OUTPATIENT)
Dept: OPHTHALMOLOGY | Facility: CLINIC | Age: 70
End: 2025-05-05
Payer: COMMERCIAL

## 2025-05-05 DIAGNOSIS — H52.10 MYOPIA WITH REGULAR ASTIGMATISM AND PRESBYOPIA: ICD-10-CM

## 2025-05-05 DIAGNOSIS — H52.229 MYOPIA WITH REGULAR ASTIGMATISM AND PRESBYOPIA: ICD-10-CM

## 2025-05-05 DIAGNOSIS — H46.9 OPTIC NEUROPATHY: Primary | ICD-10-CM

## 2025-05-05 DIAGNOSIS — H52.4 MYOPIA WITH REGULAR ASTIGMATISM AND PRESBYOPIA: ICD-10-CM

## 2025-05-05 DIAGNOSIS — H53.453 OTHER LOCALIZED VISUAL FIELD DEFECT, BILATERAL: ICD-10-CM

## 2025-05-05 PROCEDURE — 92083 EXTENDED VISUAL FIELD XM: CPT

## 2025-05-05 PROCEDURE — G0463 HOSPITAL OUTPT CLINIC VISIT: HCPCS

## 2025-05-05 PROCEDURE — 92133 CPTRZD OPH DX IMG PST SGM ON: CPT

## 2025-05-05 ASSESSMENT — SLIT LAMP EXAM - LIDS
COMMENTS: DERMATOCHALASIS
COMMENTS: NORMAL

## 2025-05-05 ASSESSMENT — CONF VISUAL FIELD
OD_INFERIOR_NASAL_RESTRICTION: 0
OS_NORMAL: 1
OD_SUPERIOR_NASAL_RESTRICTION: 0
OD_NORMAL: 1
OD_INFERIOR_TEMPORAL_RESTRICTION: 0
OS_INFERIOR_TEMPORAL_RESTRICTION: 0
OD_SUPERIOR_TEMPORAL_RESTRICTION: 0
OS_SUPERIOR_NASAL_RESTRICTION: 0
OS_INFERIOR_NASAL_RESTRICTION: 0
METHOD: COUNTING FINGERS
OS_SUPERIOR_TEMPORAL_RESTRICTION: 0

## 2025-05-05 ASSESSMENT — REFRACTION_MANIFEST
OD_ADD: +3.00
OD_CYLINDER: +1.25
OS_AXIS: 030
OD_SPHERE: -11.25
OS_CYLINDER: +1.25
OS_SPHERE: -12.00
OS_ADD: +3.00
OD_AXIS: 175

## 2025-05-05 ASSESSMENT — REFRACTION_WEARINGRX
OS_ADD: +2.75
OD_SPHERE: -10.75
OD_ADD: +2.75
OS_AXIS: 030
OS_CYLINDER: +1.25
OD_CYLINDER: +1.25
OD_AXIS: 172
OS_SPHERE: -11.50

## 2025-05-05 ASSESSMENT — CUP TO DISC RATIO
OD_RATIO: 0.5
OS_RATIO: 0.5

## 2025-05-05 ASSESSMENT — EXTERNAL EXAM - LEFT EYE: OS_EXAM: NORMAL

## 2025-05-05 ASSESSMENT — VISUAL ACUITY
OS_CC+: +1
OS_CC: 20/60
CORRECTION_TYPE: GLASSES
METHOD: SNELLEN - LINEAR
OD_CC: 20/50

## 2025-05-05 ASSESSMENT — TONOMETRY
IOP_METHOD: ICARE
OS_IOP_MMHG: 13
OD_IOP_MMHG: 14

## 2025-05-05 ASSESSMENT — EXTERNAL EXAM - RIGHT EYE: OD_EXAM: NORMAL

## 2025-05-12 ENCOUNTER — PATIENT OUTREACH (OUTPATIENT)
Dept: CARE COORDINATION | Facility: CLINIC | Age: 70
End: 2025-05-12
Payer: COMMERCIAL

## 2025-05-16 ENCOUNTER — HOSPITAL ENCOUNTER (OUTPATIENT)
Dept: MRI IMAGING | Facility: CLINIC | Age: 70
Discharge: HOME | End: 2025-05-16
Attending: PHYSICIAN ASSISTANT | Admitting: PHYSICIAN ASSISTANT
Payer: COMMERCIAL

## 2025-05-16 DIAGNOSIS — D32.9 MENINGIOMA (H): ICD-10-CM

## 2025-05-16 PROCEDURE — 255N000002 HC RX 255 OP 636: Performed by: PHYSICIAN ASSISTANT

## 2025-05-16 PROCEDURE — 70553 MRI BRAIN STEM W/O & W/DYE: CPT

## 2025-05-16 PROCEDURE — A9585 GADOBUTROL INJECTION: HCPCS | Performed by: PHYSICIAN ASSISTANT

## 2025-05-16 RX ORDER — GADOBUTROL 604.72 MG/ML
8 INJECTION INTRAVENOUS ONCE
Status: COMPLETED | OUTPATIENT
Start: 2025-05-16 | End: 2025-05-16

## 2025-05-16 RX ADMIN — GADOBUTROL 8 ML: 604.72 INJECTION INTRAVENOUS at 09:59

## 2025-06-04 DIAGNOSIS — H35.371 EPIRETINAL MEMBRANE, RIGHT EYE: Primary | ICD-10-CM

## 2025-06-09 ENCOUNTER — PATIENT OUTREACH (OUTPATIENT)
Dept: CARE COORDINATION | Facility: CLINIC | Age: 70
End: 2025-06-09
Payer: COMMERCIAL

## 2025-06-17 ENCOUNTER — OFFICE VISIT (OUTPATIENT)
Dept: OPHTHALMOLOGY | Facility: CLINIC | Age: 70
End: 2025-06-17
Attending: OPHTHALMOLOGY
Payer: COMMERCIAL

## 2025-06-17 DIAGNOSIS — H35.371 EPIRETINAL MEMBRANE, RIGHT EYE: ICD-10-CM

## 2025-06-17 PROCEDURE — G0463 HOSPITAL OUTPT CLINIC VISIT: HCPCS | Performed by: OPHTHALMOLOGY

## 2025-06-17 PROCEDURE — 92250 FUNDUS PHOTOGRAPHY W/I&R: CPT | Performed by: OPHTHALMOLOGY

## 2025-06-17 PROCEDURE — 92134 CPTRZ OPH DX IMG PST SGM RTA: CPT | Performed by: OPHTHALMOLOGY

## 2025-06-17 ASSESSMENT — VISUAL ACUITY
OD_CC: 20/70-/+
CORRECTION_TYPE: GLASSES
METHOD: SNELLEN - LINEAR
OD_PH_CC: 20/40
OS_CC: 20/70-2/+
OS_PH_CC: 20/40

## 2025-06-17 ASSESSMENT — CUP TO DISC RATIO
OD_RATIO: 0.5
OS_RATIO: 0.5

## 2025-06-17 ASSESSMENT — EXTERNAL EXAM - LEFT EYE: OS_EXAM: NORMAL

## 2025-06-17 ASSESSMENT — TONOMETRY
OS_IOP_MMHG: 15
OD_IOP_MMHG: 13
IOP_METHOD: TONOPEN

## 2025-06-17 ASSESSMENT — REFRACTION_WEARINGRX
OD_AXIS: 172
OS_SPHERE: -11.50
OD_ADD: +2.75
OS_CYLINDER: +1.25
OD_SPHERE: -10.75
OS_ADD: +2.75
OS_AXIS: 030
OD_CYLINDER: +1.25

## 2025-06-17 ASSESSMENT — SLIT LAMP EXAM - LIDS
COMMENTS: NORMAL
COMMENTS: DERMATOCHALASIS

## 2025-06-17 ASSESSMENT — EXTERNAL EXAM - RIGHT EYE: OD_EXAM: NORMAL

## 2025-06-17 NOTE — PROGRESS NOTES
CC -   ERM    INTERVAL HISTORY - Initial visit with me, referred by Meri for ERM OD, notices distortion OD  since 2022 at least, not bothered      PMH -   Neema Nixon is a 70 year old patient with history of ERM OD referred by Meri  H/o meningioma causing mild optic neuropathy OD > left eye s/p resection 8/2020. Noted to have decrease vision in left eye to 20/40, with OCT macula revealing ERM right eye lamellar hole left eye. Patient notes some waviness to right eye vision after her meningioma resection         PAST OCULAR SURGERY  Strabismus repair for esotropia Dr. Hernández 3/2020  BULB 2024 ([facility])]      RETINAL IMAGING:  OCT   OD - mod ERM with foveal distortion and traction, possible lamellar hole, PVD, staphyloma  OS - mod ERM with lamellar hole      ASSESSMENT & PLAN    # ERM OD    - noted on OCT in 2020, no significant changes today   - VA OD decr to 20/40 after 2023 ?ERM, ?meningioma, ?catarct   - h/o optic neuropathy but stable per OCT scans, VA was 20/25 in 2022   - possible mild lamellar hole on OCT 5/2025     - mild distortion not bothered   - Monitor for now, if worsens consider PPV/MP      - given stability on OCT recheck 1 year        # ERM OS with lamellar hole   - VA was 20/25 in 2022, now 20/40 in 2025   - lamellar hole unclear benefit from surgery   - Monitor for now        # PVD OU   - advised s/sx rd 6/2025    # NS OU   - early VS    # Meningioma with optic neuropathy OD > OS   - followed by Meri   - stable      Return in about 1 year (around 6/17/2026) for DFE OU, OCT OU, [employer].     Jose C Orellana MD  PGY-3 Ophthalmology Resident  [facility]      ATTESTATION     Attending Attestation:     Complete documentation of historical and exam elements from today's encounter can be found in the full encounter summary report (not reduplicated in this progress note).  I personally obtained the chief complaint(s) and history of present illness.  I confirmed and  edited as necessary the review of systems, past medical/surgical history, family history, social history, and examination findings as documented by others; and I examined the patient myself.  I personally reviewed the relevant tests, images, and reports as documented above.  I personally reviewed the ophthalmic test(s) associated with this encounter, agree with the interpretation(s) as documented by the resident/fellow, and have edited the corresponding report(s) as necessary.   I formulated and edited as necessary the assessment and plan and discussed the findings and management plan with the patient and family    Perla Yepez MD, PhD  , Vitreoretinal Surgery  Department of Ophthalmology  [facility]

## 2025-06-19 ENCOUNTER — MYC MEDICAL ADVICE (OUTPATIENT)
Dept: FAMILY MEDICINE | Facility: CLINIC | Age: 70
End: 2025-06-19
Payer: COMMERCIAL

## 2025-06-25 ENCOUNTER — OFFICE VISIT (OUTPATIENT)
Dept: FAMILY MEDICINE | Facility: CLINIC | Age: 70
End: 2025-06-25
Payer: COMMERCIAL

## 2025-06-25 VITALS
SYSTOLIC BLOOD PRESSURE: 132 MMHG | HEIGHT: 68 IN | OXYGEN SATURATION: 99 % | HEART RATE: 84 BPM | TEMPERATURE: 97.4 F | WEIGHT: 181.8 LBS | BODY MASS INDEX: 27.55 KG/M2 | RESPIRATION RATE: 18 BRPM | DIASTOLIC BLOOD PRESSURE: 82 MMHG

## 2025-06-25 DIAGNOSIS — R35.0 URINARY FREQUENCY: Primary | ICD-10-CM

## 2025-06-25 DIAGNOSIS — R39.9 UTI SYMPTOMS: ICD-10-CM

## 2025-06-25 LAB
ALBUMIN UR-MCNC: NEGATIVE MG/DL
APPEARANCE UR: CLEAR
BILIRUB UR QL STRIP: NEGATIVE
COLOR UR AUTO: YELLOW
GLUCOSE UR STRIP-MCNC: NEGATIVE MG/DL
HGB UR QL STRIP: NEGATIVE
KETONES UR STRIP-MCNC: 15 MG/DL
LEUKOCYTE ESTERASE UR QL STRIP: NEGATIVE
NITRATE UR QL: NEGATIVE
PH UR STRIP: 5.5 [PH] (ref 5–7)
SP GR UR STRIP: >=1.03 (ref 1–1.03)
UROBILINOGEN UR STRIP-ACNC: 0.2 E.U./DL

## 2025-06-25 PROCEDURE — 99213 OFFICE O/P EST LOW 20 MIN: CPT | Performed by: PHYSICIAN ASSISTANT

## 2025-06-25 PROCEDURE — 81003 URINALYSIS AUTO W/O SCOPE: CPT | Performed by: PHYSICIAN ASSISTANT

## 2025-06-25 PROCEDURE — 3075F SYST BP GE 130 - 139MM HG: CPT | Performed by: PHYSICIAN ASSISTANT

## 2025-06-25 PROCEDURE — 3079F DIAST BP 80-89 MM HG: CPT | Performed by: PHYSICIAN ASSISTANT

## 2025-06-25 NOTE — PROGRESS NOTES
"  Assessment & Plan       ICD-10-CM    1. Urinary frequency  R35.0 UA Macroscopic with reflex to Microscopic and Culture - Lab Collect     UA Macroscopic with reflex to Microscopic and Culture - Lab Collect     Urine Culture Aerobic Bacterial - lab collect     Urine Culture Aerobic Bacterial - lab collect      2. UTI symptoms  R39.9           1,2) Her symptoms never fully resolved with Keflex. She filled a prescription for Cefdinir and will go ahead and start that for now. Culture ordered and in process. Follow up if symptoms fail to improve or worsen.       BMI  Estimated body mass index is 28.04 kg/m  as calculated from the following:    Height as of this encounter: 1.715 m (5' 7.52\").    Weight as of this encounter: 82.5 kg (181 lb 12.8 oz).       Return in about 1 week (around 7/2/2025), or if symptoms worsen or fail to improve.      Yasmin Bethea is a 70 year old, presenting for the following health issues:  UTI    History of Present Illness       Reason for visit:  Suspected UTI    She eats 2-3 servings of fruits and vegetables daily.She consumes 1 sweetened beverage(s) daily.She exercises with enough effort to increase her heart rate 20 to 29 minutes per day.    She is taking medications regularly.        Genitourinary - Female  Onset/Duration: 2 weeks ago  Description:   Painful urination (Dysuria): YES- burns           Frequency: YES  Blood in urine (Hematuria): No  Delay in urine (Hesitency): YES  Intensity: moderate  Progression of Symptoms:  worsening  Accompanying Signs & Symptoms:  Fever/chills: No  Flank pain: No but abdominal pain  Nausea and vomiting: No  Vaginal symptoms: none  Abdominal/Pelvic Pain: YES  History:   History of frequent UTI s: YES  History of kidney stones: No  Sexually Active: No  Possibility of pregnancy: No  Precipitating or alleviating factors: None  Therapies tried and outcome:  Antibiotic - Cefalexin 500 mg in April       UA was neg in April  Went to [place] and was " "notified then that her culture was positive for Strep  Was seen in [place] and put on Keflex  Finished this course, 7 days, but sxs never fully resolved    Denies fevers/chills, nausea/vomiting, upper back pain      Review of Systems  Constitutional, gi and gu systems are negative, except as otherwise noted.      Objective    /82   Pulse 84   Temp 97.4  F (36.3  C) (Temporal)   Resp 18   Ht 1.715 m (5' 7.52\")   Wt 82.5 kg (181 lb 12.8 oz)   LMP  (LMP Unknown)   SpO2 99%   BMI 28.04 kg/m    Body mass index is 28.04 kg/m .  Physical Exam   GENERAL: alert and no distress  MS: no gross musculoskeletal defects noted, no edema  SKIN: no suspicious lesions or rashes  NEURO: Normal strength and tone, mentation intact and speech normal  PSYCH: mentation appears normal, affect normal/bright    Recent Results (from the past 24 hours)   UA Macroscopic with reflex to Microscopic and Culture - Lab Collect    Specimen: Urine, Clean Catch   Result Value Ref Range    Color Urine Yellow Colorless, Straw, Light Yellow, Yellow    Appearance Urine Clear Clear    Glucose Urine Negative Negative mg/dL    Bilirubin Urine Negative Negative    Ketones Urine 15 (A) Negative mg/dL    Specific Gravity Urine >=1.030 1.003 - 1.035    Blood Urine Negative Negative    pH Urine 5.5 5.0 - 7.0    Protein Albumin Urine Negative Negative mg/dL    Urobilinogen Urine 0.2 0.2, 1.0 E.U./dL    Nitrite Urine Negative Negative    Leukocyte Esterase Urine Negative Negative    Narrative    Microscopic not indicated           Signed Electronically by: Laisha Dove PA-C    "

## 2025-06-27 ENCOUNTER — RESULTS FOLLOW-UP (OUTPATIENT)
Dept: FAMILY MEDICINE | Facility: CLINIC | Age: 70
End: 2025-06-27

## 2025-08-06 ENCOUNTER — ANCILLARY PROCEDURE (OUTPATIENT)
Dept: MAMMOGRAPHY | Facility: CLINIC | Age: 70
End: 2025-08-06
Attending: PHYSICIAN ASSISTANT
Payer: COMMERCIAL

## 2025-08-06 DIAGNOSIS — Z12.31 VISIT FOR SCREENING MAMMOGRAM: ICD-10-CM

## 2025-08-06 PROCEDURE — 77063 BREAST TOMOSYNTHESIS BI: CPT | Mod: TC | Performed by: RADIOLOGY

## 2025-08-06 PROCEDURE — 77067 SCR MAMMO BI INCL CAD: CPT | Mod: TC | Performed by: RADIOLOGY

## 2025-08-10 SDOH — HEALTH STABILITY: PHYSICAL HEALTH: ON AVERAGE, HOW MANY MINUTES DO YOU ENGAGE IN EXERCISE AT THIS LEVEL?: 50 MIN

## 2025-08-10 SDOH — HEALTH STABILITY: PHYSICAL HEALTH: ON AVERAGE, HOW MANY DAYS PER WEEK DO YOU ENGAGE IN MODERATE TO STRENUOUS EXERCISE (LIKE A BRISK WALK)?: 5 DAYS

## 2025-08-10 ASSESSMENT — SOCIAL DETERMINANTS OF HEALTH (SDOH): HOW OFTEN DO YOU GET TOGETHER WITH FRIENDS OR RELATIVES?: MORE THAN THREE TIMES A WEEK

## 2025-08-13 ENCOUNTER — OFFICE VISIT (OUTPATIENT)
Dept: FAMILY MEDICINE | Facility: CLINIC | Age: 70
End: 2025-08-13
Attending: PHYSICIAN ASSISTANT
Payer: COMMERCIAL

## 2025-08-13 VITALS
HEART RATE: 63 BPM | HEIGHT: 68 IN | OXYGEN SATURATION: 97 % | TEMPERATURE: 97 F | RESPIRATION RATE: 16 BRPM | SYSTOLIC BLOOD PRESSURE: 130 MMHG | WEIGHT: 184 LBS | BODY MASS INDEX: 27.89 KG/M2 | DIASTOLIC BLOOD PRESSURE: 82 MMHG

## 2025-08-13 DIAGNOSIS — M85.80 OSTEOPENIA, UNSPECIFIED LOCATION: ICD-10-CM

## 2025-08-13 DIAGNOSIS — L30.4 INTERTRIGO: ICD-10-CM

## 2025-08-13 DIAGNOSIS — K21.00 GASTROESOPHAGEAL REFLUX DISEASE WITH ESOPHAGITIS WITHOUT HEMORRHAGE: ICD-10-CM

## 2025-08-13 DIAGNOSIS — L84 CORN OR CALLUS: ICD-10-CM

## 2025-08-13 DIAGNOSIS — Z13.6 CARDIOVASCULAR SCREENING; LDL GOAL LESS THAN 160: ICD-10-CM

## 2025-08-13 DIAGNOSIS — Z00.00 ENCOUNTER FOR MEDICARE ANNUAL WELLNESS EXAM: Primary | ICD-10-CM

## 2025-08-13 DIAGNOSIS — Z13.1 SCREENING FOR DIABETES MELLITUS: ICD-10-CM

## 2025-08-13 LAB
ANION GAP SERPL CALCULATED.3IONS-SCNC: 9 MMOL/L (ref 7–15)
BUN SERPL-MCNC: 23.4 MG/DL (ref 8–23)
CALCIUM SERPL-MCNC: 10.2 MG/DL (ref 8.8–10.4)
CHLORIDE SERPL-SCNC: 104 MMOL/L (ref 98–107)
CHOLEST SERPL-MCNC: 175 MG/DL
CREAT SERPL-MCNC: 0.96 MG/DL (ref 0.51–0.95)
EGFRCR SERPLBLD CKD-EPI 2021: 63 ML/MIN/1.73M2
FASTING STATUS PATIENT QL REPORTED: YES
FASTING STATUS PATIENT QL REPORTED: YES
GLUCOSE SERPL-MCNC: 98 MG/DL (ref 70–99)
HCO3 SERPL-SCNC: 26 MMOL/L (ref 22–29)
HDLC SERPL-MCNC: 81 MG/DL
LDLC SERPL CALC-MCNC: 84 MG/DL
NONHDLC SERPL-MCNC: 94 MG/DL
POTASSIUM SERPL-SCNC: 4.9 MMOL/L (ref 3.4–5.3)
SODIUM SERPL-SCNC: 139 MMOL/L (ref 135–145)
TRIGL SERPL-MCNC: 52 MG/DL

## 2025-08-13 PROCEDURE — 36415 COLL VENOUS BLD VENIPUNCTURE: CPT | Performed by: PHYSICIAN ASSISTANT

## 2025-08-13 PROCEDURE — 80048 BASIC METABOLIC PNL TOTAL CA: CPT | Performed by: PHYSICIAN ASSISTANT

## 2025-08-13 PROCEDURE — 80061 LIPID PANEL: CPT | Performed by: PHYSICIAN ASSISTANT

## 2025-08-13 RX ORDER — NYSTATIN 100000 [USP'U]/G
POWDER TOPICAL DAILY PRN
Qty: 30 G | Refills: 1 | Status: SHIPPED | OUTPATIENT
Start: 2025-08-13

## 2025-08-14 ENCOUNTER — PATIENT OUTREACH (OUTPATIENT)
Dept: CARE COORDINATION | Facility: CLINIC | Age: 70
End: 2025-08-14
Payer: COMMERCIAL

## 2025-08-18 ENCOUNTER — PATIENT OUTREACH (OUTPATIENT)
Dept: CARE COORDINATION | Facility: CLINIC | Age: 70
End: 2025-08-18
Payer: COMMERCIAL

## (undated) DEVICE — ESU FCP CODMAN 8"X1.0MM SLIM TIP 9008100SL

## (undated) DEVICE — RX BACITRACIN OINTMENT 0.9G 1/32OZ CUR001109

## (undated) DEVICE — CUSA 36KHZ TIP CVD EXT MICROTIP CLARITY C74115

## (undated) DEVICE — SYR 30ML LL W/O NDL 302832

## (undated) DEVICE — SU VICRYL 6-0 S-14DA 18" UND J670G

## (undated) DEVICE — SHUNT BECKER EXTERNAL DRAIN 46128

## (undated) DEVICE — SOL WATER IRRIG 500ML BOTTLE 2F7113

## (undated) DEVICE — SPONGE COTTONOID 1X3" 20-10S

## (undated) DEVICE — SU VICRYL 2-0 CT-2 CR 8X18" J726D

## (undated) DEVICE — DRAPE SHEET REV FOLD 3/4 9349

## (undated) DEVICE — Device

## (undated) DEVICE — PACK MINOR EYE

## (undated) DEVICE — CUSA TUBE QUICK CONNECT CLARITY C7300

## (undated) DEVICE — PACK MINOR EYE CUSTOM ASC

## (undated) DEVICE — KIT ENDO FIRST STEP DISINFECTANT 200ML W/POUCH EP-4

## (undated) DEVICE — PACK CRANIOTOMY

## (undated) DEVICE — PREP POVIDONE IODINE SCRUB 7.5% 4OZ APL82212

## (undated) DEVICE — SOL WATER IRRIG 1000ML BOTTLE 2F7114

## (undated) DEVICE — PREP SKIN SCRUB TRAY 4461A

## (undated) DEVICE — SOL WATER IRRIG 1000ML BOTTLE 07139-09

## (undated) DEVICE — SU NUROLON 4-0 TF CR 8X18" C584D

## (undated) DEVICE — SOL NACL 0.9% 10ML VIAL 0409-4888-02

## (undated) DEVICE — GLOVE PROTEXIS MICRO 7.5  2D73PM75

## (undated) DEVICE — KIT ACCESSORY LUMBAR DRAIN 910121

## (undated) DEVICE — CATH TRAY FOLEY SURESTEP 16FR W/URNE MTR STLK LATEX A303316A

## (undated) DEVICE — GLOVE BIOGEL PI MICRO SZ 7.5 48575

## (undated) DEVICE — SPONGE SURGIFOAM 01GM POWDER 1978

## (undated) DEVICE — ESU GROUND PAD ADULT W/CORD E7507

## (undated) DEVICE — DRAPE IOBAN INCISE 13X13" 6640EZ

## (undated) DEVICE — SUCTION MANIFOLD DORNOCH ULTRA CART UL-CL500

## (undated) DEVICE — CUSA 36KHZ WRENCH TORQUE CLARITY C7602

## (undated) DEVICE — LINEN TOWEL PACK X5 5464

## (undated) DEVICE — PIN SKULL MAYFIELD ADULT TITANIUM 3/PK A1120

## (undated) DEVICE — ESU CORD BIPOLAR GREEN 10-4000

## (undated) DEVICE — PREP CHLORAPREP CLEAR 3ML 260400

## (undated) DEVICE — SLEEVE IRRIGATION MIS 13.0CM 5/PKG 5407-010-950

## (undated) DEVICE — LINEN TOWEL PACK X6 WHITE 5487

## (undated) DEVICE — SPONGE SURGIFOAM 100 1974

## (undated) DEVICE — PREP CHLORAPREP 26ML TINTED ORANGE  260815

## (undated) DEVICE — DRSG KERLIX 4 1/2"X4YDS ROLL 6715

## (undated) DEVICE — ESU ELEC BLADE 2.75" COATED/INSULATED E1455

## (undated) DEVICE — SU SILK 2-0 TIE 12X30" A305H

## (undated) DEVICE — EYE PREP BETADINE 5% SOLUTION 30ML 0065-0411-30

## (undated) DEVICE — SPONGE LAP 18X18" X8435

## (undated) DEVICE — SU SILK 6-0 S-14DA 18" 1780G

## (undated) DEVICE — SPONGE PATTIE NEURO DELICOT 10MMX13MM 63-03

## (undated) DEVICE — SYR 03ML LL W/O NDL

## (undated) DEVICE — SOL NACL 0.9% INJ 1000ML BAG 2B1324X

## (undated) DEVICE — BLADE KNIFE BEAVER MICROSHARP GREEN 377515

## (undated) DEVICE — WIPES FOLEY CARE SURESTEP PROVON DFC100

## (undated) DEVICE — PREP DURAPREP 26ML APL 8630

## (undated) DEVICE — MARKER SPHERES PASSIVE MEDT PACK 5 8801075

## (undated) DEVICE — GLOVE PROTEXIS BLUE W/NEU-THERA 7.5  2D73EB75

## (undated) DEVICE — SPECIMEN BAG MEDIVAC SUCTION WHITE SOCK 65652-122

## (undated) DEVICE — DRSG TEGADERM 4X4 3/4" 1626W

## (undated) DEVICE — TUBING STRYKER IRRIGATION CASSETTE 5400-050-001

## (undated) DEVICE — SPONGE RAY-TEC 4X8" 7318

## (undated) DEVICE — ESU CORD BIPOLAR AND IRR TUBING AESCULAP US355

## (undated) DEVICE — DECANTER VIAL 2006S

## (undated) DEVICE — SYR 10ML FINGER CONTROL W/O NDL 309695

## (undated) DEVICE — BUR STRK 2.3MM TAPERED ROUTER - FA2 5407-FA2-023

## (undated) DEVICE — PREP POVIDONE IODINE SOLUTION 10% 4OZ

## (undated) DEVICE — DRSG STERI STRIP 1/2X4" R1547

## (undated) DEVICE — SU VICRYL 6-0 S-29 12" J556G

## (undated) DEVICE — BLADE CLIPPER SGL USE 9680

## (undated) DEVICE — SOL RINGERS LACTATED 1000ML BAG 07953-09

## (undated) DEVICE — SU ETHILON 3-0 PS-1 18" 1663H

## (undated) DEVICE — DRAPE MAYO STAND 23X54 8337

## (undated) DEVICE — NDL 30GA 0.5" 305106

## (undated) DEVICE — DECANTER BAG 2002S

## (undated) DEVICE — DRAPE MICROSCOPE LEICA 54X150" AR8033650

## (undated) DEVICE — PAD CHUX UNDERPAD 23X24" 7136

## (undated) DEVICE — SU VICRYL 0 CT-1 CR 8X18" J740D

## (undated) DEVICE — DRAPE STERI TOWEL LG 1010

## (undated) DEVICE — SPONGE COTTONOID 1/2X3" 20-07S

## (undated) DEVICE — BUR STRK ROUND DIAMOND 2.0MM COARSE 8450-012-020DC

## (undated) DEVICE — CLIP RANEY

## (undated) DEVICE — EYE MARKING PAD 581057

## (undated) DEVICE — SU MONOCRYL 3-0 PS-1 27" Y936H

## (undated) DEVICE — BUR STRK 1.1MM STRAIGHT ROUTER 5820-070-011

## (undated) DEVICE — PERFORATOR 14MM CODMAN

## (undated) DEVICE — COVER CAMERA VIDEO LASER 9X96" 04-CC227

## (undated) DEVICE — NDL ANGIOCATH 14GA 1.25" 4048

## (undated) DEVICE — DRAPE WARMER 66X44" ORS-300

## (undated) DEVICE — SURGICEL HEMOSTAT 4X8" 1952

## (undated) DEVICE — DRAPE CRANIOTOMY W/POUCH 9450

## (undated) DEVICE — SPONGE COTTONOID 1/2X1 1/2" 20-06S

## (undated) DEVICE — PROBE DOPPLER MICRO MIZUHO

## (undated) DEVICE — SPONGE COTTONOID 1/2X1/2" 20-04S

## (undated) DEVICE — LINEN TOWEL PACK X30 5481

## (undated) DEVICE — DRAPE U SPLIT 74X120" 29440

## (undated) DEVICE — SU PLAIN 6-0 G-1DA 18" 770G

## (undated) RX ORDER — DEXAMETHASONE SODIUM PHOSPHATE 4 MG/ML
INJECTION, SOLUTION INTRA-ARTICULAR; INTRALESIONAL; INTRAMUSCULAR; INTRAVENOUS; SOFT TISSUE
Status: DISPENSED
Start: 2020-02-03

## (undated) RX ORDER — FENTANYL CITRATE 50 UG/ML
INJECTION, SOLUTION INTRAMUSCULAR; INTRAVENOUS
Status: DISPENSED
Start: 2020-02-03

## (undated) RX ORDER — HYDROMORPHONE HCL/0.9% NACL/PF 0.2MG/0.2
SYRINGE (ML) INTRAVENOUS
Status: DISPENSED
Start: 2020-08-20

## (undated) RX ORDER — EPHEDRINE SULFATE 50 MG/ML
INJECTION, SOLUTION INTRAMUSCULAR; INTRAVENOUS; SUBCUTANEOUS
Status: DISPENSED
Start: 2020-08-20

## (undated) RX ORDER — DEXAMETHASONE SODIUM PHOSPHATE 4 MG/ML
INJECTION, SOLUTION INTRA-ARTICULAR; INTRALESIONAL; INTRAMUSCULAR; INTRAVENOUS; SOFT TISSUE
Status: DISPENSED
Start: 2020-08-20

## (undated) RX ORDER — BALANCED SALT SOLUTION 6.4; .75; .48; .3; 3.9; 1.7 MG/ML; MG/ML; MG/ML; MG/ML; MG/ML; MG/ML
SOLUTION OPHTHALMIC
Status: DISPENSED
Start: 2020-02-03

## (undated) RX ORDER — PROPOFOL 10 MG/ML
INJECTION, EMULSION INTRAVENOUS
Status: DISPENSED
Start: 2020-02-03

## (undated) RX ORDER — MANNITOL 20 G/100ML
INJECTION, SOLUTION INTRAVENOUS
Status: DISPENSED
Start: 2020-08-20

## (undated) RX ORDER — FENTANYL CITRATE-0.9 % NACL/PF 10 MCG/ML
PLASTIC BAG, INJECTION (ML) INTRAVENOUS
Status: DISPENSED
Start: 2020-08-20

## (undated) RX ORDER — SIMETHICONE 40MG/0.6ML
SUSPENSION, DROPS(FINAL DOSAGE FORM)(ML) ORAL
Status: DISPENSED
Start: 2022-07-21

## (undated) RX ORDER — CEFAZOLIN SODIUM 2 G/100ML
INJECTION, SOLUTION INTRAVENOUS
Status: DISPENSED
Start: 2020-08-20

## (undated) RX ORDER — FENTANYL CITRATE 50 UG/ML
INJECTION, SOLUTION INTRAMUSCULAR; INTRAVENOUS
Status: DISPENSED
Start: 2024-05-30

## (undated) RX ORDER — PROPOFOL 10 MG/ML
INJECTION, EMULSION INTRAVENOUS
Status: DISPENSED
Start: 2020-08-20

## (undated) RX ORDER — GLYCOPYRROLATE 0.2 MG/ML
INJECTION, SOLUTION INTRAMUSCULAR; INTRAVENOUS
Status: DISPENSED
Start: 2020-08-20

## (undated) RX ORDER — CEFAZOLIN SODIUM 1 G/3ML
INJECTION, POWDER, FOR SOLUTION INTRAMUSCULAR; INTRAVENOUS
Status: DISPENSED
Start: 2020-08-20

## (undated) RX ORDER — GABAPENTIN 300 MG/1
CAPSULE ORAL
Status: DISPENSED
Start: 2020-02-03

## (undated) RX ORDER — LIDOCAINE HYDROCHLORIDE AND EPINEPHRINE 10; 10 MG/ML; UG/ML
INJECTION, SOLUTION INFILTRATION; PERINEURAL
Status: DISPENSED
Start: 2020-08-20

## (undated) RX ORDER — OXYCODONE HYDROCHLORIDE 5 MG/1
TABLET ORAL
Status: DISPENSED
Start: 2020-02-03

## (undated) RX ORDER — LIDOCAINE HYDROCHLORIDE 20 MG/ML
INJECTION, SOLUTION EPIDURAL; INFILTRATION; INTRACAUDAL; PERINEURAL
Status: DISPENSED
Start: 2020-08-20

## (undated) RX ORDER — FENTANYL CITRATE 50 UG/ML
INJECTION, SOLUTION INTRAMUSCULAR; INTRAVENOUS
Status: DISPENSED
Start: 2020-08-20

## (undated) RX ORDER — ALBUMIN, HUMAN INJ 5% 5 %
SOLUTION INTRAVENOUS
Status: DISPENSED
Start: 2020-08-20

## (undated) RX ORDER — KETOROLAC TROMETHAMINE 30 MG/ML
INJECTION, SOLUTION INTRAMUSCULAR; INTRAVENOUS
Status: DISPENSED
Start: 2020-02-03

## (undated) RX ORDER — SIMETHICONE 40MG/0.6ML
SUSPENSION, DROPS(FINAL DOSAGE FORM)(ML) ORAL
Status: DISPENSED
Start: 2021-07-27

## (undated) RX ORDER — FENTANYL CITRATE 50 UG/ML
INJECTION, SOLUTION INTRAMUSCULAR; INTRAVENOUS
Status: DISPENSED
Start: 2022-07-21

## (undated) RX ORDER — OXYMETAZOLINE HYDROCHLORIDE 0.05 G/100ML
SPRAY NASAL
Status: DISPENSED
Start: 2020-02-03

## (undated) RX ORDER — ONDANSETRON 2 MG/ML
INJECTION INTRAMUSCULAR; INTRAVENOUS
Status: DISPENSED
Start: 2024-05-30

## (undated) RX ORDER — FENTANYL CITRATE 50 UG/ML
INJECTION, SOLUTION INTRAMUSCULAR; INTRAVENOUS
Status: DISPENSED
Start: 2024-11-11

## (undated) RX ORDER — ONDANSETRON 2 MG/ML
INJECTION INTRAMUSCULAR; INTRAVENOUS
Status: DISPENSED
Start: 2020-08-20

## (undated) RX ORDER — ONDANSETRON 2 MG/ML
INJECTION INTRAMUSCULAR; INTRAVENOUS
Status: DISPENSED
Start: 2020-02-03

## (undated) RX ORDER — ESMOLOL HYDROCHLORIDE 10 MG/ML
INJECTION INTRAVENOUS
Status: DISPENSED
Start: 2020-08-20

## (undated) RX ORDER — LIDOCAINE HYDROCHLORIDE 10 MG/ML
INJECTION, SOLUTION EPIDURAL; INFILTRATION; INTRACAUDAL; PERINEURAL
Status: DISPENSED
Start: 2020-02-03

## (undated) RX ORDER — FENTANYL CITRATE 50 UG/ML
INJECTION, SOLUTION INTRAMUSCULAR; INTRAVENOUS
Status: DISPENSED
Start: 2021-07-27

## (undated) RX ORDER — LIDOCAINE HYDROCHLORIDE 20 MG/ML
INJECTION, SOLUTION EPIDURAL; INFILTRATION; INTRACAUDAL; PERINEURAL
Status: DISPENSED
Start: 2020-02-03

## (undated) RX ORDER — ACETAMINOPHEN 325 MG/1
TABLET ORAL
Status: DISPENSED
Start: 2020-02-03

## (undated) RX ORDER — PHENYLEPHRINE HCL IN 0.9% NACL 1 MG/10 ML
SYRINGE (ML) INTRAVENOUS
Status: DISPENSED
Start: 2020-02-03

## (undated) RX ORDER — SODIUM CHLORIDE 9 MG/ML
INJECTION, SOLUTION INTRAVENOUS
Status: DISPENSED
Start: 2020-08-20

## (undated) RX ORDER — ACETAMINOPHEN 325 MG/1
TABLET ORAL
Status: DISPENSED
Start: 2024-11-11